# Patient Record
Sex: FEMALE | Race: WHITE | Employment: FULL TIME | URBAN - METROPOLITAN AREA
[De-identification: names, ages, dates, MRNs, and addresses within clinical notes are randomized per-mention and may not be internally consistent; named-entity substitution may affect disease eponyms.]

---

## 2018-02-24 PROBLEM — E03.9 ACQUIRED HYPOTHYROIDISM: Status: ACTIVE | Noted: 2018-02-24

## 2018-02-24 PROBLEM — R09.A2 GLOBUS SENSATION: Status: ACTIVE | Noted: 2018-02-24

## 2018-02-24 PROBLEM — G47.33 OSA (OBSTRUCTIVE SLEEP APNEA): Status: ACTIVE | Noted: 2018-02-24

## 2018-02-24 PROBLEM — E11.9 TYPE 2 DIABETES MELLITUS WITHOUT COMPLICATION, WITHOUT LONG-TERM CURRENT USE OF INSULIN (HCC): Status: ACTIVE | Noted: 2018-02-24

## 2018-02-24 PROBLEM — J45.40 MODERATE PERSISTENT ASTHMA WITHOUT COMPLICATION: Status: ACTIVE | Noted: 2018-02-24

## 2018-02-24 PROBLEM — R09.89 GLOBUS SENSATION: Status: ACTIVE | Noted: 2018-02-24

## 2018-03-06 PROBLEM — R50.9 FEVER: Status: ACTIVE | Noted: 2018-03-06

## 2018-03-08 PROBLEM — E11.65 UNCONTROLLED TYPE 2 DIABETES MELLITUS WITH HYPERGLYCEMIA (HCC): Chronic | Status: ACTIVE | Noted: 2018-02-24

## 2018-03-08 PROBLEM — E78.5 DYSLIPIDEMIA: Chronic | Status: ACTIVE | Noted: 2018-03-08

## 2018-03-08 PROBLEM — E03.9 ACQUIRED HYPOTHYROIDISM: Chronic | Status: ACTIVE | Noted: 2018-02-24

## 2018-03-08 PROBLEM — E78.5 DYSLIPIDEMIA: Status: ACTIVE | Noted: 2018-03-08

## 2018-03-08 PROBLEM — E11.65 UNCONTROLLED TYPE 2 DIABETES MELLITUS WITH HYPERGLYCEMIA (HCC): Status: ACTIVE | Noted: 2018-02-24

## 2018-03-13 ENCOUNTER — OFFICE VISIT (OUTPATIENT)
Dept: FAMILY MEDICINE CLINIC | Age: 58
End: 2018-03-13
Payer: COMMERCIAL

## 2018-03-13 VITALS
DIASTOLIC BLOOD PRESSURE: 86 MMHG | HEIGHT: 64 IN | SYSTOLIC BLOOD PRESSURE: 156 MMHG | WEIGHT: 285 LBS | BODY MASS INDEX: 48.65 KG/M2 | HEART RATE: 60 BPM | OXYGEN SATURATION: 98 %

## 2018-03-13 DIAGNOSIS — E03.9 ACQUIRED HYPOTHYROIDISM: Chronic | ICD-10-CM

## 2018-03-13 DIAGNOSIS — Z23 NEED FOR INFLUENZA VACCINATION: ICD-10-CM

## 2018-03-13 DIAGNOSIS — E11.65 UNCONTROLLED TYPE 2 DIABETES MELLITUS WITH HYPERGLYCEMIA, UNSPECIFIED LONG TERM INSULIN USE STATUS: Primary | Chronic | ICD-10-CM

## 2018-03-13 DIAGNOSIS — R09.89 GLOBUS SENSATION: ICD-10-CM

## 2018-03-13 DIAGNOSIS — E78.5 DYSLIPIDEMIA: Chronic | ICD-10-CM

## 2018-03-13 PROCEDURE — 99213 OFFICE O/P EST LOW 20 MIN: CPT | Performed by: FAMILY MEDICINE

## 2018-03-13 PROCEDURE — 90471 IMMUNIZATION ADMIN: CPT | Performed by: FAMILY MEDICINE

## 2018-03-13 PROCEDURE — 90686 IIV4 VACC NO PRSV 0.5 ML IM: CPT | Performed by: FAMILY MEDICINE

## 2018-03-13 RX ORDER — GLUCOSAMINE HCL/CHONDROITIN SU 500-400 MG
CAPSULE ORAL
Qty: 100 STRIP | Refills: 1 | Status: SHIPPED | OUTPATIENT
Start: 2018-03-13 | End: 2018-07-23

## 2018-03-13 RX ORDER — LANCETS 30 GAUGE
EACH MISCELLANEOUS
Qty: 100 EACH | Refills: 3 | Status: SHIPPED | OUTPATIENT
Start: 2018-03-13 | End: 2018-07-23

## 2018-03-13 RX ORDER — ATORVASTATIN CALCIUM 20 MG/1
20 TABLET, FILM COATED ORAL NIGHTLY
Qty: 30 TABLET | Refills: 3 | Status: SHIPPED | OUTPATIENT
Start: 2018-03-13 | End: 2018-07-23

## 2018-03-13 RX ORDER — LEVOTHYROXINE SODIUM 0.07 MG/1
75 TABLET ORAL DAILY
Qty: 30 TABLET | Refills: 3 | Status: SHIPPED | OUTPATIENT
Start: 2018-03-13 | End: 2018-07-23

## 2018-03-13 ASSESSMENT — ENCOUNTER SYMPTOMS
ABDOMINAL PAIN: 0
TROUBLE SWALLOWING: 1
VOICE CHANGE: 0
DIARRHEA: 0
WHEEZING: 1
BLOOD IN STOOL: 0
SORE THROAT: 0
SHORTNESS OF BREATH: 0
COUGH: 0

## 2018-03-15 ENCOUNTER — HOSPITAL ENCOUNTER (OUTPATIENT)
Dept: MAMMOGRAPHY | Age: 58
Discharge: HOME | End: 2018-03-17
Payer: COMMERCIAL

## 2018-03-15 DIAGNOSIS — Z12.39 BREAST CANCER SCREENING: ICD-10-CM

## 2018-03-15 PROCEDURE — 77067 SCR MAMMO BI INCL CAD: CPT

## 2018-07-23 ENCOUNTER — APPOINTMENT (OUTPATIENT)
Dept: GENERAL RADIOLOGY | Age: 58
End: 2018-07-23
Payer: COMMERCIAL

## 2018-07-23 ENCOUNTER — HOSPITAL ENCOUNTER (EMERGENCY)
Age: 58
Discharge: HOME | End: 2018-07-23
Attending: EMERGENCY MEDICINE
Payer: COMMERCIAL

## 2018-07-23 VITALS
BODY MASS INDEX: 47.46 KG/M2 | DIASTOLIC BLOOD PRESSURE: 66 MMHG | RESPIRATION RATE: 16 BRPM | HEART RATE: 60 BPM | OXYGEN SATURATION: 97 % | TEMPERATURE: 98.4 F | SYSTOLIC BLOOD PRESSURE: 142 MMHG | WEIGHT: 278 LBS | HEIGHT: 64 IN

## 2018-07-23 DIAGNOSIS — M79.602 LEFT ARM PAIN: ICD-10-CM

## 2018-07-23 DIAGNOSIS — R07.89 ATYPICAL CHEST PAIN: Primary | ICD-10-CM

## 2018-07-23 LAB
ANION GAP SERPL CALCULATED.3IONS-SCNC: 12 MMOL/L (ref 7–16)
BASOPHILS ABSOLUTE: 0.05 E9/L (ref 0–0.2)
BASOPHILS RELATIVE PERCENT: 0.7 % (ref 0–2)
BUN BLDV-MCNC: 19 MG/DL (ref 6–20)
CALCIUM SERPL-MCNC: 9 MG/DL (ref 8.6–10.2)
CHLORIDE BLD-SCNC: 100 MMOL/L (ref 98–107)
CO2: 24 MMOL/L (ref 22–29)
CREAT SERPL-MCNC: 0.5 MG/DL (ref 0.5–1)
EKG ATRIAL RATE: 61 BPM
EKG P AXIS: 37 DEGREES
EKG P-R INTERVAL: 154 MS
EKG Q-T INTERVAL: 462 MS
EKG QRS DURATION: 124 MS
EKG QTC CALCULATION (BAZETT): 465 MS
EKG R AXIS: -17 DEGREES
EKG T AXIS: -10 DEGREES
EKG VENTRICULAR RATE: 61 BPM
EOSINOPHILS ABSOLUTE: 0.27 E9/L (ref 0.05–0.5)
EOSINOPHILS RELATIVE PERCENT: 3.9 % (ref 0–6)
GFR AFRICAN AMERICAN: >60
GFR NON-AFRICAN AMERICAN: >60 ML/MIN/1.73
GLUCOSE BLD-MCNC: 305 MG/DL (ref 74–109)
HCT VFR BLD CALC: 38.5 % (ref 34–48)
HEMOGLOBIN: 13.1 G/DL (ref 11.5–15.5)
IMMATURE GRANULOCYTES #: 0.01 E9/L
IMMATURE GRANULOCYTES %: 0.1 % (ref 0–5)
LYMPHOCYTES ABSOLUTE: 2.68 E9/L (ref 1.5–4)
LYMPHOCYTES RELATIVE PERCENT: 38.3 % (ref 20–42)
MCH RBC QN AUTO: 29.4 PG (ref 26–35)
MCHC RBC AUTO-ENTMCNC: 34 % (ref 32–34.5)
MCV RBC AUTO: 86.5 FL (ref 80–99.9)
MONOCYTES ABSOLUTE: 0.48 E9/L (ref 0.1–0.95)
MONOCYTES RELATIVE PERCENT: 6.9 % (ref 2–12)
NEUTROPHILS ABSOLUTE: 3.51 E9/L (ref 1.8–7.3)
NEUTROPHILS RELATIVE PERCENT: 50.1 % (ref 43–80)
PDW BLD-RTO: 12 FL (ref 11.5–15)
PLATELET # BLD: 259 E9/L (ref 130–450)
PMV BLD AUTO: 9.6 FL (ref 7–12)
POTASSIUM SERPL-SCNC: 4.6 MMOL/L (ref 3.5–5)
RBC # BLD: 4.45 E12/L (ref 3.5–5.5)
SODIUM BLD-SCNC: 136 MMOL/L (ref 132–146)
TROPONIN: <0.01 NG/ML (ref 0–0.03)
WBC # BLD: 7 E9/L (ref 4.5–11.5)

## 2018-07-23 PROCEDURE — 6370000000 HC RX 637 (ALT 250 FOR IP): Performed by: EMERGENCY MEDICINE

## 2018-07-23 PROCEDURE — 85025 COMPLETE CBC W/AUTO DIFF WBC: CPT

## 2018-07-23 PROCEDURE — 80048 BASIC METABOLIC PNL TOTAL CA: CPT

## 2018-07-23 PROCEDURE — 93005 ELECTROCARDIOGRAM TRACING: CPT | Performed by: EMERGENCY MEDICINE

## 2018-07-23 PROCEDURE — 71045 X-RAY EXAM CHEST 1 VIEW: CPT

## 2018-07-23 PROCEDURE — 84484 ASSAY OF TROPONIN QUANT: CPT

## 2018-07-23 PROCEDURE — 99285 EMERGENCY DEPT VISIT HI MDM: CPT

## 2018-07-23 PROCEDURE — 36415 COLL VENOUS BLD VENIPUNCTURE: CPT

## 2018-07-23 RX ORDER — NAPROXEN 500 MG/1
500 TABLET ORAL 2 TIMES DAILY WITH MEALS
Qty: 30 TABLET | Refills: 0 | Status: SHIPPED | OUTPATIENT
Start: 2018-07-23 | End: 2019-03-27

## 2018-07-23 RX ORDER — ASPIRIN 81 MG/1
324 TABLET, CHEWABLE ORAL ONCE
Status: COMPLETED | OUTPATIENT
Start: 2018-07-23 | End: 2018-07-23

## 2018-07-23 RX ADMIN — ASPIRIN 81 MG 324 MG: 81 TABLET ORAL at 20:57

## 2018-07-23 ASSESSMENT — ENCOUNTER SYMPTOMS
NAUSEA: 0
SINUS PAIN: 0
WHEEZING: 0
CONSTIPATION: 0
RHINORRHEA: 0
ABDOMINAL PAIN: 0
SORE THROAT: 0
SHORTNESS OF BREATH: 1
COUGH: 0
PHOTOPHOBIA: 0
SINUS PRESSURE: 0
VOMITING: 0
DIARRHEA: 0
BACK PAIN: 0

## 2018-07-23 ASSESSMENT — PAIN SCALES - GENERAL: PAINLEVEL_OUTOF10: 6

## 2018-07-24 NOTE — ED PROVIDER NOTES
Patient is a 81-year-old female who presents the chief complaint of arm pain and chest pain. Patient states that for the past 2 days she has been experiencing left arm pain, describes as being an aching sensation. She states that it is intermittent and will happen for approximately 5 minutes and then it will go away. Nothing makes it better or worse. She also admits to substernal chest tightness, nothing makes it better or worse. She admits to shortness of breath. Denies any associated lightheadedness, dizziness, abdominal pain, nausea, vomiting. No known trauma. Patient denies any history of MI. No recent stress test. Positive family history of father, MI at age of 79. Patient is a history of diabetes. No treatment prior to arrival.      The history is provided by the patient. No  was used. Review of Systems   Constitutional: Negative for chills, fatigue and fever. HENT: Negative for congestion, rhinorrhea, sinus pain, sinus pressure, sneezing and sore throat. Eyes: Negative for photophobia and visual disturbance. Respiratory: Positive for shortness of breath. Negative for cough and wheezing. Cardiovascular: Positive for chest pain. Negative for palpitations. Gastrointestinal: Negative for abdominal pain, constipation, diarrhea, nausea and vomiting. Genitourinary: Negative for dysuria, frequency and hematuria. Musculoskeletal: Positive for myalgias. Negative for arthralgias and back pain. Skin: Negative for rash and wound. Neurological: Negative for dizziness, light-headedness and headaches. Psychiatric/Behavioral: Negative for agitation, behavioral problems and confusion. Physical Exam   Constitutional: She is oriented to person, place, and time. She appears well-developed and well-nourished. No distress. HENT:   Head: Normocephalic and atraumatic. Mouth/Throat: Oropharynx is clear and moist. No oropharyngeal exudate.    Eyes: Conjunctivae are normal. down into her left elbow. Intermittent and cramping for the last couple of days. No associated chest pain. She does comment on having chest pain which she has had for a long time which has been unchanged over the last 2 days. Also comments on some mild \"heavy breathing\" which she states again has been a chronic and ongoing issue with asthma and is not changed in the last few days. It only changing symptom in the last several days has been this intermittent left arm pain. She does a lot of lifting, pushing and pulling at work and is able to find some areas where she can feel the muscle start to pull during the exam..  My findings/plan: In the bed at this time. Denying any chest pain or shortness of breath at this time. Heart rate regular, lungs are clear and equal. Abdomen soft and nontender. All for extremities are neurovascular intact. No pretibial edema or calf pain. Left arm with no tenderness or swelling, no joint effusions or signs of acute injury. Left arm is neurovascular intact. With range of motion she is able to reproduce some of the symptoms, gets some pulling sensation to the posterior arm and shoulder blade region with range of motion. [NC]      ED Course User Index  [NC] Carolann Curran, DO       --------------------------------------------- PAST HISTORY ---------------------------------------------  Past Medical History:  has a past medical history of Asthma; Bronchitis; Diabetes mellitus ([address] Psoriasis; Thyroid disease; and Water retention. Past Surgical History:  has a past surgical history that includes Hysterectomy and Cholecystectomy. Social History:  reports that she has quit smoking. She has never used smokeless tobacco. She reports that she does not drink alcohol or use drugs. Family History: family history includes Breast Cancer in her mother; Colon Cancer (age of onset: 80) in her mother. The patients home medications have been reviewed.     Allergies: Matin

## 2018-07-26 ENCOUNTER — OFFICE VISIT (OUTPATIENT)
Dept: FAMILY MEDICINE CLINIC | Age: 58
End: 2018-07-26
Payer: COMMERCIAL

## 2018-07-26 ENCOUNTER — HOSPITAL ENCOUNTER (OUTPATIENT)
Age: 58
Discharge: HOME | End: 2018-07-28
Payer: COMMERCIAL

## 2018-07-26 VITALS
BODY MASS INDEX: 48.32 KG/M2 | WEIGHT: 283 LBS | HEART RATE: 63 BPM | DIASTOLIC BLOOD PRESSURE: 80 MMHG | OXYGEN SATURATION: 98 % | SYSTOLIC BLOOD PRESSURE: 144 MMHG | HEIGHT: 64 IN

## 2018-07-26 DIAGNOSIS — E11.65 UNCONTROLLED TYPE 2 DIABETES MELLITUS WITH HYPERGLYCEMIA, UNSPECIFIED LONG TERM INSULIN USE STATUS: Chronic | ICD-10-CM

## 2018-07-26 DIAGNOSIS — E03.9 ACQUIRED HYPOTHYROIDISM: Chronic | ICD-10-CM

## 2018-07-26 DIAGNOSIS — Z09 HOSPITAL DISCHARGE FOLLOW-UP: Primary | ICD-10-CM

## 2018-07-26 DIAGNOSIS — E78.5 DYSLIPIDEMIA: Chronic | ICD-10-CM

## 2018-07-26 DIAGNOSIS — Z91.199 NONCOMPLIANCE: ICD-10-CM

## 2018-07-26 LAB
ALBUMIN SERPL-MCNC: 4 G/DL (ref 3.5–5.2)
ALP BLD-CCNC: 114 U/L (ref 35–104)
ALT SERPL-CCNC: 23 U/L (ref 0–32)
ANION GAP SERPL CALCULATED.3IONS-SCNC: 14 MMOL/L (ref 7–16)
AST SERPL-CCNC: 28 U/L (ref 0–31)
BASOPHILS ABSOLUTE: 0.05 E9/L (ref 0–0.2)
BASOPHILS RELATIVE PERCENT: 0.7 % (ref 0–2)
BILIRUB SERPL-MCNC: 0.3 MG/DL (ref 0–1.2)
BUN BLDV-MCNC: 17 MG/DL (ref 6–20)
CALCIUM SERPL-MCNC: 9.3 MG/DL (ref 8.6–10.2)
CHLORIDE BLD-SCNC: 95 MMOL/L (ref 98–107)
CHOLESTEROL, TOTAL: 224 MG/DL (ref 0–199)
CO2: 26 MMOL/L (ref 22–29)
CREAT SERPL-MCNC: 0.5 MG/DL (ref 0.5–1)
EOSINOPHILS ABSOLUTE: 0.29 E9/L (ref 0.05–0.5)
EOSINOPHILS RELATIVE PERCENT: 4.2 % (ref 0–6)
GFR AFRICAN AMERICAN: >60
GFR NON-AFRICAN AMERICAN: >60 ML/MIN/1.73
GLUCOSE BLD-MCNC: 354 MG/DL (ref 74–109)
HBA1C MFR BLD: 12.7 % (ref 4–5.6)
HCT VFR BLD CALC: 42.6 % (ref 34–48)
HDLC SERPL-MCNC: 51 MG/DL
HEMOGLOBIN: 13.6 G/DL (ref 11.5–15.5)
IMMATURE GRANULOCYTES #: 0.01 E9/L
IMMATURE GRANULOCYTES %: 0.1 % (ref 0–5)
LDL CHOLESTEROL CALCULATED: 145 MG/DL (ref 0–99)
LYMPHOCYTES ABSOLUTE: 2.24 E9/L (ref 1.5–4)
LYMPHOCYTES RELATIVE PERCENT: 32.3 % (ref 20–42)
MCH RBC QN AUTO: 28.6 PG (ref 26–35)
MCHC RBC AUTO-ENTMCNC: 31.9 % (ref 32–34.5)
MCV RBC AUTO: 89.7 FL (ref 80–99.9)
MONOCYTES ABSOLUTE: 0.52 E9/L (ref 0.1–0.95)
MONOCYTES RELATIVE PERCENT: 7.5 % (ref 2–12)
NEUTROPHILS ABSOLUTE: 3.83 E9/L (ref 1.8–7.3)
NEUTROPHILS RELATIVE PERCENT: 55.2 % (ref 43–80)
PDW BLD-RTO: 12.1 FL (ref 11.5–15)
PLATELET # BLD: 266 E9/L (ref 130–450)
PMV BLD AUTO: 9.4 FL (ref 7–12)
POTASSIUM SERPL-SCNC: 4.6 MMOL/L (ref 3.5–5)
RBC # BLD: 4.75 E12/L (ref 3.5–5.5)
SODIUM BLD-SCNC: 135 MMOL/L (ref 132–146)
T4 FREE: 0.8 NG/DL (ref 0.93–1.7)
TOTAL PROTEIN: 7.4 G/DL (ref 6.4–8.3)
TRIGL SERPL-MCNC: 138 MG/DL (ref 0–149)
TSH SERPL DL<=0.05 MIU/L-ACNC: 45.26 UIU/ML (ref 0.27–4.2)
VLDLC SERPL CALC-MCNC: 28 MG/DL
WBC # BLD: 6.9 E9/L (ref 4.5–11.5)

## 2018-07-26 PROCEDURE — 83036 HEMOGLOBIN GLYCOSYLATED A1C: CPT

## 2018-07-26 PROCEDURE — 80053 COMPREHEN METABOLIC PANEL: CPT

## 2018-07-26 PROCEDURE — 85025 COMPLETE CBC W/AUTO DIFF WBC: CPT

## 2018-07-26 PROCEDURE — 36415 COLL VENOUS BLD VENIPUNCTURE: CPT

## 2018-07-26 PROCEDURE — 84439 ASSAY OF FREE THYROXINE: CPT

## 2018-07-26 PROCEDURE — 99213 OFFICE O/P EST LOW 20 MIN: CPT | Performed by: FAMILY MEDICINE

## 2018-07-26 PROCEDURE — 84443 ASSAY THYROID STIM HORMONE: CPT

## 2018-07-26 PROCEDURE — 80061 LIPID PANEL: CPT

## 2018-07-26 RX ORDER — IBUPROFEN 200 MG
200 TABLET ORAL EVERY 6 HOURS PRN
COMMUNITY
End: 2018-08-02

## 2018-07-26 ASSESSMENT — ENCOUNTER SYMPTOMS
ABDOMINAL PAIN: 0
CHEST TIGHTNESS: 1
SHORTNESS OF BREATH: 0

## 2018-07-26 NOTE — PROGRESS NOTES
Subjective:      Patient ID: Taylor Ramos is a 65 y.o. female. Chief Complaint   Patient presents with    Follow-Up from Hospital     pt seen at hospital for Left Upper Arm pain; pt states pain radiates to shoulder and down to elbow     HPI    Patient was last seen 3/2018 and did not follow up as scheduled. At that time she was diagnosed with DM, hypothyroidism, HLD, and prescribed relevant medications last visit 3/2018. Did not begin any of those medications. Claims she will begin them. States she will \"try to start them this week\". ED follow-up: patient was seen at [address] [place] [address] 300 ED 7/23/18 and diagnosed with atypical chest pain, left arm pain, and was prescribed naproxen on discharge. States she did not begin naproxen and that she is waiting until payday to pick it up. Reports pain has improved but does continue. Pain is located at the posterior left upper arm, goes into the trapezius and down to the elbow. Sharp, stabbing in character. Intermittent. Intensity is currently moderate, was severe at the time of the ED visit. Exacerbated by lifting freight at work, palpation. States it is not related to physical exertion other than with her upper body; is not exacerbated by walking, climbing stairs. Remits with over-the-counter Advil. Admits to occasional tightness in the chest, not related to exertion. No coinciding radiation, SOB, diaphoresis, palpitations. Admits to stress, neck pain. Review of Systems   Constitutional: Negative for diaphoresis. Respiratory: Positive for chest tightness. Negative for shortness of breath. Cardiovascular: Negative for chest pain, palpitations and leg swelling. Gastrointestinal: Negative for abdominal pain. Musculoskeletal: Positive for arthralgias, myalgias and neck pain. Neurological: Negative for weakness and numbness. Psychiatric/Behavioral: Positive for dysphoric mood. The patient is nervous/anxious.       Objective:   Physical Exam   Constitutional: She is type 2 diabetes mellitus with hyperglycemia, unspecified long term insulin use status ([facility] [address]  Currently untreated. Obtain relevant lab work to assess current status. Advised patient to fill and begin prescriptions written at her last visit. - CBC Auto Differential; Future  - Comprehensive Metabolic Panel; Future  - Hemoglobin A1C; Future    3. Dyslipidemia  Currently untreated. Obtain relevant lab work to assess current status. Advised patient to fill and begin prescriptions written at her last visit. - CBC Auto Differential; Future  - Comprehensive Metabolic Panel; Future  - Lipid Panel; Future    4. Acquired hypothyroidism  Currently untreated. Obtain relevant lab work to assess current status. Advised patient to fill and begin prescriptions written at her last visit. - CBC Auto Differential; Future  - TSH without Reflex; Future  - T4, Free; Future    5. Noncompliance  Discussed the significant risks associated with her multiple untreated diagnoses and the importance of compliance with treatment. Assures me that she will get the blood work done as ordered above and will return for follow-up    Return in about 1 week (around 8/2/2018) for lab review, follow-up.     Call or go to ED immediately if symptoms worsen or persist.    Educational materials and/or home exercises printed for patient's review and were included in patient instructions on his/her After Visit Summary and given to patient at the end of visit.       Counseled regarding above diagnosis, including possible risks and complications, especially if left uncontrolled.     Counseled regarding the possible side effects, risks, benefits and alternatives to treatment; patient and/or guardian verbalizes understanding, agrees, feels comfortable with and wishes to proceed with above treatment plan.     Advised patient to call with any new medication issues, and read all Rx info from pharmacy to assure aware of all possible risks and side effects of medication before taking.     Reviewed age and gender appropriate health screening exams and vaccinations. Advised patient regarding importance of keeping up with recommended health maintenance and to schedule as soon as possible if overdue, as this is important in assessing for undiagnosed pathology, especially cancer, as well as protecting against potentially harmful/life threatening disease.       Collette Sauers, DO  07/26/18  2:53 PM

## 2018-08-02 ENCOUNTER — OFFICE VISIT (OUTPATIENT)
Dept: FAMILY MEDICINE CLINIC | Age: 58
End: 2018-08-02
Payer: COMMERCIAL

## 2018-08-02 VITALS
OXYGEN SATURATION: 97 % | WEIGHT: 282 LBS | BODY MASS INDEX: 48.14 KG/M2 | HEART RATE: 63 BPM | HEIGHT: 64 IN | SYSTOLIC BLOOD PRESSURE: 134 MMHG | DIASTOLIC BLOOD PRESSURE: 80 MMHG

## 2018-08-02 DIAGNOSIS — E11.65 UNCONTROLLED TYPE 2 DIABETES MELLITUS WITH HYPERGLYCEMIA, UNSPECIFIED LONG TERM INSULIN USE STATUS: Primary | Chronic | ICD-10-CM

## 2018-08-02 DIAGNOSIS — E03.9 ACQUIRED HYPOTHYROIDISM: Chronic | ICD-10-CM

## 2018-08-02 DIAGNOSIS — E78.5 DYSLIPIDEMIA: Chronic | ICD-10-CM

## 2018-08-02 PROCEDURE — 99214 OFFICE O/P EST MOD 30 MIN: CPT | Performed by: FAMILY MEDICINE

## 2018-08-02 RX ORDER — ATORVASTATIN CALCIUM 20 MG/1
20 TABLET, FILM COATED ORAL NIGHTLY
COMMUNITY
End: 2018-08-02 | Stop reason: SDUPTHER

## 2018-08-02 RX ORDER — ATORVASTATIN CALCIUM 20 MG/1
20 TABLET, FILM COATED ORAL NIGHTLY
Qty: 30 TABLET | Refills: 2 | Status: SHIPPED | OUTPATIENT
Start: 2018-08-02 | End: 2019-06-12

## 2018-08-02 RX ORDER — FUROSEMIDE 40 MG/1
40 TABLET ORAL DAILY
COMMUNITY
End: 2019-07-02

## 2018-08-02 RX ORDER — FUROSEMIDE 40 MG/1
40 TABLET ORAL DAILY
Qty: 60 TABLET | Refills: 0 | Status: CANCELLED | OUTPATIENT
Start: 2018-08-02

## 2018-08-02 RX ORDER — LEVOTHYROXINE SODIUM 0.07 MG/1
75 TABLET ORAL DAILY
COMMUNITY
End: 2018-08-02 | Stop reason: SDUPTHER

## 2018-08-02 RX ORDER — LEVOTHYROXINE SODIUM 0.07 MG/1
75 TABLET ORAL DAILY
Qty: 30 TABLET | Refills: 2 | Status: SHIPPED | OUTPATIENT
Start: 2018-08-02 | End: 2018-09-21 | Stop reason: SDUPTHER

## 2018-08-02 ASSESSMENT — ENCOUNTER SYMPTOMS
WHEEZING: 0
ABDOMINAL PAIN: 0
SHORTNESS OF BREATH: 0

## 2018-08-02 NOTE — PROGRESS NOTES
Subjective:      Patient ID: Hossein Guevara is a 65 y.o. female. Chief Complaint   Patient presents with   [address]     labs done 7/26/18     HPI    Patient was seen 3/2018 and did not follow up as scheduled. At that time she was diagnosed with DM, hypothyroidism, HLD, and prescribed relevant medications but never began them. DM: Current treatment: none. Recent changes in medications: none. The patient checks home blood glucose never. Most recent HbA1c results below. Lab Results   Component Value Date    LABA1C 12.7 (H) 07/26/2018    LABA1C 11.8 (H) 02/23/2018     Dyslipidemia: Current treatment: none. Recent changes in medications: none. Indications for statin therapy include: 40-76yo, DM and LDL . Results of most recent lipid panel below. Lab Results   Component Value Date    CHOL 224 (H) 07/26/2018    HDL 51 07/26/2018    LDLCALC 145 (H) 07/26/2018    TRIG 138 07/26/2018     Hypothyroidism: Current treatment: none. Recent changes in medication: none. Symptoms have gradually worsened. Most recent relevant lab results as below. Lab Results   Component Value Date    TSH 45.260 (H) 07/26/2018    T4FREE 0.80 (L) 07/26/2018     Asthma: Current treatment albuterol PRN. Recent changes in medication: none. Needs to use albuterol once per month on average. Current Outpatient Prescriptions:     metFORMIN (GLUCOPHAGE) 1000 MG tablet, Take 1 tablet by mouth 2 times daily (with meals), Disp: 60 tablet, Rfl: 2    atorvastatin (LIPITOR) 20 MG tablet, Take 1 tablet by mouth nightly, Disp: 30 tablet, Rfl: 2    levothyroxine (SYNTHROID) 75 MCG tablet, Take 1 tablet by mouth Daily, Disp: 30 tablet, Rfl: 2    furosemide (LASIX) 40 MG tablet, Take 40 mg by mouth daily, Disp: , Rfl:     naproxen (NAPROXEN) 500 MG EC tablet, Take 1 tablet by mouth 2 times daily (with meals), Disp: 30 tablet, Rfl: 0    Review of Systems   Constitutional: Positive for fatigue. Negative for diaphoresis.    Respiratory: Reflex; Future    3. Dyslipidemia  Begin atorvastatin  - atorvastatin (LIPITOR) 20 MG tablet; Take 1 tablet by mouth nightly  Dispense: 30 tablet; Refill: 2  - Comprehensive Metabolic Panel; Future    Return in about 7 weeks (around 9/20/2018) for lab review, blood glucose log review.     Call or go to ED immediately if symptoms worsen or persist.    Educational materials and/or home exercises printed for patient's review and were included in patient instructions on his/her After Visit Summary and given to patient at the end of visit.       Counseled regarding above diagnosis, including possible risks and complications, especially if left uncontrolled.     Counseled regarding the possible side effects, risks, benefits and alternatives to treatment; patient and/or guardian verbalizes understanding, agrees, feels comfortable with and wishes to proceed with above treatment plan.     Advised patient to call with any new medication issues, and read all Rx info from pharmacy to assure aware of all possible risks and side effects of medication before taking.     Minerva Carrillo DO  08/02/18  12:26 PM

## 2018-09-09 ENCOUNTER — HOSPITAL ENCOUNTER (EMERGENCY)
Age: 58
Discharge: HOME | End: 2018-09-09
Attending: EMERGENCY MEDICINE
Payer: COMMERCIAL

## 2018-09-09 VITALS
HEART RATE: 82 BPM | RESPIRATION RATE: 18 BRPM | BODY MASS INDEX: 47.8 KG/M2 | HEIGHT: 64 IN | WEIGHT: 280 LBS | DIASTOLIC BLOOD PRESSURE: 61 MMHG | OXYGEN SATURATION: 96 % | TEMPERATURE: 97.5 F | SYSTOLIC BLOOD PRESSURE: 147 MMHG

## 2018-09-09 DIAGNOSIS — J45.41 MODERATE PERSISTENT ASTHMA WITH EXACERBATION: Primary | ICD-10-CM

## 2018-09-09 DIAGNOSIS — T59.91XA TOXIC INHALATION INJURY, ACCIDENTAL OR UNINTENTIONAL, INITIAL ENCOUNTER: ICD-10-CM

## 2018-09-09 PROCEDURE — 6370000000 HC RX 637 (ALT 250 FOR IP): Performed by: PHYSICIAN ASSISTANT

## 2018-09-09 PROCEDURE — 94664 DEMO&/EVAL PT USE INHALER: CPT

## 2018-09-09 PROCEDURE — 94640 AIRWAY INHALATION TREATMENT: CPT

## 2018-09-09 PROCEDURE — 99284 EMERGENCY DEPT VISIT MOD MDM: CPT

## 2018-09-09 PROCEDURE — 6360000002 HC RX W HCPCS: Performed by: PHYSICIAN ASSISTANT

## 2018-09-09 PROCEDURE — 96374 THER/PROPH/DIAG INJ IV PUSH: CPT

## 2018-09-09 RX ORDER — DEXAMETHASONE SODIUM PHOSPHATE 10 MG/ML
10 INJECTION, SOLUTION INTRAMUSCULAR; INTRAVENOUS ONCE
Status: COMPLETED | OUTPATIENT
Start: 2018-09-09 | End: 2018-09-09

## 2018-09-09 RX ORDER — ALBUTEROL SULFATE 90 UG/1
2 AEROSOL, METERED RESPIRATORY (INHALATION) EVERY 4 HOURS PRN
Qty: 1 INHALER | Refills: 0 | Status: SHIPPED | OUTPATIENT
Start: 2018-09-09

## 2018-09-09 RX ORDER — IPRATROPIUM BROMIDE AND ALBUTEROL SULFATE 2.5; .5 MG/3ML; MG/3ML
3 SOLUTION RESPIRATORY (INHALATION) ONCE
Status: COMPLETED | OUTPATIENT
Start: 2018-09-09 | End: 2018-09-09

## 2018-09-09 RX ORDER — PREDNISONE 20 MG/1
TABLET ORAL
Qty: 10 TABLET | Refills: 0 | Status: SHIPPED | OUTPATIENT
Start: 2018-09-09 | End: 2018-09-19

## 2018-09-09 RX ADMIN — IPRATROPIUM BROMIDE AND ALBUTEROL SULFATE 3 AMPULE: .5; 3 SOLUTION RESPIRATORY (INHALATION) at 13:49

## 2018-09-09 RX ADMIN — DEXAMETHASONE SODIUM PHOSPHATE 10 MG: 10 INJECTION, SOLUTION INTRAMUSCULAR; INTRAVENOUS at 14:31

## 2018-09-09 NOTE — ED PROVIDER NOTES
ED Attending  CC: No        HPI:  9/9/18,   Time: 1:52 PM         Christina Beltrán is a 65 y.o. female presenting to the ED for wheezing/toxic exposure, beginning 1 hour ago. The complaint has been persistent, moderate in severity, and worsened by moderate exertion. The patient has a known history of asthma. She states that she was at work about an hour ago when a fluorescent light bulb broke in front of her. She reports that her manager tried to warn her and have her turn but she states that she was within the area and believes she was exposed to some toxic fumes. She reports that immediately after she began having increased wheezing and trouble breathing. She reports that her inhaler was out of date and she did not immediately use it. She was seen at [address] [place] [address]. urgent care and states that they did have her use her inhaler. States that she failed to improve and came here to be further evaluated and treated. Again she is having increased wheezing and chest tightness. States that she was not ill prior to this incident and had been doing quite well otherwise. All symptoms began after this exposure. ROS:     Constitutional: Negative for fever and chills  HENT: Negative for ear pain, sore throat and sinus pressure  Eyes: Negative for pain, discharge and redness  Respiratory:  See HPi  Cardiovascular: Negative for CP, edema or palpitations  Gastrointestinal: Negative for nausea, vomiting, diarrhea and abdominal distention  Genitourinary: Negative for dysuria and frequency  Musculoskeletal: Negative for back pain and arthralgia  Skin: Negative for rash and wound  Neurological: Negative for weakness and headaches  Hematological: Negative for adenopathy    All other systems reviewed and are negative      --------------------------------------------- PAST HISTORY ---------------------------------------------  Past Medical History:  has a past medical history of Asthma; Bronchitis; Diabetes mellitus ([address] Psoriasis;  Thyroid dry without rash  Neurologic: GCS 15,  Intact. No focal deficits  Psych: Normal Affect      ------------------------------ ED COURSE/MEDICAL DECISION MAKING----------------------  Medications   ipratropium-albuterol (DUONEB) nebulizer solution 3 ampule (3 ampules Inhalation Given 9/9/18 1349)   dexamethasone (PF) (DECADRON) injection 10 mg (10 mg Intravenous Given 9/9/18 1431)         Medical Decision Making:    Markedly improved after breathing treatment here and some Decadron. We watched her for a bit and she remained free from symptoms. Plan discharge home with a few days of steroids and a new inhaler. She is okay to return to work tomorrow. Follow up as needed with her PCP if any ongoing symptoms    Counseling: The emergency provider has spoken with the patient and discussed todays results, in addition to providing specific details for the plan of care and counseling regarding the diagnosis and prognosis. Questions are answered at this time and they are agreeable with the plan.      --------------------------------- IMPRESSION AND DISPOSITION ---------------------------------    IMPRESSION  1. Moderate persistent asthma with exacerbation    2.  Toxic inhalation injury, accidental or unintentional, initial encounter        DISPOSITION  Disposition: Discharge to home  Patient condition is stable                   Silvia Castañeda PA-C  09/09/18 [id number]

## 2018-09-14 ENCOUNTER — HOSPITAL ENCOUNTER (EMERGENCY)
Age: 58
Discharge: HOME | End: 2018-09-14
Attending: EMERGENCY MEDICINE
Payer: COMMERCIAL

## 2018-09-14 ENCOUNTER — APPOINTMENT (OUTPATIENT)
Dept: GENERAL RADIOLOGY | Age: 58
End: 2018-09-14
Payer: COMMERCIAL

## 2018-09-14 VITALS
WEIGHT: 275 LBS | BODY MASS INDEX: 41.68 KG/M2 | HEART RATE: 71 BPM | DIASTOLIC BLOOD PRESSURE: 67 MMHG | TEMPERATURE: 98 F | RESPIRATION RATE: 18 BRPM | OXYGEN SATURATION: 94 % | SYSTOLIC BLOOD PRESSURE: 142 MMHG | HEIGHT: 68 IN

## 2018-09-14 DIAGNOSIS — R06.00 DYSPNEA, UNSPECIFIED TYPE: Primary | ICD-10-CM

## 2018-09-14 LAB
ANION GAP SERPL CALCULATED.3IONS-SCNC: 13 MMOL/L (ref 7–16)
BUN BLDV-MCNC: 16 MG/DL (ref 6–20)
CALCIUM SERPL-MCNC: 9 MG/DL (ref 8.6–10.2)
CHLORIDE BLD-SCNC: 97 MMOL/L (ref 98–107)
CO2: 24 MMOL/L (ref 22–29)
CREAT SERPL-MCNC: 0.6 MG/DL (ref 0.5–1)
EKG ATRIAL RATE: 69 BPM
EKG P AXIS: 53 DEGREES
EKG P-R INTERVAL: 146 MS
EKG Q-T INTERVAL: 430 MS
EKG QRS DURATION: 120 MS
EKG QTC CALCULATION (BAZETT): 460 MS
EKG R AXIS: -25 DEGREES
EKG T AXIS: 3 DEGREES
EKG VENTRICULAR RATE: 69 BPM
GFR AFRICAN AMERICAN: >60
GFR NON-AFRICAN AMERICAN: >60 ML/MIN/1.73
GLUCOSE BLD-MCNC: 497 MG/DL (ref 74–109)
HCT VFR BLD CALC: 42.3 % (ref 34–48)
HEMOGLOBIN: 13.9 G/DL (ref 11.5–15.5)
MCH RBC QN AUTO: 28.7 PG (ref 26–35)
MCHC RBC AUTO-ENTMCNC: 32.9 % (ref 32–34.5)
MCV RBC AUTO: 87.2 FL (ref 80–99.9)
PDW BLD-RTO: 11.9 FL (ref 11.5–15)
PLATELET # BLD: 270 E9/L (ref 130–450)
PMV BLD AUTO: 9.4 FL (ref 7–12)
POTASSIUM SERPL-SCNC: 4.1 MMOL/L (ref 3.5–5)
RBC # BLD: 4.85 E12/L (ref 3.5–5.5)
SODIUM BLD-SCNC: 134 MMOL/L (ref 132–146)
TROPONIN: <0.01 NG/ML (ref 0–0.03)
WBC # BLD: 7.5 E9/L (ref 4.5–11.5)

## 2018-09-14 PROCEDURE — 84484 ASSAY OF TROPONIN QUANT: CPT

## 2018-09-14 PROCEDURE — 71046 X-RAY EXAM CHEST 2 VIEWS: CPT

## 2018-09-14 PROCEDURE — 93005 ELECTROCARDIOGRAM TRACING: CPT | Performed by: EMERGENCY MEDICINE

## 2018-09-14 PROCEDURE — 99285 EMERGENCY DEPT VISIT HI MDM: CPT

## 2018-09-14 PROCEDURE — 80048 BASIC METABOLIC PNL TOTAL CA: CPT

## 2018-09-14 PROCEDURE — 36415 COLL VENOUS BLD VENIPUNCTURE: CPT

## 2018-09-14 PROCEDURE — 94760 N-INVAS EAR/PLS OXIMETRY 1: CPT

## 2018-09-14 PROCEDURE — 85027 COMPLETE CBC AUTOMATED: CPT

## 2018-09-14 PROCEDURE — 6370000000 HC RX 637 (ALT 250 FOR IP): Performed by: EMERGENCY MEDICINE

## 2018-09-14 RX ORDER — ALBUTEROL SULFATE 90 UG/1
2 AEROSOL, METERED RESPIRATORY (INHALATION) EVERY 6 HOURS PRN
Status: DISCONTINUED | OUTPATIENT
Start: 2018-09-14 | End: 2018-09-14 | Stop reason: HOSPADM

## 2018-09-14 RX ADMIN — ALBUTEROL SULFATE 2 PUFF: 90 AEROSOL, METERED RESPIRATORY (INHALATION) at 12:21

## 2018-09-14 ASSESSMENT — ENCOUNTER SYMPTOMS
WHEEZING: 0
SPUTUM PRODUCTION: 0
NAUSEA: 0
COUGH: 1
BLURRED VISION: 0
DIARRHEA: 0
HEMOPTYSIS: 0
VOMITING: 0
SHORTNESS OF BREATH: 1
BACK PAIN: 0
ABDOMINAL PAIN: 0

## 2018-09-14 NOTE — ED PROVIDER NOTES
HPI:  18,   Time: 10:38 AM         Christina Lloyd is a 65 y.o. female presenting to the ED for evaluation of shortness of breath, beginning one hour ago. The complaint has been intermittent, moderate in severity, and worsened by nothing. She states that she is currently not short of breath. No chest pain associated with this. No associated nausea or vomiting. She states that nothing makes the pain better or worse. She was seen here recently for similar complaint after being exposed to a broken fluorescent light bulb. She does report a history of asthma. She states she has an inhaler but did not use it because it is . She was unable to get a new one filled. As it is too expensive. He has a cough that is nonproductive. No fever or chills reported. ROS: Review of Systems   Constitutional: Positive for malaise/fatigue. Negative for chills, diaphoresis and fever. HENT: Negative for congestion. Eyes: Negative for blurred vision. Respiratory: Positive for cough and shortness of breath. Negative for hemoptysis, sputum production and wheezing. Cardiovascular: Negative for chest pain, palpitations and leg swelling. Gastrointestinal: Negative for abdominal pain, diarrhea, nausea and vomiting. Musculoskeletal: Negative for back pain. Skin: Negative for rash. Neurological: Negative for dizziness, loss of consciousness and headaches. Endo/Heme/Allergies: Does not bruise/bleed easily. All other systems reviewed and are negative. Pertinent positives and negatives are stated within HPI, all other systems reviewed and are negative.  --------------------------------------------- PAST HISTORY ---------------------------------------------  Past Medical History:  has a past medical history of Asthma; Bronchitis; Diabetes mellitus ([address] Psoriasis; Thyroid disease; and Water retention. Past Surgical History:  has a past surgical history that includes Hysterectomy and Cholecystectomy.     Social care and counseling regarding the diagnosis and prognosis. Questions are answered at this time and they are agreeable with the plan.      --------------------------------- IMPRESSION AND DISPOSITION ---------------------------------    IMPRESSION  1.  Dyspnea, unspecified type        DISPOSITION  Disposition: Discharge to home  Patient condition is good                Zev Flores DO  09/15/18 [id number]

## 2018-09-18 ENCOUNTER — CARE COORDINATION (OUTPATIENT)
Dept: CARE COORDINATION | Age: 58
End: 2018-09-18

## 2018-09-20 ENCOUNTER — OFFICE VISIT (OUTPATIENT)
Dept: FAMILY MEDICINE CLINIC | Age: 58
End: 2018-09-20
Payer: COMMERCIAL

## 2018-09-20 ENCOUNTER — HOSPITAL ENCOUNTER (OUTPATIENT)
Age: 58
Discharge: HOME | End: 2018-09-22
Payer: COMMERCIAL

## 2018-09-20 VITALS
BODY MASS INDEX: 42.13 KG/M2 | WEIGHT: 278 LBS | HEART RATE: 62 BPM | HEIGHT: 68 IN | OXYGEN SATURATION: 97 % | SYSTOLIC BLOOD PRESSURE: 128 MMHG | DIASTOLIC BLOOD PRESSURE: 72 MMHG

## 2018-09-20 DIAGNOSIS — E78.5 DYSLIPIDEMIA: Chronic | ICD-10-CM

## 2018-09-20 DIAGNOSIS — E03.9 ACQUIRED HYPOTHYROIDISM: Chronic | ICD-10-CM

## 2018-09-20 DIAGNOSIS — Z23 NEED FOR INFLUENZA VACCINATION: ICD-10-CM

## 2018-09-20 DIAGNOSIS — J45.40 MODERATE PERSISTENT ASTHMA WITHOUT COMPLICATION: ICD-10-CM

## 2018-09-20 DIAGNOSIS — E11.65: Primary | Chronic | ICD-10-CM

## 2018-09-20 DIAGNOSIS — E11.65 UNCONTROLLED TYPE 2 DIABETES MELLITUS WITH HYPERGLYCEMIA (HCC): Chronic | ICD-10-CM

## 2018-09-20 LAB
ALBUMIN SERPL-MCNC: 4 G/DL (ref 3.5–5.2)
ALP BLD-CCNC: 108 U/L (ref 35–104)
ALT SERPL-CCNC: 22 U/L (ref 0–32)
ANION GAP SERPL CALCULATED.3IONS-SCNC: 15 MMOL/L (ref 7–16)
AST SERPL-CCNC: 19 U/L (ref 0–31)
BILIRUB SERPL-MCNC: 0.4 MG/DL (ref 0–1.2)
BUN BLDV-MCNC: 11 MG/DL (ref 6–20)
CALCIUM SERPL-MCNC: 8.8 MG/DL (ref 8.6–10.2)
CHLORIDE BLD-SCNC: 94 MMOL/L (ref 98–107)
CO2: 26 MMOL/L (ref 22–29)
CREAT SERPL-MCNC: 0.6 MG/DL (ref 0.5–1)
GFR AFRICAN AMERICAN: >60
GFR NON-AFRICAN AMERICAN: >60 ML/MIN/1.73
GLUCOSE BLD-MCNC: 376 MG/DL (ref 74–109)
POTASSIUM SERPL-SCNC: 4.7 MMOL/L (ref 3.5–5)
SODIUM BLD-SCNC: 135 MMOL/L (ref 132–146)
T4 FREE: 0.9 NG/DL (ref 0.93–1.7)
TOTAL PROTEIN: 6.8 G/DL (ref 6.4–8.3)
TSH SERPL DL<=0.05 MIU/L-ACNC: 33.69 UIU/ML (ref 0.27–4.2)

## 2018-09-20 PROCEDURE — 90686 IIV4 VACC NO PRSV 0.5 ML IM: CPT | Performed by: FAMILY MEDICINE

## 2018-09-20 PROCEDURE — 84439 ASSAY OF FREE THYROXINE: CPT

## 2018-09-20 PROCEDURE — 80053 COMPREHEN METABOLIC PANEL: CPT

## 2018-09-20 PROCEDURE — 99214 OFFICE O/P EST MOD 30 MIN: CPT | Performed by: FAMILY MEDICINE

## 2018-09-20 PROCEDURE — 84443 ASSAY THYROID STIM HORMONE: CPT

## 2018-09-20 PROCEDURE — 90471 IMMUNIZATION ADMIN: CPT | Performed by: FAMILY MEDICINE

## 2018-09-20 RX ORDER — DIPHENHYDRAMINE HYDROCHLORIDE 25 MG/1
CAPSULE, LIQUID FILLED ORAL
Qty: 1 KIT | Refills: 0 | Status: SHIPPED | OUTPATIENT
Start: 2018-09-20

## 2018-09-20 RX ORDER — GLUCOSAMINE HCL/CHONDROITIN SU 500-400 MG
CAPSULE ORAL
Qty: 100 STRIP | Refills: 1 | Status: SHIPPED
Start: 2018-09-20 | End: 2022-02-09 | Stop reason: SDUPTHER

## 2018-09-20 RX ORDER — LANCETS 30 GAUGE
EACH MISCELLANEOUS
Qty: 100 EACH | Refills: 1 | Status: SHIPPED | OUTPATIENT
Start: 2018-09-20

## 2018-09-20 ASSESSMENT — ENCOUNTER SYMPTOMS
WHEEZING: 0
ABDOMINAL PAIN: 0
SHORTNESS OF BREATH: 0

## 2018-09-20 NOTE — PROGRESS NOTES
Subjective:      Patient ID: Rikki Maxwell is a 65 y.o. female. Chief Complaint   Patient presents with   [facility] [address]     lab work not completed     Other     pt recently seen in ER for asthma attack and possible anxiety attack    Health Maintenance     flu shot given     HPI    Patient has history of poor compliance. Treatment had been started some time ago however she was lost to follow-up and had run out of all medications prior to last visit. In the interim patient was seen twice in the ED for shortness of breath. She has history of asthma and was prescribed albuterol as needed but was unable to afford it therefore did not have it home. Shortness of breath improved with breathing treatments at the ED, she was given an albuterol inhaler at discharge and reports that she has needed to use it twice since the last ED visit. Blood glucose was checked and was 497. Patient voices some concern about anxiety possibly contributing, states she was never formally diagnosed with or treated for anxiety or depression. Reports anxiety has been somewhat worse in the past 2 weeks primarily related to stress at work. DM: Current treatment: Metformin 1000 mg twice daily. Recent changes in medications: Current treatment was started 8/2/18. The patient checks home blood glucose never-claims she lost her glucometer about one year ago when she moved. Most recent HbA1c results below. Lab Results   Component Value Date    LABA1C 12.7 (H) 07/26/2018    LABA1C 11.8 (H) 02/23/2018     Dyslipidemia: Current treatment: Atorvastatin 20 mg daily. Recent changes in medications: Current treatment was started 8/2/18. Indications for statin therapy include: 40-74yo, DM and LDL . Results of most recent lipid panel below. Lab Results   Component Value Date    CHOL 224 (H) 07/26/2018    HDL 51 07/26/2018    LDLCALC 145 (H) 07/26/2018    TRIG 138 07/26/2018     Hypothyroidism: Current treatment: Levothyroxine 75 µg daily.  Recent changes in medication: Current treatment was started 8/2/18. Symptoms have gradually improved. Most recent relevant lab results as below. Lab Results   Component Value Date    TSH 45.260 (H) 07/26/2018    T4FREE 0.80 (L) 07/26/2018     Asthma: Current treatment albuterol PRN. Recent changes in medication: She had run out of albuterol and recently was given a new inhaler at discharge from the ED. Since last visit in the ED she has needed to use the albuterol inhaler twice and was effective. Current Outpatient Prescriptions:     blood glucose monitor strips, Check twice daily, Disp: 100 strip, Rfl: 1    Blood Glucose Monitoring Suppl (BLOOD GLUCOSE MONITOR SYSTEM) w/Device KIT, Check blood glucose twice daily, Disp: 1 kit, Rfl: 0    Lancets MISC, Check blood glucose twice daily, Disp: 100 each, Rfl: 1    insulin glargine (LANTUS SOLOSTAR) 100 UNIT/ML injection pen, Inject 10 Units into the skin nightly, Disp: 5 pen, Rfl: 3    albuterol sulfate HFA (PROVENTIL HFA) 108 (90 Base) MCG/ACT inhaler, Inhale 2 puffs into the lungs every 4 hours as needed for Wheezing or Shortness of Breath, Disp: 1 Inhaler, Rfl: 0    furosemide (LASIX) 40 MG tablet, Take 40 mg by mouth daily, Disp: , Rfl:     metFORMIN (GLUCOPHAGE) 1000 MG tablet, Take 1 tablet by mouth 2 times daily (with meals), Disp: 60 tablet, Rfl: 2    atorvastatin (LIPITOR) 20 MG tablet, Take 1 tablet by mouth nightly, Disp: 30 tablet, Rfl: 2    levothyroxine (SYNTHROID) 75 MCG tablet, Take 1 tablet by mouth Daily, Disp: 30 tablet, Rfl: 2    naproxen (NAPROXEN) 500 MG EC tablet, Take 1 tablet by mouth 2 times daily (with meals), Disp: 30 tablet, Rfl: 0    Review of Systems   Constitutional: Positive for fatigue. Negative for diaphoresis. Respiratory: Negative for shortness of breath and wheezing. Cardiovascular: Negative for chest pain, palpitations and leg swelling. Gastrointestinal: Negative for abdominal pain.    Musculoskeletal: Positive for arthralgias, myalgias and neck pain. Neurological: Negative for weakness and numbness. Psychiatric/Behavioral: Positive for dysphoric mood. The patient is nervous/anxious. Objective:   Physical Exam   Constitutional: She is oriented to person, place, and time. No distress. HENT:   Head: Normocephalic and atraumatic. Right Ear: External ear normal.   Left Ear: External ear normal.   Eyes: Conjunctivae are normal.   Neck: Neck supple. Cardiovascular: Normal rate, regular rhythm, normal heart sounds and intact distal pulses. Pulmonary/Chest: Effort normal and breath sounds normal. No respiratory distress. She has no decreased breath sounds. She has no wheezes. She has no rhonchi. She has no rales. Abdominal: Soft. Bowel sounds are normal. There is no tenderness. Musculoskeletal: She exhibits edema (bilateral lower extremities with 1+ edema, chronic venous stasis skin changes). Neurological: She is alert and oriented to person, place, and time. No cranial nerve deficit. Skin: Skin is warm and dry. She is not diaphoretic. Psychiatric: Her mood appears anxious. /72 (Site: Right Upper Arm, Position: Sitting, Cuff Size: Large Adult)   Pulse 62   Ht 5' 7.99\" (1.727 m)   Wt 278 lb (126.1 kg)   SpO2 97%   BMI 42.28 kg/m²     Assessment / Plan:      Patient did not get bloodwork done for today's visit, it will be done prior to leaving today. - Comprehensive Metabolic Panel; Future  - T4, Free; Future  - TSH without Reflex; Future    1. Uncontrolled type 2 diabetes mellitus with hyperglycemia, unspecified whether long term insulin use ([facility])  Continue metformin 1000mg bid. Begin lantus 10 units nightly. Begin checking BG twice daily and bring log for review next visit - also encouraged to email readings weekly through [employer]. Refer to diabetic education.   - blood glucose monitor strips; Check twice daily  Dispense: 100 strip;  Refill: 1  - Blood Glucose Monitoring Suppl (BLOOD GLUCOSE

## 2018-09-21 ENCOUNTER — TELEPHONE (OUTPATIENT)
Dept: FAMILY MEDICINE CLINIC | Age: 58
End: 2018-09-21

## 2018-09-21 DIAGNOSIS — E03.9 ACQUIRED HYPOTHYROIDISM: Chronic | ICD-10-CM

## 2018-09-21 RX ORDER — LEVOTHYROXINE SODIUM 0.1 MG/1
100 TABLET ORAL DAILY
Qty: 30 TABLET | Refills: 2 | Status: SHIPPED | OUTPATIENT
Start: 2018-09-21 | End: 2019-02-21 | Stop reason: SDUPTHER

## 2018-09-25 ENCOUNTER — CARE COORDINATION (OUTPATIENT)
Dept: CARE COORDINATION | Age: 58
End: 2018-09-25

## 2018-09-25 NOTE — CARE COORDINATION
[facility] called patient to follow up on introduction letter . Left message to return [facility] call.  Contact information given

## 2018-10-11 ENCOUNTER — CARE COORDINATION (OUTPATIENT)
Dept: CARE COORDINATION | Age: 58
End: 2018-10-11

## 2018-11-06 ENCOUNTER — CARE COORDINATION (OUTPATIENT)
Dept: CARE COORDINATION | Age: 58
End: 2018-11-06

## 2018-11-06 ENCOUNTER — OFFICE VISIT (OUTPATIENT)
Dept: FAMILY MEDICINE CLINIC | Age: 58
End: 2018-11-06
Payer: COMMERCIAL

## 2018-11-06 VITALS
BODY MASS INDEX: 46.32 KG/M2 | DIASTOLIC BLOOD PRESSURE: 72 MMHG | HEIGHT: 65 IN | HEART RATE: 70 BPM | OXYGEN SATURATION: 98 % | SYSTOLIC BLOOD PRESSURE: 124 MMHG | WEIGHT: 278 LBS

## 2018-11-06 DIAGNOSIS — E11.65 UNCONTROLLED TYPE 2 DIABETES MELLITUS WITH HYPERGLYCEMIA (HCC): Primary | Chronic | ICD-10-CM

## 2018-11-06 DIAGNOSIS — E78.5 DYSLIPIDEMIA: Chronic | ICD-10-CM

## 2018-11-06 DIAGNOSIS — E03.9 ACQUIRED HYPOTHYROIDISM: Chronic | ICD-10-CM

## 2018-11-06 DIAGNOSIS — Z91.199 NONCOMPLIANCE: ICD-10-CM

## 2018-11-06 LAB — HBA1C MFR BLD: 13.3 %

## 2018-11-06 PROCEDURE — 83036 HEMOGLOBIN GLYCOSYLATED A1C: CPT | Performed by: FAMILY MEDICINE

## 2018-11-06 PROCEDURE — 99214 OFFICE O/P EST MOD 30 MIN: CPT | Performed by: FAMILY MEDICINE

## 2018-11-06 ASSESSMENT — ENCOUNTER SYMPTOMS
WHEEZING: 0
ABDOMINAL PAIN: 0
SHORTNESS OF BREATH: 0

## 2018-12-13 ENCOUNTER — HOSPITAL ENCOUNTER (OUTPATIENT)
Age: 58
Discharge: HOME | End: 2018-12-15
Payer: COMMERCIAL

## 2018-12-13 DIAGNOSIS — E78.5 DYSLIPIDEMIA: Chronic | ICD-10-CM

## 2018-12-13 DIAGNOSIS — E11.65 UNCONTROLLED TYPE 2 DIABETES MELLITUS WITH HYPERGLYCEMIA (HCC): Chronic | ICD-10-CM

## 2018-12-13 DIAGNOSIS — E03.9 ACQUIRED HYPOTHYROIDISM: Chronic | ICD-10-CM

## 2018-12-13 LAB
ALBUMIN SERPL-MCNC: 4.3 G/DL (ref 3.5–5.2)
ALP BLD-CCNC: 112 U/L (ref 35–104)
ALT SERPL-CCNC: 27 U/L (ref 0–32)
ANION GAP SERPL CALCULATED.3IONS-SCNC: 18 MMOL/L (ref 7–16)
AST SERPL-CCNC: 22 U/L (ref 0–31)
BASOPHILS ABSOLUTE: 0.06 E9/L (ref 0–0.2)
BASOPHILS RELATIVE PERCENT: 0.9 % (ref 0–2)
BILIRUB SERPL-MCNC: 0.4 MG/DL (ref 0–1.2)
BUN BLDV-MCNC: 15 MG/DL (ref 6–20)
CALCIUM SERPL-MCNC: 9.2 MG/DL (ref 8.6–10.2)
CHLORIDE BLD-SCNC: 98 MMOL/L (ref 98–107)
CHOLESTEROL, TOTAL: 234 MG/DL (ref 0–199)
CO2: 23 MMOL/L (ref 22–29)
CREAT SERPL-MCNC: 0.6 MG/DL (ref 0.5–1)
EOSINOPHILS ABSOLUTE: 0.32 E9/L (ref 0.05–0.5)
EOSINOPHILS RELATIVE PERCENT: 4.6 % (ref 0–6)
GFR AFRICAN AMERICAN: >60
GFR NON-AFRICAN AMERICAN: >60 ML/MIN/1.73
GLUCOSE BLD-MCNC: 373 MG/DL (ref 74–99)
HCT VFR BLD CALC: 43.3 % (ref 34–48)
HDLC SERPL-MCNC: 57 MG/DL
HEMOGLOBIN: 13.5 G/DL (ref 11.5–15.5)
IMMATURE GRANULOCYTES #: 0.03 E9/L
IMMATURE GRANULOCYTES %: 0.4 % (ref 0–5)
LDL CHOLESTEROL CALCULATED: 152 MG/DL (ref 0–99)
LYMPHOCYTES ABSOLUTE: 2.46 E9/L (ref 1.5–4)
LYMPHOCYTES RELATIVE PERCENT: 35.3 % (ref 20–42)
MCH RBC QN AUTO: 28.5 PG (ref 26–35)
MCHC RBC AUTO-ENTMCNC: 31.2 % (ref 32–34.5)
MCV RBC AUTO: 91.4 FL (ref 80–99.9)
MONOCYTES ABSOLUTE: 0.41 E9/L (ref 0.1–0.95)
MONOCYTES RELATIVE PERCENT: 5.9 % (ref 2–12)
NEUTROPHILS ABSOLUTE: 3.68 E9/L (ref 1.8–7.3)
NEUTROPHILS RELATIVE PERCENT: 52.9 % (ref 43–80)
PDW BLD-RTO: 12.8 FL (ref 11.5–15)
PLATELET # BLD: 311 E9/L (ref 130–450)
PMV BLD AUTO: 9.3 FL (ref 7–12)
POTASSIUM SERPL-SCNC: 4.5 MMOL/L (ref 3.5–5)
RBC # BLD: 4.74 E12/L (ref 3.5–5.5)
SODIUM BLD-SCNC: 139 MMOL/L (ref 132–146)
T4 FREE: 0.81 NG/DL (ref 0.93–1.7)
TOTAL PROTEIN: 6.9 G/DL (ref 6.4–8.3)
TRIGL SERPL-MCNC: 127 MG/DL (ref 0–149)
TSH SERPL DL<=0.05 MIU/L-ACNC: 61.07 UIU/ML (ref 0.27–4.2)
VLDLC SERPL CALC-MCNC: 25 MG/DL
WBC # BLD: 7 E9/L (ref 4.5–11.5)

## 2018-12-13 PROCEDURE — 85025 COMPLETE CBC W/AUTO DIFF WBC: CPT

## 2018-12-13 PROCEDURE — 36415 COLL VENOUS BLD VENIPUNCTURE: CPT

## 2018-12-13 PROCEDURE — 80053 COMPREHEN METABOLIC PANEL: CPT

## 2018-12-13 PROCEDURE — 80061 LIPID PANEL: CPT

## 2018-12-13 PROCEDURE — 84443 ASSAY THYROID STIM HORMONE: CPT

## 2018-12-13 PROCEDURE — 84439 ASSAY OF FREE THYROXINE: CPT

## 2018-12-17 ENCOUNTER — CARE COORDINATION (OUTPATIENT)
Dept: CARE COORDINATION | Age: 58
End: 2018-12-17

## 2018-12-20 ENCOUNTER — HOSPITAL ENCOUNTER (EMERGENCY)
Age: 58
Discharge: HOME | End: 2018-12-20
Payer: COMMERCIAL

## 2018-12-20 VITALS
OXYGEN SATURATION: 96 % | RESPIRATION RATE: 18 BRPM | DIASTOLIC BLOOD PRESSURE: 64 MMHG | TEMPERATURE: 98.7 F | SYSTOLIC BLOOD PRESSURE: 139 MMHG | WEIGHT: 278 LBS | BODY MASS INDEX: 46.98 KG/M2 | HEART RATE: 70 BPM

## 2018-12-20 DIAGNOSIS — H10.31 ACUTE BACTERIAL CONJUNCTIVITIS OF RIGHT EYE: ICD-10-CM

## 2018-12-20 DIAGNOSIS — J06.9 UPPER RESPIRATORY TRACT INFECTION, UNSPECIFIED TYPE: Primary | ICD-10-CM

## 2018-12-20 PROCEDURE — 99212 OFFICE O/P EST SF 10 MIN: CPT

## 2018-12-20 RX ORDER — CIPROFLOXACIN HYDROCHLORIDE 3.5 MG/ML
2 SOLUTION/ DROPS TOPICAL 3 TIMES DAILY
Qty: 42 DROP | Refills: 0 | Status: SHIPPED | OUTPATIENT
Start: 2018-12-20 | End: 2018-12-27

## 2018-12-20 RX ORDER — AZITHROMYCIN 250 MG/1
TABLET, FILM COATED ORAL
Qty: 6 TABLET | Refills: 0 | Status: SHIPPED | OUTPATIENT
Start: 2018-12-20 | End: 2018-12-30

## 2019-01-04 ENCOUNTER — TELEPHONE (OUTPATIENT)
Dept: FAMILY MEDICINE CLINIC | Age: 59
End: 2019-01-04

## 2019-01-04 ENCOUNTER — CARE COORDINATION (OUTPATIENT)
Dept: CARE COORDINATION | Age: 59
End: 2019-01-04

## 2019-01-04 ENCOUNTER — OFFICE VISIT (OUTPATIENT)
Dept: FAMILY MEDICINE CLINIC | Age: 59
End: 2019-01-04
Payer: COMMERCIAL

## 2019-01-04 VITALS
SYSTOLIC BLOOD PRESSURE: 128 MMHG | HEART RATE: 62 BPM | WEIGHT: 288 LBS | HEIGHT: 65 IN | BODY MASS INDEX: 47.98 KG/M2 | OXYGEN SATURATION: 97 % | DIASTOLIC BLOOD PRESSURE: 70 MMHG

## 2019-01-04 DIAGNOSIS — E03.9 ACQUIRED HYPOTHYROIDISM: Primary | Chronic | ICD-10-CM

## 2019-01-04 DIAGNOSIS — Z91.199 NONCOMPLIANCE: ICD-10-CM

## 2019-01-04 DIAGNOSIS — E11.65 UNCONTROLLED TYPE 2 DIABETES MELLITUS WITH HYPERGLYCEMIA (HCC): Primary | Chronic | ICD-10-CM

## 2019-01-04 DIAGNOSIS — E11.65 UNCONTROLLED TYPE 2 DIABETES MELLITUS WITH HYPERGLYCEMIA (HCC): Chronic | ICD-10-CM

## 2019-01-04 DIAGNOSIS — E78.5 DYSLIPIDEMIA: Chronic | ICD-10-CM

## 2019-01-04 PROCEDURE — 99214 OFFICE O/P EST MOD 30 MIN: CPT | Performed by: FAMILY MEDICINE

## 2019-01-04 RX ORDER — ALOGLIPTIN 25 MG/1
25 TABLET, FILM COATED ORAL DAILY
Qty: 30 TABLET | Refills: 3 | Status: SHIPPED | OUTPATIENT
Start: 2019-01-04 | End: 2019-01-04

## 2019-01-04 ASSESSMENT — PATIENT HEALTH QUESTIONNAIRE - PHQ9
2. FEELING DOWN, DEPRESSED OR HOPELESS: 0
SUM OF ALL RESPONSES TO PHQ QUESTIONS 1-9: 0
SUM OF ALL RESPONSES TO PHQ9 QUESTIONS 1 & 2: 0
SUM OF ALL RESPONSES TO PHQ QUESTIONS 1-9: 0
1. LITTLE INTEREST OR PLEASURE IN DOING THINGS: 0

## 2019-01-04 ASSESSMENT — ENCOUNTER SYMPTOMS
WHEEZING: 0
ABDOMINAL PAIN: 0
SHORTNESS OF BREATH: 0

## 2019-01-15 ENCOUNTER — CARE COORDINATION (OUTPATIENT)
Dept: CARE COORDINATION | Age: 59
End: 2019-01-15

## 2019-02-05 ENCOUNTER — CARE COORDINATION (OUTPATIENT)
Dept: CARE COORDINATION | Age: 59
End: 2019-02-05

## 2019-02-18 ENCOUNTER — HOSPITAL ENCOUNTER (OUTPATIENT)
Age: 59
Discharge: HOME | End: 2019-02-20
Payer: COMMERCIAL

## 2019-02-18 DIAGNOSIS — E78.5 DYSLIPIDEMIA: Chronic | ICD-10-CM

## 2019-02-18 DIAGNOSIS — E03.9 ACQUIRED HYPOTHYROIDISM: Chronic | ICD-10-CM

## 2019-02-18 DIAGNOSIS — E11.65 UNCONTROLLED TYPE 2 DIABETES MELLITUS WITH HYPERGLYCEMIA (HCC): Chronic | ICD-10-CM

## 2019-02-18 LAB
ALBUMIN SERPL-MCNC: 4.1 G/DL (ref 3.5–5.2)
ALP BLD-CCNC: 121 U/L (ref 35–104)
ALT SERPL-CCNC: 22 U/L (ref 0–32)
ANION GAP SERPL CALCULATED.3IONS-SCNC: 14 MMOL/L (ref 7–16)
AST SERPL-CCNC: 23 U/L (ref 0–31)
BILIRUB SERPL-MCNC: 0.3 MG/DL (ref 0–1.2)
BUN BLDV-MCNC: 12 MG/DL (ref 6–20)
CALCIUM SERPL-MCNC: 9 MG/DL (ref 8.6–10.2)
CHLORIDE BLD-SCNC: 99 MMOL/L (ref 98–107)
CO2: 26 MMOL/L (ref 22–29)
CREAT SERPL-MCNC: 0.5 MG/DL (ref 0.5–1)
GFR AFRICAN AMERICAN: >60
GFR NON-AFRICAN AMERICAN: >60 ML/MIN/1.73
GLUCOSE BLD-MCNC: 269 MG/DL (ref 74–99)
HBA1C MFR BLD: 11.8 % (ref 4–5.6)
HCT VFR BLD CALC: 44.6 % (ref 34–48)
HEMOGLOBIN: 14.4 G/DL (ref 11.5–15.5)
MCH RBC QN AUTO: 29.2 PG (ref 26–35)
MCHC RBC AUTO-ENTMCNC: 32.3 % (ref 32–34.5)
MCV RBC AUTO: 90.5 FL (ref 80–99.9)
PDW BLD-RTO: 12.5 FL (ref 11.5–15)
PLATELET # BLD: 295 E9/L (ref 130–450)
PMV BLD AUTO: 9.3 FL (ref 7–12)
POTASSIUM SERPL-SCNC: 4.4 MMOL/L (ref 3.5–5)
RBC # BLD: 4.93 E12/L (ref 3.5–5.5)
SODIUM BLD-SCNC: 139 MMOL/L (ref 132–146)
T4 FREE: 1.22 NG/DL (ref 0.93–1.7)
TOTAL PROTEIN: 7.2 G/DL (ref 6.4–8.3)
TSH SERPL DL<=0.05 MIU/L-ACNC: 29.75 UIU/ML (ref 0.27–4.2)
WBC # BLD: 5.8 E9/L (ref 4.5–11.5)

## 2019-02-18 PROCEDURE — 84439 ASSAY OF FREE THYROXINE: CPT

## 2019-02-18 PROCEDURE — 84443 ASSAY THYROID STIM HORMONE: CPT

## 2019-02-18 PROCEDURE — 85027 COMPLETE CBC AUTOMATED: CPT

## 2019-02-18 PROCEDURE — 80053 COMPREHEN METABOLIC PANEL: CPT

## 2019-02-18 PROCEDURE — 36415 COLL VENOUS BLD VENIPUNCTURE: CPT

## 2019-02-18 PROCEDURE — 83036 HEMOGLOBIN GLYCOSYLATED A1C: CPT

## 2019-02-21 ENCOUNTER — OFFICE VISIT (OUTPATIENT)
Dept: FAMILY MEDICINE CLINIC | Age: 59
End: 2019-02-21
Payer: COMMERCIAL

## 2019-02-21 VITALS
HEART RATE: 65 BPM | WEIGHT: 287 LBS | BODY MASS INDEX: 47.82 KG/M2 | OXYGEN SATURATION: 97 % | HEIGHT: 65 IN | SYSTOLIC BLOOD PRESSURE: 130 MMHG | DIASTOLIC BLOOD PRESSURE: 82 MMHG

## 2019-02-21 DIAGNOSIS — Z91.199 NONCOMPLIANCE: ICD-10-CM

## 2019-02-21 DIAGNOSIS — E78.5 DYSLIPIDEMIA: Chronic | ICD-10-CM

## 2019-02-21 DIAGNOSIS — Z12.39 BREAST CANCER SCREENING: ICD-10-CM

## 2019-02-21 DIAGNOSIS — E11.65 UNCONTROLLED TYPE 2 DIABETES MELLITUS WITH HYPERGLYCEMIA (HCC): Primary | Chronic | ICD-10-CM

## 2019-02-21 DIAGNOSIS — E03.9 ACQUIRED HYPOTHYROIDISM: Chronic | ICD-10-CM

## 2019-02-21 LAB
CREATININE URINE POCT: 200
MICROALBUMIN/CREAT 24H UR: 30 MG/G{CREAT}
MICROALBUMIN/CREAT UR-RTO: <30

## 2019-02-21 PROCEDURE — 82044 UR ALBUMIN SEMIQUANTITATIVE: CPT | Performed by: FAMILY MEDICINE

## 2019-02-21 PROCEDURE — 99214 OFFICE O/P EST MOD 30 MIN: CPT | Performed by: FAMILY MEDICINE

## 2019-02-21 RX ORDER — LEVOTHYROXINE SODIUM 0.12 MG/1
125 TABLET ORAL DAILY
Qty: 30 TABLET | Refills: 2 | Status: SHIPPED | OUTPATIENT
Start: 2019-02-21 | End: 2019-06-12

## 2019-02-21 RX ORDER — GLIMEPIRIDE 2 MG/1
2 TABLET ORAL
Qty: 30 TABLET | Refills: 2 | Status: SHIPPED | OUTPATIENT
Start: 2019-02-21 | End: 2019-04-30 | Stop reason: SDUPTHER

## 2019-02-21 ASSESSMENT — ENCOUNTER SYMPTOMS
ABDOMINAL PAIN: 0
WHEEZING: 0
SHORTNESS OF BREATH: 0

## 2019-03-19 ENCOUNTER — TELEPHONE (OUTPATIENT)
Dept: FAMILY MEDICINE CLINIC | Age: 59
End: 2019-03-19

## 2019-03-27 ENCOUNTER — OFFICE VISIT (OUTPATIENT)
Dept: FAMILY MEDICINE CLINIC | Age: 59
End: 2019-03-27
Payer: COMMERCIAL

## 2019-03-27 VITALS
SYSTOLIC BLOOD PRESSURE: 132 MMHG | DIASTOLIC BLOOD PRESSURE: 82 MMHG | HEIGHT: 64 IN | OXYGEN SATURATION: 96 % | TEMPERATURE: 98.2 F | HEART RATE: 74 BPM | BODY MASS INDEX: 46.95 KG/M2 | WEIGHT: 275 LBS

## 2019-03-27 DIAGNOSIS — R05.9 COUGH: ICD-10-CM

## 2019-03-27 DIAGNOSIS — Z12.11 COLON CANCER SCREENING: ICD-10-CM

## 2019-03-27 DIAGNOSIS — E11.65 UNCONTROLLED TYPE 2 DIABETES MELLITUS WITH HYPERGLYCEMIA (HCC): Primary | ICD-10-CM

## 2019-03-27 DIAGNOSIS — Z12.39 BREAST CANCER SCREENING: ICD-10-CM

## 2019-03-27 DIAGNOSIS — E03.9 ACQUIRED HYPOTHYROIDISM: ICD-10-CM

## 2019-03-27 DIAGNOSIS — J01.90 ACUTE RHINOSINUSITIS: ICD-10-CM

## 2019-03-27 LAB
INFLUENZA A ANTIGEN, POC: NORMAL
INFLUENZA B ANTIGEN, POC: NORMAL

## 2019-03-27 PROCEDURE — 99214 OFFICE O/P EST MOD 30 MIN: CPT | Performed by: FAMILY MEDICINE

## 2019-03-27 PROCEDURE — 87804 INFLUENZA ASSAY W/OPTIC: CPT | Performed by: FAMILY MEDICINE

## 2019-03-27 RX ORDER — ECHINACEA PURPUREA EXTRACT 125 MG
1 TABLET ORAL PRN
Qty: 1 BOTTLE | Refills: 1 | Status: SHIPPED
Start: 2019-03-27 | End: 2021-01-05 | Stop reason: ALTCHOICE

## 2019-03-27 RX ORDER — AZITHROMYCIN 250 MG/1
250 TABLET, FILM COATED ORAL SEE ADMIN INSTRUCTIONS
Qty: 6 TABLET | Refills: 0 | Status: SHIPPED | OUTPATIENT
Start: 2019-03-27 | End: 2019-04-01

## 2019-03-27 RX ORDER — FLUTICASONE PROPIONATE 50 MCG
1 SPRAY, SUSPENSION (ML) NASAL DAILY
Qty: 1 BOTTLE | Refills: 1 | Status: SHIPPED
Start: 2019-03-27 | End: 2021-01-05 | Stop reason: ALTCHOICE

## 2019-03-27 ASSESSMENT — ENCOUNTER SYMPTOMS
COUGH: 1
ABDOMINAL PAIN: 0
SHORTNESS OF BREATH: 0
SORE THROAT: 0
RHINORRHEA: 1
WHEEZING: 0

## 2019-04-09 ENCOUNTER — CARE COORDINATION (OUTPATIENT)
Dept: CARE COORDINATION | Age: 59
End: 2019-04-09

## 2019-04-30 ENCOUNTER — HOSPITAL ENCOUNTER (OUTPATIENT)
Dept: MAMMOGRAPHY | Age: 59
Discharge: HOME | End: 2019-05-02
Payer: COMMERCIAL

## 2019-04-30 ENCOUNTER — OFFICE VISIT (OUTPATIENT)
Dept: FAMILY MEDICINE CLINIC | Age: 59
End: 2019-04-30
Payer: COMMERCIAL

## 2019-04-30 VITALS
SYSTOLIC BLOOD PRESSURE: 128 MMHG | HEART RATE: 62 BPM | DIASTOLIC BLOOD PRESSURE: 82 MMHG | OXYGEN SATURATION: 97 % | HEIGHT: 64 IN | BODY MASS INDEX: 47.97 KG/M2 | WEIGHT: 281 LBS

## 2019-04-30 DIAGNOSIS — E11.65 UNCONTROLLED TYPE 2 DIABETES MELLITUS WITH HYPERGLYCEMIA (HCC): Primary | Chronic | ICD-10-CM

## 2019-04-30 DIAGNOSIS — F41.1 GAD (GENERALIZED ANXIETY DISORDER): ICD-10-CM

## 2019-04-30 DIAGNOSIS — Z12.11 COLON CANCER SCREENING: ICD-10-CM

## 2019-04-30 DIAGNOSIS — Z91.199 NONCOMPLIANCE: ICD-10-CM

## 2019-04-30 DIAGNOSIS — Z12.39 BREAST CANCER SCREENING: ICD-10-CM

## 2019-04-30 DIAGNOSIS — J45.40 MODERATE PERSISTENT ASTHMA WITHOUT COMPLICATION: ICD-10-CM

## 2019-04-30 DIAGNOSIS — E78.5 DYSLIPIDEMIA: ICD-10-CM

## 2019-04-30 DIAGNOSIS — E03.9 ACQUIRED HYPOTHYROIDISM: ICD-10-CM

## 2019-04-30 PROCEDURE — 99214 OFFICE O/P EST MOD 30 MIN: CPT | Performed by: FAMILY MEDICINE

## 2019-04-30 PROCEDURE — 77063 BREAST TOMOSYNTHESIS BI: CPT

## 2019-04-30 RX ORDER — GLIMEPIRIDE 2 MG/1
2 TABLET ORAL
Qty: 30 TABLET | Refills: 2 | Status: SHIPPED | OUTPATIENT
Start: 2019-04-30 | End: 2019-07-02

## 2019-04-30 RX ORDER — VENLAFAXINE HYDROCHLORIDE 75 MG/1
75 CAPSULE, EXTENDED RELEASE ORAL DAILY
Qty: 30 CAPSULE | Refills: 2 | Status: SHIPPED | OUTPATIENT
Start: 2019-04-30 | End: 2019-09-10

## 2019-04-30 RX ORDER — VENLAFAXINE HYDROCHLORIDE 37.5 MG/1
37.5 CAPSULE, EXTENDED RELEASE ORAL DAILY
Qty: 7 CAPSULE | Refills: 0 | Status: SHIPPED | OUTPATIENT
Start: 2019-04-30 | End: 2019-07-30 | Stop reason: ALTCHOICE

## 2019-04-30 ASSESSMENT — ENCOUNTER SYMPTOMS
SHORTNESS OF BREATH: 0
SORE THROAT: 0
ABDOMINAL PAIN: 0
COUGH: 0
WHEEZING: 0

## 2019-04-30 NOTE — PROGRESS NOTES
Christina Moon   Patient is a 63y.o. year old female who presents with:  Chief Complaint   Patient presents with    Diabetes     checkup on Diabetes    Health Maintenance     Pt Forgot to do Fit     Patient also follows with: none    HPI    Patient has longstanding history of poor compliance. At last visit had not begun taking glimepiride last visit, again today has not begun taking it and reports she forgot to pick it up from the pharmacy. Admits that anx/dep are a contributing factor to poor compliance. Anxiety  Patient is here for evaluation of anxiety. She has the following anxiety symptoms: difficulty concentrating, fatigue, insomnia, psychomotor agitation, racing thoughts. Onset of symptoms was approximately several years ago. Symptoms have been gradually worsening since that time. She denies current suicidal and homicidal ideation. Possible organic causes contributing are: endocrine/metabolic. Previous treatment includes individual therapy and medication single medication which was moderately effective, does not recall the name. She complains of the following medication side effects: none. DM  Current treatment: metformin 1000mg bid and Januvia 100mg daily  Recent changes in medications: none  The patient checks home blood glucose 1-2 times per day  Recent blood glucose readings: poorly controlled; 200-300s  Lantus was prescribed in the past but not started d/t price  Glimeperide was prescribed but did not pick it up  Referred to endocrinology, has first appt 7/2019  Lab Results   Component Value Date    LABA1C 11.8 (H) 02/18/2019    LABA1C 13.3 11/06/2018    LABA1C 12.7 (H) 07/26/2018     Dyslipidemia  Current treatment: atorvastatin 20mg daily   Recent changes in medications: improved compliance   Indications for statin therapy include: 40-76yo, DM and LDL . The patient denies problems associated with the medication.    Lab Results   Component Value Date    CHOL 234 (H) 12/13/2018    HDL 57 12/13/2018    LDLCALC 152 (H) 12/13/2018    TRIG 127 12/13/2018     Hypothyroidism  Current treatment: levothyroxine 125ug daily   Recent changes in medication: dose was increased from 100ug daily to 125ug daily 2/2019  Reports symptoms have gradually improved. Lab Results   Component Value Date    TSH 29.750 (H) 02/18/2019    T4FREE 1.22 02/18/2019     Review of Systems   Constitutional: Negative for chills, diaphoresis, fever and unexpected weight change. HENT: Negative for congestion and sore throat. Eyes: Negative for visual disturbance. Respiratory: Negative for cough, shortness of breath and wheezing. Cardiovascular: Negative for chest pain, palpitations and leg swelling. Gastrointestinal: Negative for abdominal pain. Musculoskeletal: Positive for arthralgias. Neurological: Negative for weakness and numbness. Psychiatric/Behavioral: Positive for dysphoric mood. Negative for confusion. The patient is nervous/anxious.       Health Maintenance Due   Topic Date Due    Diabetic foot exam  12/24/1971    Hepatitis B Vaccine (1 of 3 - Risk 3-dose series) 12/24/1980    DTaP/Tdap/Td vaccine (1 - Tdap) 12/24/1980    Cervical cancer screen  12/24/1982    Shingles Vaccine (1 of 2) 12/24/2011    Colon cancer screen colonoscopy  12/24/2011    Breast cancer screen  03/15/2019     Mammo - ordered 2/21/19, has not been scheduled - provided with schedule for mammovan    Colon cancer screen - had colonoscopy 15 years ago, has FIT kit at home    Medications:   Current Outpatient Medications   Medication Sig Dispense Refill    glimepiride (AMARYL) 2 MG tablet Take 1 tablet by mouth every morning (before breakfast) 30 tablet 2    venlafaxine (EFFEXOR XR) 37.5 MG extended release capsule Take 1 capsule by mouth daily for 7 days 7 capsule 0    venlafaxine (EFFEXOR XR) 75 MG extended release capsule Take 1 capsule by mouth daily Begin after completing 37.5mg daily x 7 days 30 capsule 2  fluticasone (FLONASE) 50 MCG/ACT nasal spray 1 spray by Nasal route daily One spray each nostril daily 1 Bottle 1    sodium chloride (OCEAN) 0.65 % nasal spray 1 spray by Nasal route as needed for Congestion 1 Bottle 1    levothyroxine (SYNTHROID) 125 MCG tablet Take 1 tablet by mouth Daily 30 tablet 2    SITagliptin (JANUVIA) 100 MG tablet Take 1 tablet by mouth daily 30 tablet 2    blood glucose monitor strips Check twice daily 100 strip 1    Blood Glucose Monitoring Suppl (BLOOD GLUCOSE MONITOR SYSTEM) w/Device KIT Check blood glucose twice daily 1 kit 0    Lancets MISC Check blood glucose twice daily 100 each 1    albuterol sulfate HFA (PROVENTIL HFA) 108 (90 Base) MCG/ACT inhaler Inhale 2 puffs into the lungs every 4 hours as needed for Wheezing or Shortness of Breath 1 Inhaler 0    metFORMIN (GLUCOPHAGE) 1000 MG tablet Take 1 tablet by mouth 2 times daily (with meals) 60 tablet 2    atorvastatin (LIPITOR) 20 MG tablet Take 1 tablet by mouth nightly 30 tablet 2    furosemide (LASIX) 40 MG tablet Take 40 mg by mouth daily       No current facility-administered medications for this visit.         History    Medical      Diagnosis Date    Asthma     Bronchitis     Diabetes mellitus ([address]     Psoriasis     Thyroid disease     Water retention        Surgical  Past Surgical History:   Procedure Laterality Date    CHOLECYSTECTOMY      HYSTERECTOMY         Allergies  Allergies   Allergen Reactions    Pcn [Penicillins]        Family      Problem Relation Age of Onset   [place] Breast Cancer Mother     Colon Cancer Mother 80       Social History     Socioeconomic History    Marital status: Single     Spouse name: None    Number of children: None    Years of education: None    Highest education level: None   Occupational History     Employer: [employer]     Comment:  in [address] [place] [address] resource strain: None    Food insecurity:     Worry: None     Inability: None  Transportation needs:     Medical: None     Non-medical: None   Tobacco Use    Smoking status: Former Smoker     Packs/day: 0.00     Years: 0.00     Pack years: 0.00     Last attempt to quit: 2018     Years since quittin.7    Smokeless tobacco: Never Used    Tobacco comment: quit    Substance and Sexual Activity    Alcohol use: No    Drug use: No    Sexual activity: None   Lifestyle    Physical activity:     Days per week: None     Minutes per session: None    Stress: None   Relationships    Social connections:     Talks on phone: None     Gets together: None     Attends Hindu service: None     Active member of club or organization: None     Attends meetings of clubs or organizations: None     Relationship status: None    Intimate partner violence:     Fear of current or ex partner: None     Emotionally abused: None     Physically abused: None     Forced sexual activity: None   Other Topics Concern    None   Social History Narrative    None       OBJECTIVE    /82 (Site: Right Upper Arm, Position: Sitting, Cuff Size: Medium Adult)   Pulse 62   Ht 5' 4\" (1.626 m)   Wt 281 lb (127.5 kg)   SpO2 97%   BMI 48.23 kg/m²     Wt Readings from Last 3 Encounters:   19 281 lb (127.5 kg)   19 275 lb (124.7 kg)   19 287 lb (130.2 kg)     Physical Exam   Constitutional: She is oriented to person, place, and time. No distress. HENT:   Head: Normocephalic and atraumatic. Right Ear: External ear normal.   Left Ear: External ear and ear canal normal.   Nose: Left sinus exhibits no frontal sinus tenderness. Eyes: Conjunctivae are normal.   Neck: Neck supple. Carotid bruit is not present. No thyromegaly present. Cardiovascular: Normal rate, regular rhythm, normal heart sounds and intact distal pulses. Pulmonary/Chest: Effort normal and breath sounds normal.   Abdominal: Soft. There is no tenderness. There is no guarding.    Musculoskeletal: She exhibits no edema (trace, chronic venous stasis skin changes). Lymphadenopathy:     She has no cervical adenopathy. Neurological: She is alert and oriented to person, place, and time. Skin: Skin is warm and dry. She is not diaphoretic. Psychiatric: Her speech is normal and behavior is normal. Thought content normal. Her mood appears anxious. She exhibits a depressed mood. ASSESSMENT AND PLAN    1. Uncontrolled type 2 diabetes mellitus with hyperglycemia (HCC)  Begin glimepiride. Obtain relevant lab work for review next visit. - glimepiride (AMARYL) 2 MG tablet; Take 1 tablet by mouth every morning (before breakfast)  Dispense: 30 tablet; Refill: 2  - Comprehensive Metabolic Panel; Future  - Hemoglobin A1C; Future    2. Noncompliance  Again had a long discussion regarding the importance of controlling the patients multiple chronic medical problems and the risks associated with each. Admits that anx/dep are contributing. Begin tx as per #3    3. MARIELY (generalized anxiety disorder)  Begin effexor, reassess in six weeks. - venlafaxine (EFFEXOR XR) 37.5 MG extended release capsule; Take 1 capsule by mouth daily for 7 days  Dispense: 7 capsule; Refill: 0  - venlafaxine (EFFEXOR XR) 75 MG extended release capsule; Take 1 capsule by mouth daily Begin after completing 37.5mg daily x 7 days  Dispense: 30 capsule; Refill: 2    4. Dyslipidemia  Continue current treatment and obtain relevant lab work for review next visit. - Comprehensive Metabolic Panel; Future  - Lipid Panel; Future    5. Moderate persistent asthma without complication  Controlled, continue current treatment. - Comprehensive Metabolic Panel; Future  - CBC Auto Differential; Future    6. Acquired hypothyroidism  Continue current treatment and obtain relevant lab work for review next visit. - TSH without Reflex; Future  - T4, Free; Future    7. Colon cancer screening  FIT at home, reminded to get done ASAP    8.  Breast cancer screening  Ordered, provided with schedule for mammovan    Return in about 6 weeks (around 6/11/2019) for lab review, or sooner as needed. , Labs are to be done one week prior to next visit. Ale Gonzalez,   04/30/19  9:02 AM    There are no Patient Instructions on file for this visit.

## 2019-05-09 ENCOUNTER — CARE COORDINATION (OUTPATIENT)
Dept: CARE COORDINATION | Age: 59
End: 2019-05-09

## 2019-06-06 ENCOUNTER — HOSPITAL ENCOUNTER (OUTPATIENT)
Age: 59
Discharge: HOME | End: 2019-06-08
Payer: COMMERCIAL

## 2019-06-06 DIAGNOSIS — E11.65 UNCONTROLLED TYPE 2 DIABETES MELLITUS WITH HYPERGLYCEMIA (HCC): Chronic | ICD-10-CM

## 2019-06-06 DIAGNOSIS — E78.5 DYSLIPIDEMIA: ICD-10-CM

## 2019-06-06 DIAGNOSIS — E03.9 ACQUIRED HYPOTHYROIDISM: ICD-10-CM

## 2019-06-06 DIAGNOSIS — J45.40 MODERATE PERSISTENT ASTHMA WITHOUT COMPLICATION: ICD-10-CM

## 2019-06-06 LAB
ALBUMIN SERPL-MCNC: 4.1 G/DL (ref 3.5–5.2)
ALP BLD-CCNC: 110 U/L (ref 35–104)
ALT SERPL-CCNC: 23 U/L (ref 0–32)
ANION GAP SERPL CALCULATED.3IONS-SCNC: 20 MMOL/L (ref 7–16)
AST SERPL-CCNC: 19 U/L (ref 0–31)
BASOPHILS ABSOLUTE: 0.04 E9/L (ref 0–0.2)
BASOPHILS RELATIVE PERCENT: 0.7 % (ref 0–2)
BILIRUB SERPL-MCNC: 0.2 MG/DL (ref 0–1.2)
BUN BLDV-MCNC: 18 MG/DL (ref 6–20)
CALCIUM SERPL-MCNC: 9.6 MG/DL (ref 8.6–10.2)
CHLORIDE BLD-SCNC: 98 MMOL/L (ref 98–107)
CHOLESTEROL, TOTAL: 223 MG/DL (ref 0–199)
CO2: 20 MMOL/L (ref 22–29)
CREAT SERPL-MCNC: 0.6 MG/DL (ref 0.5–1)
EOSINOPHILS ABSOLUTE: 0.23 E9/L (ref 0.05–0.5)
EOSINOPHILS RELATIVE PERCENT: 3.8 % (ref 0–6)
GFR AFRICAN AMERICAN: >60
GFR NON-AFRICAN AMERICAN: >60 ML/MIN/1.73
GLUCOSE BLD-MCNC: 268 MG/DL (ref 74–99)
HBA1C MFR BLD: 10.6 % (ref 4–5.6)
HCT VFR BLD CALC: 40.5 % (ref 34–48)
HDLC SERPL-MCNC: 47 MG/DL
HEMOGLOBIN: 12.9 G/DL (ref 11.5–15.5)
IMMATURE GRANULOCYTES #: 0.02 E9/L
IMMATURE GRANULOCYTES %: 0.3 % (ref 0–5)
LDL CHOLESTEROL CALCULATED: 134 MG/DL (ref 0–99)
LYMPHOCYTES ABSOLUTE: 1.81 E9/L (ref 1.5–4)
LYMPHOCYTES RELATIVE PERCENT: 29.6 % (ref 20–42)
MCH RBC QN AUTO: 29.1 PG (ref 26–35)
MCHC RBC AUTO-ENTMCNC: 31.9 % (ref 32–34.5)
MCV RBC AUTO: 91.2 FL (ref 80–99.9)
MONOCYTES ABSOLUTE: 0.5 E9/L (ref 0.1–0.95)
MONOCYTES RELATIVE PERCENT: 8.2 % (ref 2–12)
NEUTROPHILS ABSOLUTE: 3.52 E9/L (ref 1.8–7.3)
NEUTROPHILS RELATIVE PERCENT: 57.4 % (ref 43–80)
PDW BLD-RTO: 12.6 FL (ref 11.5–15)
PLATELET # BLD: 281 E9/L (ref 130–450)
PMV BLD AUTO: 9.3 FL (ref 7–12)
POTASSIUM SERPL-SCNC: 4.6 MMOL/L (ref 3.5–5)
RBC # BLD: 4.44 E12/L (ref 3.5–5.5)
SODIUM BLD-SCNC: 138 MMOL/L (ref 132–146)
T4 FREE: 0.79 NG/DL (ref 0.93–1.7)
TOTAL PROTEIN: 7 G/DL (ref 6.4–8.3)
TRIGL SERPL-MCNC: 211 MG/DL (ref 0–149)
TSH SERPL DL<=0.05 MIU/L-ACNC: 38.74 UIU/ML (ref 0.27–4.2)
VLDLC SERPL CALC-MCNC: 42 MG/DL
WBC # BLD: 6.1 E9/L (ref 4.5–11.5)

## 2019-06-06 PROCEDURE — 83036 HEMOGLOBIN GLYCOSYLATED A1C: CPT

## 2019-06-06 PROCEDURE — 80061 LIPID PANEL: CPT

## 2019-06-06 PROCEDURE — 84443 ASSAY THYROID STIM HORMONE: CPT

## 2019-06-06 PROCEDURE — 36415 COLL VENOUS BLD VENIPUNCTURE: CPT

## 2019-06-06 PROCEDURE — 80053 COMPREHEN METABOLIC PANEL: CPT

## 2019-06-06 PROCEDURE — 84439 ASSAY OF FREE THYROXINE: CPT

## 2019-06-06 PROCEDURE — 85025 COMPLETE CBC W/AUTO DIFF WBC: CPT

## 2019-06-07 ENCOUNTER — TELEPHONE (OUTPATIENT)
Dept: FAMILY MEDICINE CLINIC | Age: 59
End: 2019-06-07

## 2019-06-10 ENCOUNTER — CARE COORDINATION (OUTPATIENT)
Dept: CARE COORDINATION | Age: 59
End: 2019-06-10

## 2019-06-12 ENCOUNTER — OFFICE VISIT (OUTPATIENT)
Dept: FAMILY MEDICINE CLINIC | Age: 59
End: 2019-06-12
Payer: COMMERCIAL

## 2019-06-12 VITALS
SYSTOLIC BLOOD PRESSURE: 136 MMHG | BODY MASS INDEX: 47.29 KG/M2 | WEIGHT: 277 LBS | DIASTOLIC BLOOD PRESSURE: 72 MMHG | HEART RATE: 66 BPM | OXYGEN SATURATION: 96 % | HEIGHT: 64 IN

## 2019-06-12 DIAGNOSIS — E78.5 DYSLIPIDEMIA: ICD-10-CM

## 2019-06-12 DIAGNOSIS — F41.1 GAD (GENERALIZED ANXIETY DISORDER): ICD-10-CM

## 2019-06-12 DIAGNOSIS — E03.9 ACQUIRED HYPOTHYROIDISM: ICD-10-CM

## 2019-06-12 DIAGNOSIS — E11.65 UNCONTROLLED TYPE 2 DIABETES MELLITUS WITH HYPERGLYCEMIA (HCC): Primary | ICD-10-CM

## 2019-06-12 PROCEDURE — 90715 TDAP VACCINE 7 YRS/> IM: CPT | Performed by: FAMILY MEDICINE

## 2019-06-12 PROCEDURE — 90471 IMMUNIZATION ADMIN: CPT | Performed by: FAMILY MEDICINE

## 2019-06-12 PROCEDURE — 99214 OFFICE O/P EST MOD 30 MIN: CPT | Performed by: FAMILY MEDICINE

## 2019-06-12 RX ORDER — LEVOTHYROXINE SODIUM 0.15 MG/1
150 TABLET ORAL DAILY
Qty: 90 TABLET | Refills: 1 | Status: SHIPPED | OUTPATIENT
Start: 2019-06-12 | End: 2019-07-02

## 2019-06-12 RX ORDER — ATORVASTATIN CALCIUM 40 MG/1
40 TABLET, FILM COATED ORAL DAILY
Qty: 90 TABLET | Refills: 1 | Status: SHIPPED | OUTPATIENT
Start: 2019-06-12 | End: 2019-09-10

## 2019-06-12 ASSESSMENT — ENCOUNTER SYMPTOMS
ABDOMINAL PAIN: 0
WHEEZING: 0
SORE THROAT: 0
SHORTNESS OF BREATH: 0
COUGH: 0

## 2019-06-12 NOTE — PROGRESS NOTES
Christina Moon   Patient is a 63y.o. year old female who presents with:  Chief Complaint   Patient presents with   [address]     labs done on 06/06/19   [facility]     pt stated she forgot about Fit test, wants tdap, Pt would like to know about script for Shingles vaccine     Patient also follows with: none    HPI    Patient has longstanding history of poor compliance. Today reports she still has not begun taking glimepiride and has not begun effexor. Prescribed effexor but did not pick it up d/t cost, plans to pick it up in the near future. Has not begun taking glimepiride d/t cost.     Has not gotten lasix out of concern about taking a water pill and needing to urinate frequently at work. Anxiety  Patient is here for evaluation of anxiety. She has the following anxiety symptoms: difficulty concentrating, fatigue, insomnia, psychomotor agitation, racing thoughts. Onset of symptoms was approximately several years ago. Symptoms have been gradually worsening since that time. She denies current suicidal and homicidal ideation. Possible organic causes contributing are: endocrine/metabolic. Previous treatment includes individual therapy and medication single medication which was moderately effective, does not recall the name. She complains of the following medication side effects: none.     DM  Current treatment: metformin 1000mg bid and Januvia 100mg daily  Recent changes in medications: none  The patient checks home blood glucose 1-2 times per day  Recent blood glucose readings: poorly controlled; 200-300s  Lantus was prescribed in the past but not started d/t price  Glimeperide was prescribed but has not been started  Referred to endocrinology, has first appt 7/2019  Lab Results   Component Value Date    LABA1C 10.6 (H) 06/06/2019    LABA1C 11.8 (H) 02/18/2019    LABA1C 13.3 11/06/2018     Dyslipidemia  Current treatment: atorvastatin 20mg daily   Recent changes in medications: none Indications for statin therapy include: 40-76yo, DM and LDL . The patient denies problems associated with the medication. Lab Results   Component Value Date    CHOL 223 (H) 06/06/2019    HDL 47 06/06/2019    LDLCALC 134 (H) 06/06/2019    TRIG 211 (H) 06/06/2019     Hypothyroidism  Current treatment: levothyroxine 125ug daily   Recent changes in medication: dose was increased from 100ug daily to 125ug daily 2/2019  Reports symptoms have gradually improved. Lab Results   Component Value Date    TSH 38.740 (H) 06/06/2019    T4FREE 0.79 (L) 06/06/2019     Review of Systems   Constitutional: Negative for chills, diaphoresis, fever and unexpected weight change. HENT: Negative for congestion and sore throat. Eyes: Negative for visual disturbance. Respiratory: Negative for cough, shortness of breath and wheezing. Cardiovascular: Negative for chest pain, palpitations and leg swelling. Gastrointestinal: Negative for abdominal pain. Musculoskeletal: Positive for arthralgias. Neurological: Negative for weakness and numbness. Psychiatric/Behavioral: Positive for dysphoric mood. Negative for confusion. The patient is nervous/anxious.       Health Maintenance Due   Topic Date Due    Diabetic foot exam  12/24/1971    Hepatitis B Vaccine (1 of 3 - Risk 3-dose series) 12/24/1980    Cervical cancer screen  12/24/1982    Shingles Vaccine (1 of 2) 12/24/2011    Colon cancer screen colonoscopy  12/24/2011     Colon cancer screen - had colonoscopy 15 years ago, has FIT kit at home    Medications:   Current Outpatient Medications   Medication Sig Dispense Refill    atorvastatin (LIPITOR) 40 MG tablet Take 1 tablet by mouth daily 90 tablet 1    levothyroxine (SYNTHROID) 150 MCG tablet Take 1 tablet by mouth daily 90 tablet 1    fluticasone (FLONASE) 50 MCG/ACT nasal spray 1 spray by Nasal route daily One spray each nostril daily 1 Bottle 1    sodium chloride (OCEAN) 0.65 % nasal spray 1 spray by Nasal route as needed for Congestion 1 Bottle 1    SITagliptin (JANUVIA) 100 MG tablet Take 1 tablet by mouth daily 30 tablet 2    blood glucose monitor strips Check twice daily 100 strip 1    Blood Glucose Monitoring Suppl (BLOOD GLUCOSE MONITOR SYSTEM) w/Device KIT Check blood glucose twice daily 1 kit 0    Lancets MISC Check blood glucose twice daily 100 each 1    albuterol sulfate HFA (PROVENTIL HFA) 108 (90 Base) MCG/ACT inhaler Inhale 2 puffs into the lungs every 4 hours as needed for Wheezing or Shortness of Breath 1 Inhaler 0    metFORMIN (GLUCOPHAGE) 1000 MG tablet Take 1 tablet by mouth 2 times daily (with meals) 60 tablet 2    glimepiride (AMARYL) 2 MG tablet Take 1 tablet by mouth every morning (before breakfast) 30 tablet 2    venlafaxine (EFFEXOR XR) 37.5 MG extended release capsule Take 1 capsule by mouth daily for 7 days 7 capsule 0    venlafaxine (EFFEXOR XR) 75 MG extended release capsule Take 1 capsule by mouth daily Begin after completing 37.5mg daily x 7 days 30 capsule 2    furosemide (LASIX) 40 MG tablet Take 40 mg by mouth daily       No current facility-administered medications for this visit.         History    Medical      Diagnosis Date    Asthma     Bronchitis     Diabetes mellitus ([address]     Psoriasis     Thyroid disease     Water retention        Surgical  Past Surgical History:   Procedure Laterality Date    CHOLECYSTECTOMY      HYSTERECTOMY         Allergies  Allergies   Allergen Reactions    Pcn [Penicillins]        Family      Problem Relation Age of Onset   [facility] Breast Cancer Mother     Colon Cancer Mother 80       Social History     Socioeconomic History    Marital status: Single     Spouse name: None    Number of children: None    Years of education: None    Highest education level: None   Occupational History     Employer: [employer]     Comment:  in [address] [place] [address] resource strain: None    Food insecurity:     Worry: None Inability: None    Transportation needs:     Medical: None     Non-medical: None   Tobacco Use    Smoking status: Former Smoker     Packs/day: 0.00     Years: 0.00     Pack years: 0.00     Last attempt to quit: 2018     Years since quittin.8    Smokeless tobacco: Never Used    Tobacco comment: quit    Substance and Sexual Activity    Alcohol use: No    Drug use: No    Sexual activity: None   Lifestyle    Physical activity:     Days per week: None     Minutes per session: None    Stress: None   Relationships    Social connections:     Talks on phone: None     Gets together: None     Attends [employer] service: None     Active member of club or organization: None     Attends meetings of clubs or organizations: None     Relationship status: None    Intimate partner violence:     Fear of current or ex partner: None     Emotionally abused: None     Physically abused: None     Forced sexual activity: None   Other Topics Concern    None   Social History Narrative    None       OBJECTIVE    /72 (Site: Right Upper Arm, Position: Sitting, Cuff Size: Large Adult)   Pulse 66   Ht 5' 4\" (1.626 m)   Wt 277 lb (125.6 kg)   SpO2 96%   BMI 47.55 kg/m²     Wt Readings from Last 3 Encounters:   19 277 lb (125.6 kg)   19 281 lb (127.5 kg)   19 275 lb (124.7 kg)     Physical Exam   Constitutional: She is oriented to person, place, and time. No distress. HENT:   Head: Normocephalic and atraumatic. Right Ear: External ear normal.   Left Ear: External ear and ear canal normal.   Nose: Left sinus exhibits no frontal sinus tenderness. Eyes: Conjunctivae are normal.   Neck: Neck supple. Carotid bruit is not present. No thyromegaly present. Cardiovascular: Normal rate, regular rhythm, normal heart sounds and intact distal pulses. Pulmonary/Chest: Effort normal and breath sounds normal.   Abdominal: Soft. There is no tenderness. There is no guarding.    Musculoskeletal: She exhibits no edema (chronic venous stasis skin changes). Lymphadenopathy:     She has no cervical adenopathy. Neurological: She is alert and oriented to person, place, and time. Skin: Skin is warm and dry. She is not diaphoretic. Psychiatric: Her speech is normal and behavior is normal. Thought content normal. Her mood appears anxious. She exhibits a depressed mood. ASSESSMENT AND PLAN    1. Uncontrolled type 2 diabetes mellitus with hyperglycemia (HCC)  Poorly controlled. Patient is to begin glimepiride as prescribed. 2. Acquired hypothyroidism  Poorly controlled. Increase levothyroxine from 125ug daily to 150ug daily  - levothyroxine (SYNTHROID) 150 MCG tablet; Take 1 tablet by mouth daily  Dispense: 90 tablet; Refill: 1  - TSH without Reflex; Future  - T4, Free; Future    3. Dyslipidemia  Poorly controlled. Increase atorvastatin from 20mg daily to 40mg daily  - atorvastatin (LIPITOR) 40 MG tablet; Take 1 tablet by mouth daily  Dispense: 90 tablet; Refill: 1    4. MARIELY (generalized anxiety disorder)  Encouraged patient to begin effexor as prescribed. Claims that anxiety/dep are contributing to poor compliance. Hopefully with treatment compliance also improves. Return in about 6 weeks (around 7/24/2019) for lab review, or sooner as needed. , Labs are to be done one week prior to next visit. Dayanara Caro,   06/12/19  9:21 AM    There are no Patient Instructions on file for this visit.

## 2019-07-02 ENCOUNTER — OFFICE VISIT (OUTPATIENT)
Dept: ENDOCRINOLOGY | Age: 59
End: 2019-07-02
Payer: COMMERCIAL

## 2019-07-02 VITALS
DIASTOLIC BLOOD PRESSURE: 82 MMHG | WEIGHT: 278.8 LBS | OXYGEN SATURATION: 98 % | RESPIRATION RATE: 16 BRPM | BODY MASS INDEX: 47.6 KG/M2 | HEART RATE: 85 BPM | SYSTOLIC BLOOD PRESSURE: 156 MMHG | HEIGHT: 64 IN

## 2019-07-02 DIAGNOSIS — E03.9 HYPOTHYROIDISM, UNSPECIFIED TYPE: ICD-10-CM

## 2019-07-02 DIAGNOSIS — E11.8 TYPE 2 DIABETES MELLITUS WITH COMPLICATION, UNSPECIFIED WHETHER LONG TERM INSULIN USE: Primary | ICD-10-CM

## 2019-07-02 DIAGNOSIS — E11.65 UNCONTROLLED TYPE 2 DIABETES MELLITUS WITH HYPERGLYCEMIA (HCC): ICD-10-CM

## 2019-07-02 PROCEDURE — 99205 OFFICE O/P NEW HI 60 MIN: CPT | Performed by: INTERNAL MEDICINE

## 2019-07-02 RX ORDER — LEVOTHYROXINE SODIUM 175 UG/1
175 TABLET ORAL DAILY
Qty: 30 TABLET | Refills: 5 | Status: SHIPPED | OUTPATIENT
Start: 2019-07-02 | End: 2019-12-17

## 2019-07-02 RX ORDER — GLIMEPIRIDE 4 MG/1
4 TABLET ORAL EVERY MORNING
Qty: 30 TABLET | Refills: 3 | Status: SHIPPED | OUTPATIENT
Start: 2019-07-02 | End: 2019-11-15 | Stop reason: DRUGHIGH

## 2019-07-02 NOTE — PROGRESS NOTES
08:10 AM    MCV 91.2 06/06/2019 08:10 AM    MCH 29.1 06/06/2019 08:10 AM    MCHC 31.9 (L) 06/06/2019 08:10 AM    RDW 12.6 06/06/2019 08:10 AM     06/06/2019 08:10 AM    MPV 9.3 06/06/2019 08:10 AM      Lab Results   Component Value Date/Time     06/06/2019 08:10 AM    K 4.6 06/06/2019 08:10 AM    CO2 20 (L) 06/06/2019 08:10 AM    BUN 18 06/06/2019 08:10 AM    CREATININE 0.6 06/06/2019 08:10 AM    CALCIUM 9.6 06/06/2019 08:10 AM    LABGLOM >60 06/06/2019 08:10 AM    GFRAA >60 06/06/2019 08:10 AM      Lab Results   Component Value Date/Time    TSH 38.740 (H) 06/06/2019 08:10 AM    T4FREE 0.79 (L) 06/06/2019 08:10 AM     Lab Results   Component Value Date    LABA1C 10.6 06/06/2019    GLUCOSE 268 06/06/2019    MALBCR <30 02/21/2019     Lab Results   Component Value Date    LABA1C 10.6 06/06/2019    LABA1C 11.8 02/18/2019    LABA1C 13.3 11/06/2018     Lab Results   Component Value Date    CHOL 223 06/06/2019    CHOL 234 12/13/2018    TRIG 211 06/06/2019    TRIG 127 12/13/2018    HDL 47 06/06/2019    HDL 57 12/13/2018     No results found for: VITD25    Medical Records/Labs/Images review:   I personally reviewed and summarized previous records   All labs and imaging studies were independently reviewed     ASSESSMENT & RECOMMENDATIONS   Christina AIKEN Vielka Adkins, a 63 y.o.-old female seen in for the following issues     Diabetes Mellitus Type 2     · Patient's diabetes is uncontrol   · Continue Metformin 1000 mg twice a day and Januvia 100 mg daily   · Start Glimepiride 4 mg daily in the morning   · Start small dose of Basaglar insulin 10 units daily at bedtime   · Will likely stop Lantus in 6-8 weeks   · The patient was advised to check blood sugars 4 times a day before meals and at bedtime and send BS readings to our office in a week.   · Discussed with patient A1c and blood sugar goals   · Optimal blood sugars: 100-140 pre-prandial, < 180 peak post-prandial  · The patient counseled about the complications of

## 2019-07-02 NOTE — LETTER
TRIG 211 06/06/2019    TRIG 127 12/13/2018    HDL 47 06/06/2019    HDL 57 12/13/2018     No results found for: [facility] [address] Records/Labs/Images review:   I personally reviewed and summarized previous records   All labs and imaging studies were independently reviewed     ASSESSMENT & RECOMMENDATIONS   Christina ATTILA Ayala, a 63 y.o.-old female seen in for the following issues     Diabetes Mellitus Type 2     · Patient's diabetes is uncontrol   · Continue Metformin 1000 mg twice a day and Januvia 100 mg daily   · Start Glimepiride 4 mg daily in the morning   · Start small dose of Basaglar insulin 10 units daily at bedtime   · Will likely stop Lantus in 6-8 weeks   · The patient was advised to check blood sugars 4 times a day before meals and at bedtime and send BS readings to our office in a week. · Discussed with patient A1c and blood sugar goals   · Optimal blood sugars: 100-140 pre-prandial, < 180 peak post-prandial  · The patient counseled about the complications of uncontrolled diabetes   · Patient was counselled about the importance of self-blood glucose monitoring and eating consistent carb diet to avoid blood sugar fluctuations   · Patient will need routine diabetes maintenance and prevention  · Discussed lifestyle changes including diet and exercise with patient; recommended 150 minutes of moderate intensity exercise per week. · Diabetes labs before next visit     Dietary noncompliance  ? Discussed with patient the importance of eating consistent carbohydrate meals, avoiding high glycemic index food.  Also, discussed with patient the risk and negative consequences of dietary noncompliance on blood glucose control, blood pressure and weight    Hypothyroidism   · Recent labs 6/2019 - TSH 38.7, FT4 0.7  · Will increase levothyroxine to 175 mcg daily  · TFT in 6-8 weeks   · Thyroid US 2/2018 (I have reviewed images myself) --> no thyroid nodules     Obesity

## 2019-07-11 ENCOUNTER — CARE COORDINATION (OUTPATIENT)
Dept: CARE COORDINATION | Age: 59
End: 2019-07-11

## 2019-07-25 ENCOUNTER — HOSPITAL ENCOUNTER (OUTPATIENT)
Age: 59
Discharge: HOME | End: 2019-07-27
Payer: COMMERCIAL

## 2019-07-25 DIAGNOSIS — E03.9 HYPOTHYROIDISM, UNSPECIFIED TYPE: ICD-10-CM

## 2019-07-25 LAB
T4 FREE: 1.68 NG/DL (ref 0.93–1.7)
TSH SERPL DL<=0.05 MIU/L-ACNC: 1.74 UIU/ML (ref 0.27–4.2)

## 2019-07-25 PROCEDURE — 84443 ASSAY THYROID STIM HORMONE: CPT

## 2019-07-25 PROCEDURE — 36415 COLL VENOUS BLD VENIPUNCTURE: CPT

## 2019-07-25 PROCEDURE — 84439 ASSAY OF FREE THYROXINE: CPT

## 2019-07-28 ENCOUNTER — TELEPHONE (OUTPATIENT)
Dept: ENDOCRINOLOGY | Age: 59
End: 2019-07-28

## 2019-07-30 ENCOUNTER — OFFICE VISIT (OUTPATIENT)
Dept: FAMILY MEDICINE CLINIC | Age: 59
End: 2019-07-30
Payer: COMMERCIAL

## 2019-07-30 VITALS
SYSTOLIC BLOOD PRESSURE: 154 MMHG | WEIGHT: 283 LBS | DIASTOLIC BLOOD PRESSURE: 84 MMHG | OXYGEN SATURATION: 98 % | HEIGHT: 64 IN | TEMPERATURE: 97.8 F | HEART RATE: 71 BPM | BODY MASS INDEX: 48.32 KG/M2

## 2019-07-30 DIAGNOSIS — E03.9 ACQUIRED HYPOTHYROIDISM: ICD-10-CM

## 2019-07-30 DIAGNOSIS — Z12.11 COLON CANCER SCREENING: ICD-10-CM

## 2019-07-30 DIAGNOSIS — E78.5 DYSLIPIDEMIA: Primary | ICD-10-CM

## 2019-07-30 DIAGNOSIS — E11.65 UNCONTROLLED TYPE 2 DIABETES MELLITUS WITH HYPERGLYCEMIA (HCC): ICD-10-CM

## 2019-07-30 DIAGNOSIS — I10 ESSENTIAL HYPERTENSION: ICD-10-CM

## 2019-07-30 DIAGNOSIS — F41.1 GAD (GENERALIZED ANXIETY DISORDER): ICD-10-CM

## 2019-07-30 PROCEDURE — 99214 OFFICE O/P EST MOD 30 MIN: CPT | Performed by: FAMILY MEDICINE

## 2019-07-30 RX ORDER — LISINOPRIL 5 MG/1
5 TABLET ORAL DAILY
Qty: 30 TABLET | Refills: 5 | Status: SHIPPED
Start: 2019-07-30 | End: 2020-04-29

## 2019-07-30 ASSESSMENT — ENCOUNTER SYMPTOMS
SORE THROAT: 0
SHORTNESS OF BREATH: 0
COUGH: 0
WHEEZING: 0
ABDOMINAL PAIN: 0

## 2019-07-30 NOTE — PROGRESS NOTES
12/24/1971    Hepatitis B Vaccine (1 of 3 - Risk 3-dose series) 12/24/1980    Cervical cancer screen  12/24/1982    Shingles Vaccine (1 of 2) 12/24/2011    Colon cancer screen colonoscopy  12/24/2011     Colon cancer screen - had colonoscopy 15 years ago, has FIT kit at home    Medications:   Current Outpatient Medications   Medication Sig Dispense Refill    insulin glargine (BASAGLAR KWIKPEN) 100 UNIT/ML injection pen Inject 10 Units into the skin nightly      lisinopril (PRINIVIL;ZESTRIL) 5 MG tablet Take 1 tablet by mouth daily 30 tablet 5    Insulin Pen Needle 32G X 4 MM MISC 1 each by Does not apply route daily 100 each 3    glimepiride (AMARYL) 4 MG tablet Take 1 tablet by mouth every morning 30 tablet 3    metFORMIN (GLUCOPHAGE) 1000 MG tablet Take 1 tablet by mouth 2 times daily (with meals) 180 tablet 5    levothyroxine (SYNTHROID) 175 MCG tablet Take 1 tablet by mouth daily 30 tablet 5    atorvastatin (LIPITOR) 40 MG tablet Take 1 tablet by mouth daily 90 tablet 1    fluticasone (FLONASE) 50 MCG/ACT nasal spray 1 spray by Nasal route daily One spray each nostril daily 1 Bottle 1    sodium chloride (OCEAN) 0.65 % nasal spray 1 spray by Nasal route as needed for Congestion 1 Bottle 1    SITagliptin (JANUVIA) 100 MG tablet Take 1 tablet by mouth daily 30 tablet 2    blood glucose monitor strips Check twice daily 100 strip 1    Blood Glucose Monitoring Suppl (BLOOD GLUCOSE MONITOR SYSTEM) w/Device KIT Check blood glucose twice daily 1 kit 0    Lancets MISC Check blood glucose twice daily 100 each 1    albuterol sulfate HFA (PROVENTIL HFA) 108 (90 Base) MCG/ACT inhaler Inhale 2 puffs into the lungs every 4 hours as needed for Wheezing or Shortness of Breath 1 Inhaler 0    venlafaxine (EFFEXOR XR) 75 MG extended release capsule Take 1 capsule by mouth daily Begin after completing 37.5mg daily x 7 days (Patient not taking: Reported on 7/30/2019) 30 capsule 2     No current facility-administered medications for this visit.         History    Medical      Diagnosis Date    Asthma     Bronchitis     Diabetes mellitus ([address]     Psoriasis     Thyroid disease     Water retention        Surgical  Past Surgical History:   Procedure Laterality Date    CHOLECYSTECTOMY      HYSTERECTOMY         Allergies  Allergies   Allergen Reactions    Pcn [Penicillins]        Family      Problem Relation Age of Onset    Breast Cancer Mother     Colon Cancer Mother 80    Stroke Father     Hypertension Brother        Social History     Socioeconomic History    Marital status: Single     Spouse name: None    Number of children: None    Years of education: None    Highest education level: None   Occupational History     Employer: [employer]     Comment:  in [address] [place] [address] resource strain: None    Food insecurity:     Worry: None     Inability: None    Transportation needs:     Medical: None     Non-medical: None   Tobacco Use    Smoking status: Former Smoker     Packs/day: 0.00     Years: 0.00     Pack years: 0.00     Last attempt to quit: 2018     Years since quittin.0    Smokeless tobacco: Never Used    Tobacco comment: quit    Substance and Sexual Activity    Alcohol use: No    Drug use: No    Sexual activity: None   Lifestyle    Physical activity:     Days per week: None     Minutes per session: None    Stress: None   Relationships    Social connections:     Talks on phone: None     Gets together: None     Attends Orthodox service: None     Active member of club or organization: None     Attends meetings of clubs or organizations: None     Relationship status: None    Intimate partner violence:     Fear of current or ex partner: None     Emotionally abused: None     Physically abused: None     Forced sexual activity: None   Other Topics Concern    None   Social History Narrative    None       OBJECTIVE    BP (!) 154/84 (Site: Right Upper Arm,

## 2019-09-05 ENCOUNTER — TELEPHONE (OUTPATIENT)
Dept: FAMILY MEDICINE CLINIC | Age: 59
End: 2019-09-05

## 2019-09-05 ENCOUNTER — HOSPITAL ENCOUNTER (OUTPATIENT)
Age: 59
Discharge: HOME | End: 2019-09-07
Payer: COMMERCIAL

## 2019-09-05 DIAGNOSIS — E11.65 UNCONTROLLED TYPE 2 DIABETES MELLITUS WITH HYPERGLYCEMIA (HCC): ICD-10-CM

## 2019-09-05 DIAGNOSIS — I10 ESSENTIAL HYPERTENSION: ICD-10-CM

## 2019-09-05 DIAGNOSIS — E78.5 DYSLIPIDEMIA: ICD-10-CM

## 2019-09-05 LAB
ALBUMIN SERPL-MCNC: 3.8 G/DL (ref 3.5–5.2)
ALP BLD-CCNC: 97 U/L (ref 35–104)
ALT SERPL-CCNC: 18 U/L (ref 0–32)
ANION GAP SERPL CALCULATED.3IONS-SCNC: 14 MMOL/L (ref 7–16)
AST SERPL-CCNC: 17 U/L (ref 0–31)
BASOPHILS ABSOLUTE: 0.05 E9/L (ref 0–0.2)
BASOPHILS RELATIVE PERCENT: 0.7 % (ref 0–2)
BILIRUB SERPL-MCNC: 0.3 MG/DL (ref 0–1.2)
BUN BLDV-MCNC: 20 MG/DL (ref 6–20)
CALCIUM SERPL-MCNC: 9.2 MG/DL (ref 8.6–10.2)
CHLORIDE BLD-SCNC: 101 MMOL/L (ref 98–107)
CHOLESTEROL, TOTAL: 227 MG/DL (ref 0–199)
CO2: 26 MMOL/L (ref 22–29)
CREAT SERPL-MCNC: 0.7 MG/DL (ref 0.5–1)
EOSINOPHILS ABSOLUTE: 0.28 E9/L (ref 0.05–0.5)
EOSINOPHILS RELATIVE PERCENT: 4.2 % (ref 0–6)
GFR AFRICAN AMERICAN: >60
GFR NON-AFRICAN AMERICAN: >60 ML/MIN/1.73
GLUCOSE BLD-MCNC: 154 MG/DL (ref 74–99)
HBA1C MFR BLD: 8 % (ref 4–5.6)
HCT VFR BLD CALC: 38.5 % (ref 34–48)
HDLC SERPL-MCNC: 46 MG/DL
HEMOGLOBIN: 12.3 G/DL (ref 11.5–15.5)
IMMATURE GRANULOCYTES #: 0.02 E9/L
IMMATURE GRANULOCYTES %: 0.3 % (ref 0–5)
LDL CHOLESTEROL CALCULATED: 153 MG/DL (ref 0–99)
LYMPHOCYTES ABSOLUTE: 2.2 E9/L (ref 1.5–4)
LYMPHOCYTES RELATIVE PERCENT: 32.7 % (ref 20–42)
MCH RBC QN AUTO: 29.3 PG (ref 26–35)
MCHC RBC AUTO-ENTMCNC: 31.9 % (ref 32–34.5)
MCV RBC AUTO: 91.7 FL (ref 80–99.9)
MONOCYTES ABSOLUTE: 0.54 E9/L (ref 0.1–0.95)
MONOCYTES RELATIVE PERCENT: 8 % (ref 2–12)
NEUTROPHILS ABSOLUTE: 3.63 E9/L (ref 1.8–7.3)
NEUTROPHILS RELATIVE PERCENT: 54.1 % (ref 43–80)
PDW BLD-RTO: 12.7 FL (ref 11.5–15)
PLATELET # BLD: 308 E9/L (ref 130–450)
PMV BLD AUTO: 9.3 FL (ref 7–12)
POTASSIUM SERPL-SCNC: 4.1 MMOL/L (ref 3.5–5)
RBC # BLD: 4.2 E12/L (ref 3.5–5.5)
SODIUM BLD-SCNC: 141 MMOL/L (ref 132–146)
TOTAL PROTEIN: 7.3 G/DL (ref 6.4–8.3)
TRIGL SERPL-MCNC: 142 MG/DL (ref 0–149)
VLDLC SERPL CALC-MCNC: 28 MG/DL
WBC # BLD: 6.7 E9/L (ref 4.5–11.5)

## 2019-09-05 PROCEDURE — 83036 HEMOGLOBIN GLYCOSYLATED A1C: CPT

## 2019-09-05 PROCEDURE — 80053 COMPREHEN METABOLIC PANEL: CPT

## 2019-09-05 PROCEDURE — 36415 COLL VENOUS BLD VENIPUNCTURE: CPT

## 2019-09-05 PROCEDURE — 85025 COMPLETE CBC W/AUTO DIFF WBC: CPT

## 2019-09-05 PROCEDURE — 80061 LIPID PANEL: CPT

## 2019-09-10 ENCOUNTER — OFFICE VISIT (OUTPATIENT)
Dept: FAMILY MEDICINE CLINIC | Age: 59
End: 2019-09-10
Payer: COMMERCIAL

## 2019-09-10 VITALS
DIASTOLIC BLOOD PRESSURE: 78 MMHG | TEMPERATURE: 97.1 F | BODY MASS INDEX: 55.35 KG/M2 | HEART RATE: 65 BPM | WEIGHT: 281.9 LBS | OXYGEN SATURATION: 97 % | HEIGHT: 60 IN | SYSTOLIC BLOOD PRESSURE: 128 MMHG

## 2019-09-10 DIAGNOSIS — Z23 FLU VACCINE NEED: ICD-10-CM

## 2019-09-10 DIAGNOSIS — E03.9 ACQUIRED HYPOTHYROIDISM: ICD-10-CM

## 2019-09-10 DIAGNOSIS — I10 ESSENTIAL HYPERTENSION: ICD-10-CM

## 2019-09-10 DIAGNOSIS — E78.5 DYSLIPIDEMIA: Primary | ICD-10-CM

## 2019-09-10 DIAGNOSIS — E11.65 UNCONTROLLED TYPE 2 DIABETES MELLITUS WITH HYPERGLYCEMIA (HCC): ICD-10-CM

## 2019-09-10 PROCEDURE — 99214 OFFICE O/P EST MOD 30 MIN: CPT | Performed by: FAMILY MEDICINE

## 2019-09-10 PROCEDURE — 90686 IIV4 VACC NO PRSV 0.5 ML IM: CPT | Performed by: FAMILY MEDICINE

## 2019-09-10 PROCEDURE — 90471 IMMUNIZATION ADMIN: CPT | Performed by: FAMILY MEDICINE

## 2019-09-10 RX ORDER — ROSUVASTATIN CALCIUM 20 MG/1
20 TABLET, COATED ORAL NIGHTLY
Qty: 90 TABLET | Refills: 1 | Status: SHIPPED | OUTPATIENT
Start: 2019-09-10 | End: 2019-11-14

## 2019-09-10 ASSESSMENT — ENCOUNTER SYMPTOMS
WHEEZING: 0
SORE THROAT: 0
ABDOMINAL PAIN: 0
COUGH: 0
SHORTNESS OF BREATH: 0

## 2019-09-10 NOTE — PROGRESS NOTES
Christina Moon   Patient is a 63y.o. year old female who presents with:  Chief Complaint   Patient presents with   [address] [employer]     Completed on 9-5-19   [facility] Maintenance     Patient would like flu vaccine, unsure of her last PAP, and has been given her FIT test      Patient also follows with: endocrinology    HPI    DM  Current treatment: metformin 1000mg bid, Januvia 100mg daily, glimepiride 4mg daily, basaglar 10 units nightly  Recent changes in medications: none  Recent blood glucose readings: improved  Following with endocrinology  Lab Results   Component Value Date    LABA1C 8.0 (H) 09/05/2019    LABA1C 10.6 (H) 06/06/2019    LABA1C 11.8 (H) 02/18/2019     Dyslipidemia  Current treatment: atorvastatin 40mg daily   Recent changes in medications: dose was increased   Indications for statin therapy include: 40-76yo, DM and LDL . The patient denies problems associated with the medication. Lab Results   Component Value Date    CHOL 227 (H) 09/05/2019    HDL 46 09/05/2019    LDLCALC 153 (H) 09/05/2019    TRIG 142 09/05/2019     Hypothyroidism  Current treatment: levothyroxine 175ug daily   Recent changes in medication: none  Reports symptoms have gradually improved. Lab Results   Component Value Date    TSH 1.740 07/25/2019    T4FREE 1.68 07/25/2019     HTN  Current treatment: lisinopril 5mg daily  Recent changes in medication: treatment was started last visit. Patient reports home BP is not checked  The patient is not known to have history of CAD. BP Readings from Last 3 Encounters:   09/10/19 128/78   07/30/19 (!) 154/84   07/02/19 (!) 156/82     Review of Systems   Constitutional: Negative for chills, diaphoresis, fever and unexpected weight change. HENT: Negative for congestion and sore throat. Eyes: Negative for visual disturbance. Respiratory: Negative for cough, shortness of breath and wheezing. Cardiovascular: Negative for chest pain, palpitations and leg swelling. diabetes mellitus with hyperglycemia ([facility] [facility]  Improved. Continue as per endocrinology. - Comprehensive Metabolic Panel; Future  - Hemoglobin A1C; Future    5. Flu vaccine need  - INFLUENZA, QUADV, 3 YRS AND OLDER, IM PF, PREFILL SYR OR SDV, 0.5ML (AFLURIA QUADV, PF)    Return in about 3 months (around 12/10/2019) for lab review, or sooner as needed. , Labs are to be done one week prior to next visit. Elida Manzo DO  09/10/19  1:00 PM    There are no Patient Instructions on file for this visit.

## 2019-10-10 ENCOUNTER — HOSPITAL ENCOUNTER (EMERGENCY)
Age: 59
Discharge: HOME | End: 2019-10-10
Payer: COMMERCIAL

## 2019-10-10 VITALS
BODY MASS INDEX: 55.07 KG/M2 | TEMPERATURE: 98.5 F | OXYGEN SATURATION: 96 % | RESPIRATION RATE: 16 BRPM | SYSTOLIC BLOOD PRESSURE: 136 MMHG | DIASTOLIC BLOOD PRESSURE: 82 MMHG | HEART RATE: 77 BPM | WEIGHT: 282 LBS

## 2019-10-10 DIAGNOSIS — H10.31 ACUTE BACTERIAL CONJUNCTIVITIS OF RIGHT EYE: Primary | ICD-10-CM

## 2019-10-10 PROCEDURE — 99212 OFFICE O/P EST SF 10 MIN: CPT

## 2019-10-10 RX ORDER — CIPROFLOXACIN HYDROCHLORIDE 3.5 MG/ML
1 SOLUTION/ DROPS TOPICAL
Qty: 120 DROP | Refills: 0 | Status: SHIPPED | OUTPATIENT
Start: 2019-10-10 | End: 2019-10-20

## 2019-11-14 ENCOUNTER — OFFICE VISIT (OUTPATIENT)
Dept: ENDOCRINOLOGY | Age: 59
End: 2019-11-14
Payer: COMMERCIAL

## 2019-11-14 VITALS
HEART RATE: 60 BPM | BODY MASS INDEX: 47.45 KG/M2 | HEIGHT: 65 IN | DIASTOLIC BLOOD PRESSURE: 80 MMHG | WEIGHT: 284.8 LBS | SYSTOLIC BLOOD PRESSURE: 134 MMHG | RESPIRATION RATE: 16 BRPM | OXYGEN SATURATION: 99 %

## 2019-11-14 DIAGNOSIS — E55.9 VITAMIN D DEFICIENCY: ICD-10-CM

## 2019-11-14 DIAGNOSIS — E03.9 HYPOTHYROIDISM, UNSPECIFIED TYPE: ICD-10-CM

## 2019-11-14 DIAGNOSIS — E11.65 UNCONTROLLED TYPE 2 DIABETES MELLITUS WITH HYPERGLYCEMIA (HCC): Primary | ICD-10-CM

## 2019-11-14 PROCEDURE — 99214 OFFICE O/P EST MOD 30 MIN: CPT | Performed by: INTERNAL MEDICINE

## 2019-11-15 RX ORDER — GLIMEPIRIDE 4 MG/1
TABLET ORAL
Qty: 180 TABLET | Refills: 3 | Status: SHIPPED
Start: 2019-11-15 | End: 2020-09-01 | Stop reason: SDUPTHER

## 2019-12-03 ENCOUNTER — HOSPITAL ENCOUNTER (OUTPATIENT)
Age: 59
Discharge: HOME | End: 2019-12-05
Payer: COMMERCIAL

## 2019-12-03 DIAGNOSIS — I10 ESSENTIAL HYPERTENSION: ICD-10-CM

## 2019-12-03 DIAGNOSIS — E03.9 HYPOTHYROIDISM, UNSPECIFIED TYPE: ICD-10-CM

## 2019-12-03 DIAGNOSIS — E11.65 UNCONTROLLED TYPE 2 DIABETES MELLITUS WITH HYPERGLYCEMIA (HCC): ICD-10-CM

## 2019-12-03 DIAGNOSIS — E78.5 DYSLIPIDEMIA: ICD-10-CM

## 2019-12-03 DIAGNOSIS — E55.9 VITAMIN D DEFICIENCY: ICD-10-CM

## 2019-12-03 LAB
ALBUMIN SERPL-MCNC: 4.1 G/DL (ref 3.5–5.2)
ALP BLD-CCNC: 105 U/L (ref 35–104)
ALT SERPL-CCNC: 22 U/L (ref 0–32)
ANION GAP SERPL CALCULATED.3IONS-SCNC: 18 MMOL/L (ref 7–16)
AST SERPL-CCNC: 18 U/L (ref 0–31)
BASOPHILS ABSOLUTE: 0.04 E9/L (ref 0–0.2)
BASOPHILS RELATIVE PERCENT: 0.6 % (ref 0–2)
BILIRUB SERPL-MCNC: 0.3 MG/DL (ref 0–1.2)
BUN BLDV-MCNC: 15 MG/DL (ref 6–20)
CALCIUM SERPL-MCNC: 9.2 MG/DL (ref 8.6–10.2)
CHLORIDE BLD-SCNC: 105 MMOL/L (ref 98–107)
CHOLESTEROL, TOTAL: 212 MG/DL (ref 0–199)
CO2: 19 MMOL/L (ref 22–29)
CREAT SERPL-MCNC: 0.7 MG/DL (ref 0.5–1)
CREATININE URINE: 103 MG/DL (ref 29–226)
EOSINOPHILS ABSOLUTE: 0.27 E9/L (ref 0.05–0.5)
EOSINOPHILS RELATIVE PERCENT: 4.2 % (ref 0–6)
GFR AFRICAN AMERICAN: >60
GFR NON-AFRICAN AMERICAN: >60 ML/MIN/1.73
GLUCOSE BLD-MCNC: 172 MG/DL (ref 74–99)
HBA1C MFR BLD: 7.7 % (ref 4–5.6)
HCT VFR BLD CALC: 39.3 % (ref 34–48)
HDLC SERPL-MCNC: 49 MG/DL
HEMOGLOBIN: 12.2 G/DL (ref 11.5–15.5)
IMMATURE GRANULOCYTES #: 0.02 E9/L
IMMATURE GRANULOCYTES %: 0.3 % (ref 0–5)
LDL CHOLESTEROL CALCULATED: 134 MG/DL (ref 0–99)
LYMPHOCYTES ABSOLUTE: 1.84 E9/L (ref 1.5–4)
LYMPHOCYTES RELATIVE PERCENT: 28.5 % (ref 20–42)
MCH RBC QN AUTO: 28.6 PG (ref 26–35)
MCHC RBC AUTO-ENTMCNC: 31 % (ref 32–34.5)
MCV RBC AUTO: 92.3 FL (ref 80–99.9)
MICROALBUMIN UR-MCNC: <12 MG/L
MICROALBUMIN/CREAT UR-RTO: ABNORMAL (ref 0–30)
MONOCYTES ABSOLUTE: 0.61 E9/L (ref 0.1–0.95)
MONOCYTES RELATIVE PERCENT: 9.4 % (ref 2–12)
NEUTROPHILS ABSOLUTE: 3.68 E9/L (ref 1.8–7.3)
NEUTROPHILS RELATIVE PERCENT: 57 % (ref 43–80)
PDW BLD-RTO: 12.9 FL (ref 11.5–15)
PLATELET # BLD: 296 E9/L (ref 130–450)
PMV BLD AUTO: 9.3 FL (ref 7–12)
POTASSIUM SERPL-SCNC: 4.6 MMOL/L (ref 3.5–5)
RBC # BLD: 4.26 E12/L (ref 3.5–5.5)
SODIUM BLD-SCNC: 142 MMOL/L (ref 132–146)
T4 FREE: 1.32 NG/DL (ref 0.93–1.7)
TOTAL PROTEIN: 7.2 G/DL (ref 6.4–8.3)
TRIGL SERPL-MCNC: 144 MG/DL (ref 0–149)
TSH SERPL DL<=0.05 MIU/L-ACNC: 6.4 UIU/ML (ref 0.27–4.2)
VITAMIN D 25-HYDROXY: 15 NG/ML (ref 30–100)
VLDLC SERPL CALC-MCNC: 29 MG/DL
WBC # BLD: 6.5 E9/L (ref 4.5–11.5)

## 2019-12-03 PROCEDURE — 82306 VITAMIN D 25 HYDROXY: CPT

## 2019-12-03 PROCEDURE — 80061 LIPID PANEL: CPT

## 2019-12-03 PROCEDURE — 83036 HEMOGLOBIN GLYCOSYLATED A1C: CPT

## 2019-12-03 PROCEDURE — 84443 ASSAY THYROID STIM HORMONE: CPT

## 2019-12-03 PROCEDURE — 84439 ASSAY OF FREE THYROXINE: CPT

## 2019-12-03 PROCEDURE — 82570 ASSAY OF URINE CREATININE: CPT

## 2019-12-03 PROCEDURE — 82044 UR ALBUMIN SEMIQUANTITATIVE: CPT

## 2019-12-03 PROCEDURE — 80053 COMPREHEN METABOLIC PANEL: CPT

## 2019-12-03 PROCEDURE — 85025 COMPLETE CBC W/AUTO DIFF WBC: CPT

## 2019-12-03 PROCEDURE — 36415 COLL VENOUS BLD VENIPUNCTURE: CPT

## 2019-12-06 ENCOUNTER — TELEPHONE (OUTPATIENT)
Dept: ENDOCRINOLOGY | Age: 59
End: 2019-12-06

## 2019-12-06 NOTE — TELEPHONE ENCOUNTER
Notify pt,  I have reviewed your recent results    Thyroid hormones level was low.  Please confirm current dose of thyroid medication and also check is was skipping any doses

## 2019-12-10 ENCOUNTER — OFFICE VISIT (OUTPATIENT)
Dept: FAMILY MEDICINE CLINIC | Age: 59
End: 2019-12-10
Payer: COMMERCIAL

## 2019-12-10 VITALS
TEMPERATURE: 98.1 F | HEART RATE: 61 BPM | OXYGEN SATURATION: 97 % | WEIGHT: 289.2 LBS | DIASTOLIC BLOOD PRESSURE: 86 MMHG | BODY MASS INDEX: 48.18 KG/M2 | SYSTOLIC BLOOD PRESSURE: 138 MMHG | HEIGHT: 65 IN

## 2019-12-10 DIAGNOSIS — E55.9 VITAMIN D DEFICIENCY: ICD-10-CM

## 2019-12-10 DIAGNOSIS — Z76.0 MEDICATION REFILL: ICD-10-CM

## 2019-12-10 DIAGNOSIS — I10 ESSENTIAL HYPERTENSION: Primary | ICD-10-CM

## 2019-12-10 DIAGNOSIS — E03.9 ACQUIRED HYPOTHYROIDISM: ICD-10-CM

## 2019-12-10 DIAGNOSIS — E11.65 UNCONTROLLED TYPE 2 DIABETES MELLITUS WITH HYPERGLYCEMIA (HCC): ICD-10-CM

## 2019-12-10 DIAGNOSIS — E78.5 DYSLIPIDEMIA: ICD-10-CM

## 2019-12-10 PROCEDURE — 99214 OFFICE O/P EST MOD 30 MIN: CPT | Performed by: FAMILY MEDICINE

## 2019-12-10 RX ORDER — ROSUVASTATIN CALCIUM 40 MG/1
40 TABLET, COATED ORAL DAILY
Qty: 90 TABLET | Refills: 1 | Status: SHIPPED
Start: 2019-12-10 | End: 2020-09-01 | Stop reason: SDUPTHER

## 2019-12-10 ASSESSMENT — ENCOUNTER SYMPTOMS
WHEEZING: 0
SORE THROAT: 0
SHORTNESS OF BREATH: 0
ABDOMINAL PAIN: 0
COUGH: 0

## 2019-12-16 NOTE — TELEPHONE ENCOUNTER
Christina said she is out of her thyroid medication.  Refill to go to [facility]

## 2019-12-17 RX ORDER — LEVOTHYROXINE SODIUM 0.2 MG/1
200 TABLET ORAL DAILY
Qty: 90 TABLET | Refills: 5 | Status: SHIPPED
Start: 2019-12-17 | End: 2021-03-01 | Stop reason: SDUPTHER

## 2019-12-17 NOTE — TELEPHONE ENCOUNTER
jose was taking Levothyroxine 175 mcg. She was notified of new dose and scripts needing done prior to next visit. Labs placed in mail.

## 2020-02-20 ENCOUNTER — OFFICE VISIT (OUTPATIENT)
Dept: FAMILY MEDICINE CLINIC | Age: 60
End: 2020-02-20
Payer: COMMERCIAL

## 2020-02-20 VITALS
DIASTOLIC BLOOD PRESSURE: 68 MMHG | WEIGHT: 290 LBS | HEIGHT: 64 IN | RESPIRATION RATE: 16 BRPM | TEMPERATURE: 98 F | OXYGEN SATURATION: 97 % | SYSTOLIC BLOOD PRESSURE: 152 MMHG | BODY MASS INDEX: 49.51 KG/M2 | HEART RATE: 77 BPM

## 2020-02-20 PROCEDURE — 99213 OFFICE O/P EST LOW 20 MIN: CPT | Performed by: FAMILY MEDICINE

## 2020-02-20 RX ORDER — METHYLPREDNISOLONE 4 MG/1
TABLET ORAL
Qty: 1 KIT | Refills: 0 | Status: SHIPPED | OUTPATIENT
Start: 2020-02-20 | End: 2020-02-26

## 2020-02-20 RX ORDER — FLUTICASONE FUROATE AND VILANTEROL 200; 25 UG/1; UG/1
1 POWDER RESPIRATORY (INHALATION) DAILY
Qty: 1 EACH | Refills: 2 | Status: SHIPPED
Start: 2020-02-20 | End: 2021-03-01 | Stop reason: SDUPTHER

## 2020-02-20 RX ORDER — AZITHROMYCIN 250 MG/1
250 TABLET, FILM COATED ORAL SEE ADMIN INSTRUCTIONS
Qty: 6 TABLET | Refills: 0 | Status: SHIPPED | OUTPATIENT
Start: 2020-02-20 | End: 2020-02-25

## 2020-02-20 ASSESSMENT — ENCOUNTER SYMPTOMS
SORE THROAT: 0
COUGH: 1
CHEST TIGHTNESS: 1
WHEEZING: 1
SHORTNESS OF BREATH: 1

## 2020-02-20 ASSESSMENT — PATIENT HEALTH QUESTIONNAIRE - PHQ9
SUM OF ALL RESPONSES TO PHQ QUESTIONS 1-9: 0
SUM OF ALL RESPONSES TO PHQ QUESTIONS 1-9: 0
1. LITTLE INTEREST OR PLEASURE IN DOING THINGS: 0
SUM OF ALL RESPONSES TO PHQ9 QUESTIONS 1 & 2: 0
2. FEELING DOWN, DEPRESSED OR HOPELESS: 0

## 2020-02-20 NOTE — PROGRESS NOTES
daily 90 tablet 1    glimepiride (AMARYL) 4 MG tablet Take 4 mg twice a day 180 tablet 3    insulin glargine (BASAGLAR KWIKPEN) 100 UNIT/ML injection pen Inject 10 Units into the skin nightly      lisinopril (PRINIVIL;ZESTRIL) 5 MG tablet Take 1 tablet by mouth daily 30 tablet 5    Insulin Pen Needle 32G X 4 MM MISC 1 each by Does not apply route daily 100 each 3    metFORMIN (GLUCOPHAGE) 1000 MG tablet Take 1 tablet by mouth 2 times daily (with meals) 180 tablet 5    sodium chloride (OCEAN) 0.65 % nasal spray 1 spray by Nasal route as needed for Congestion 1 Bottle 1    blood glucose monitor strips Check twice daily 100 strip 1    Blood Glucose Monitoring Suppl (BLOOD GLUCOSE MONITOR SYSTEM) w/Device KIT Check blood glucose twice daily 1 kit 0    Lancets MISC Check blood glucose twice daily 100 each 1    albuterol sulfate HFA (PROVENTIL HFA) 108 (90 Base) MCG/ACT inhaler Inhale 2 puffs into the lungs every 4 hours as needed for Wheezing or Shortness of Breath 1 Inhaler 0    Cholecalciferol 1.25 MG (17654 UT) TABS Take 1 tablet by mouth once a week (Patient not taking: Reported on 2/20/2020) 4 tablet 2    fluticasone (FLONASE) 50 MCG/ACT nasal spray 1 spray by Nasal route daily One spray each nostril daily (Patient not taking: Reported on 2/20/2020) 1 Bottle 1     No current facility-administered medications for this visit.         History    Past Medical History:   Diagnosis Date    Asthma     Bronchitis     Diabetes mellitus ([address]     Psoriasis     Thyroid disease     Water retention        Past Surgical History:   Procedure Laterality Date    CHOLECYSTECTOMY      HYSTERECTOMY         Allergies   Allergen Reactions    Pcn [Penicillins]        Family History   Problem Relation Age of Onset    Breast Cancer Mother     Colon Cancer Mother 80    Stroke Father     Hypertension Brother        Social History     Socioeconomic History    Marital status: Single     Spouse name: None    Number of children: None    Years of education: None    Highest education level: None   Occupational History     Employer: [employer]     Comment:  in [address] [place] [address] resource strain: None    Food insecurity:     Worry: None     Inability: None    Transportation needs:     Medical: None     Non-medical: None   Tobacco Use    Smoking status: Former Smoker     Packs/day: 0.00     Years: 0.00     Pack years: 0.00     Last attempt to quit: 2018     Years since quittin.5    Smokeless tobacco: Never Used    Tobacco comment: quit    Substance and Sexual Activity    Alcohol use: No    Drug use: No    Sexual activity: None   Lifestyle    Physical activity:     Days per week: None     Minutes per session: None    Stress: None   Relationships    Social connections:     Talks on phone: None     Gets together: None     Attends [employer]ist service: None     Active member of club or organization: None     Attends meetings of clubs or organizations: None     Relationship status: None    Intimate partner violence:     Fear of current or ex partner: None     Emotionally abused: None     Physically abused: None     Forced sexual activity: None   Other Topics Concern    None   Social History Narrative    None       OBJECTIVE    BP (!) 152/68 (Site: Right Upper Arm, Position: Sitting, Cuff Size: Large Adult)   Pulse 77   Temp 98 °F (36.7 °C) (Oral)   Resp 16   Ht 5' 4.49\" (1.638 m)   Wt 290 lb (131.5 kg)   SpO2 97%   BMI 49.03 kg/m²     Wt Readings from Last 3 Encounters:   20 290 lb (131.5 kg)   12/10/19 289 lb 3.2 oz (131.2 kg)   19 284 lb 12.8 oz (129.2 kg)       Physical Exam  Constitutional:       General: She is not in acute distress. HENT:      Head: Normocephalic and atraumatic. Right Ear: Tympanic membrane normal.      Left Ear: Tympanic membrane normal.      Nose: Congestion and rhinorrhea present.       Mouth/Throat:      Mouth: Mucous membranes are

## 2020-03-10 ENCOUNTER — HOSPITAL ENCOUNTER (OUTPATIENT)
Age: 60
Discharge: HOME | End: 2020-03-12
Payer: COMMERCIAL

## 2020-03-10 LAB
ANION GAP SERPL CALCULATED.3IONS-SCNC: 15 MMOL/L (ref 7–16)
BUN BLDV-MCNC: 17 MG/DL (ref 6–20)
CALCIUM SERPL-MCNC: 9.2 MG/DL (ref 8.6–10.2)
CHLORIDE BLD-SCNC: 102 MMOL/L (ref 98–107)
CO2: 23 MMOL/L (ref 22–29)
CREAT SERPL-MCNC: 0.6 MG/DL (ref 0.5–1)
GFR AFRICAN AMERICAN: >60
GFR NON-AFRICAN AMERICAN: >60 ML/MIN/1.73
GLUCOSE BLD-MCNC: 186 MG/DL (ref 74–99)
HBA1C MFR BLD: 9.4 % (ref 4–5.6)
POTASSIUM SERPL-SCNC: 4.6 MMOL/L (ref 3.5–5)
SODIUM BLD-SCNC: 140 MMOL/L (ref 132–146)
T4 FREE: 1.77 NG/DL (ref 0.93–1.7)
TSH SERPL DL<=0.05 MIU/L-ACNC: 0.49 UIU/ML (ref 0.27–4.2)

## 2020-03-10 PROCEDURE — 36415 COLL VENOUS BLD VENIPUNCTURE: CPT

## 2020-03-10 PROCEDURE — 84439 ASSAY OF FREE THYROXINE: CPT

## 2020-03-10 PROCEDURE — 80048 BASIC METABOLIC PNL TOTAL CA: CPT

## 2020-03-10 PROCEDURE — 83036 HEMOGLOBIN GLYCOSYLATED A1C: CPT

## 2020-03-10 PROCEDURE — 84443 ASSAY THYROID STIM HORMONE: CPT

## 2020-03-18 ENCOUNTER — OFFICE VISIT (OUTPATIENT)
Dept: FAMILY MEDICINE CLINIC | Age: 60
End: 2020-03-18
Payer: COMMERCIAL

## 2020-03-18 VITALS
HEIGHT: 64 IN | HEART RATE: 84 BPM | SYSTOLIC BLOOD PRESSURE: 146 MMHG | OXYGEN SATURATION: 98 % | WEIGHT: 292 LBS | DIASTOLIC BLOOD PRESSURE: 64 MMHG | RESPIRATION RATE: 20 BRPM | TEMPERATURE: 97.2 F | BODY MASS INDEX: 49.85 KG/M2

## 2020-03-18 PROCEDURE — 99214 OFFICE O/P EST MOD 30 MIN: CPT | Performed by: FAMILY MEDICINE

## 2020-03-18 RX ORDER — VENLAFAXINE HYDROCHLORIDE 75 MG/1
CAPSULE, EXTENDED RELEASE ORAL
COMMUNITY
Start: 2019-04-30 | End: 2020-03-18

## 2020-03-18 RX ORDER — VENLAFAXINE HYDROCHLORIDE 37.5 MG/1
37.5 CAPSULE, EXTENDED RELEASE ORAL DAILY
Qty: 7 CAPSULE | Refills: 0 | Status: SHIPPED
Start: 2020-03-18 | End: 2020-04-21 | Stop reason: ALTCHOICE

## 2020-03-18 RX ORDER — VENLAFAXINE HYDROCHLORIDE 75 MG/1
75 CAPSULE, EXTENDED RELEASE ORAL DAILY
Qty: 30 CAPSULE | Refills: 2 | Status: SHIPPED
Start: 2020-03-18 | End: 2020-12-29 | Stop reason: SDUPTHER

## 2020-03-18 ASSESSMENT — ENCOUNTER SYMPTOMS
COUGH: 0
SHORTNESS OF BREATH: 0
ABDOMINAL PAIN: 0
WHEEZING: 0
SORE THROAT: 0

## 2020-03-18 NOTE — PROGRESS NOTES
restart  Recent changes in medication: none    Vitamin D deficiency  Current treatment: cholecalciferol 50k units weekly - prescribed but never started  Recent changes in medication: treatment was started 12/2019  Lab Results   Component Value Date    VITD25 15 (L) 12/03/2019       Review of Systems   Constitutional: Negative for chills, diaphoresis, fever and unexpected weight change. HENT: Negative for congestion and sore throat. Eyes: Negative for visual disturbance. Respiratory: Negative for cough, shortness of breath and wheezing. Cardiovascular: Negative for chest pain, palpitations and leg swelling. Gastrointestinal: Negative for abdominal pain. Musculoskeletal: Positive for arthralgias. Neurological: Negative for weakness and numbness. Psychiatric/Behavioral: Positive for dysphoric mood. Negative for confusion. The patient is nervous/anxious.       Health Maintenance Due   Topic Date Due    Diabetic foot exam  12/24/1971    Cervical cancer screen  12/24/1982    Shingles Vaccine (1 of 2) 12/24/2011    Colon cancer screen colonoscopy  12/24/2011    Hepatitis B vaccine (3 of 3 - Risk 3-dose series) 12/07/2013    Diabetic retinal exam  02/18/2020     Colon cancer screen - had colonoscopy 15 years ago, has FIT kit at home    Medications:   Current Outpatient Medications   Medication Sig Dispense Refill    Insulin Pen Needle 32G X 4 MM MISC 1 each by Does not apply route daily 100 each 3    venlafaxine (EFFEXOR XR) 37.5 MG extended release capsule Take 1 capsule by mouth daily for 7 days 7 capsule 0    venlafaxine (EFFEXOR XR) 75 MG extended release capsule Take 1 capsule by mouth daily Begin after completing 37.5mg daily x 7 days 30 capsule 2    Cholecalciferol 1.25 MG (91792 UT) TABS Take 1 tablet by mouth once a week 4 tablet 5    Fluticasone furoate-vilanterol (BREO ELLIPTA) 200-25 MCG/INH AEPB inhaler Inhale 1 puff into the lungs daily 1 each 2    levothyroxine (SYNTHROID) 200 MCG tablet Take 1 tablet by mouth daily 90 tablet 5    rosuvastatin (CRESTOR) 40 MG tablet Take 1 tablet by mouth daily 90 tablet 1    glimepiride (AMARYL) 4 MG tablet Take 4 mg twice a day 180 tablet 3    lisinopril (PRINIVIL;ZESTRIL) 5 MG tablet Take 1 tablet by mouth daily 30 tablet 5    metFORMIN (GLUCOPHAGE) 1000 MG tablet Take 1 tablet by mouth 2 times daily (with meals) 180 tablet 5    fluticasone (FLONASE) 50 MCG/ACT nasal spray 1 spray by Nasal route daily One spray each nostril daily 1 Bottle 1    sodium chloride (OCEAN) 0.65 % nasal spray 1 spray by Nasal route as needed for Congestion 1 Bottle 1    blood glucose monitor strips Check twice daily 100 strip 1    Blood Glucose Monitoring Suppl (BLOOD GLUCOSE MONITOR SYSTEM) w/Device KIT Check blood glucose twice daily 1 kit 0    Lancets MISC Check blood glucose twice daily 100 each 1    albuterol sulfate HFA (PROVENTIL HFA) 108 (90 Base) MCG/ACT inhaler Inhale 2 puffs into the lungs every 4 hours as needed for Wheezing or Shortness of Breath 1 Inhaler 0    insulin glargine (BASAGLAR KWIKPEN) 100 UNIT/ML injection pen Inject 10 Units into the skin nightly       No current facility-administered medications for this visit.         History    Medical      Diagnosis Date    Asthma     Bronchitis     Diabetes mellitus ([address]     Psoriasis     Thyroid disease     Water retention        Surgical  Past Surgical History:   Procedure Laterality Date    CHOLECYSTECTOMY      HYSTERECTOMY         Allergies  Allergies   Allergen Reactions    Pcn [Penicillins]        Family      Problem Relation Age of Onset    Breast Cancer Mother     Colon Cancer Mother 80    Stroke Father     Hypertension Brother        Social History     Socioeconomic History    Marital status: Single     Spouse name: None    Number of children: None    Years of education: None    Highest education level: None   Occupational History     Employer: [employer]     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 Skin:     General: Skin is warm and dry. Neurological:      General: No focal deficit present. Mental Status: She is alert and oriented to person, place, and time. Psychiatric:         Mood and Affect: Mood normal.         Speech: Speech normal.         Behavior: Behavior normal.       ASSESSMENT AND PLAN    1. IDDM (insulin dependent diabetes mellitus) (HCC)  Interval worsening, continue as per endocrinology  - Comprehensive Metabolic Panel; Future  - Hemoglobin A1C; Future    2. Acquired hypothyroidism  Controlled, continue current treatment as per endocrinology. 3. Essential hypertension  Mildly elevated SBP, DBP well wnl. Continue current treatment for now and reassess next visit. - Comprehensive Metabolic Panel; Future  - CBC Auto Differential; Future    4. Dyslipidemia  FLP was not done as was ordered. Continue current treatment and obtain relevant lab work for review next visit. - Comprehensive Metabolic Panel; Future  - Lipid Panel; Future    5. Moderate persistent asthma with acute exacerbation  Controlled, continue current treatment. - Comprehensive Metabolic Panel; Future  - CBC Auto Differential; Future    6. Vitamin D deficiency  Begin vit D supplementation  - Cholecalciferol 1.25 MG (50418 UT) TABS; Take 1 tablet by mouth once a week  Dispense: 4 tablet; Refill: 5  - Vitamin D 25 Hydroxy; Future    7. MARIELY (generalized anxiety disorder)  Begin effexor  - venlafaxine (EFFEXOR XR) 37.5 MG extended release capsule; Take 1 capsule by mouth daily for 7 days  Dispense: 7 capsule; Refill: 0  - venlafaxine (EFFEXOR XR) 75 MG extended release capsule; Take 1 capsule by mouth daily Begin after completing 37.5mg daily x 7 days  Dispense: 30 capsule; Refill: 2    8. Medication refill  - Insulin Pen Needle 32G X 4 MM MISC; 1 each by Does not apply route daily  Dispense: 100 each;  Refill: 3    Return in about 3 months (around 6/18/2020) for DM, HTN, HLD, vit d def, Labs are to be done one week prior to next visit. Angela Lopez,   03/18/20  10:04 AM    There are no Patient Instructions on file for this visit.

## 2020-04-21 ENCOUNTER — TELEMEDICINE (OUTPATIENT)
Dept: FAMILY MEDICINE CLINIC | Age: 60
End: 2020-04-21
Payer: COMMERCIAL

## 2020-04-21 PROCEDURE — 99213 OFFICE O/P EST LOW 20 MIN: CPT | Performed by: FAMILY MEDICINE

## 2020-04-21 ASSESSMENT — ENCOUNTER SYMPTOMS
COUGH: 1
DIARRHEA: 0
SHORTNESS OF BREATH: 0
ABDOMINAL PAIN: 0

## 2020-04-21 NOTE — LETTER
[address] [address]  Phone: [phone]  Fax: Cornel Locke DO        April 21, 2020     Patient: Elmo Gowers   YOB: 1960   Date of Visit: 4/21/2020       To Whom It May Concern: It is my medical opinion that Joseph Olsen may return to work on 4/22/2020. If you have any questions or concerns, please don't hesitate to call.     Sincerely,        Verlin Kawasaki, DO

## 2020-04-21 NOTE — PROGRESS NOTES
to quarantine for 14 days. Requests letter stating she is able to return to work. Plans to return to work tomorrow. Would like faxed to work at [phone]    Review of Systems   Constitutional: Negative for chills and fever. Respiratory: Positive for cough. Negative for shortness of breath. Cardiovascular: Negative for chest pain. Gastrointestinal: Negative for abdominal pain and diarrhea. Musculoskeletal: Negative for myalgias.        Health Maintenance Due   Topic Date Due    Diabetic foot exam  12/24/1971    Cervical cancer screen  12/24/1982    Shingles Vaccine (1 of 2) 12/24/2011    Colon cancer screen colonoscopy  12/24/2011    Hepatitis B vaccine (3 of 3 - Risk 3-dose series) 12/07/2013    Diabetic retinal exam  02/18/2020    Breast cancer screen  04/30/2020       Current Outpatient Medications   Medication Sig Dispense Refill    Insulin Pen Needle 32G X 4 MM MISC 1 each by Does not apply route daily 100 each 3    venlafaxine (EFFEXOR XR) 75 MG extended release capsule Take 1 capsule by mouth daily Begin after completing 37.5mg daily x 7 days 30 capsule 2    Cholecalciferol 1.25 MG (34953 UT) TABS Take 1 tablet by mouth once a week 4 tablet 5    Fluticasone furoate-vilanterol (BREO ELLIPTA) 200-25 MCG/INH AEPB inhaler Inhale 1 puff into the lungs daily 1 each 2    levothyroxine (SYNTHROID) 200 MCG tablet Take 1 tablet by mouth daily 90 tablet 5    rosuvastatin (CRESTOR) 40 MG tablet Take 1 tablet by mouth daily 90 tablet 1    glimepiride (AMARYL) 4 MG tablet Take 4 mg twice a day 180 tablet 3    insulin glargine (BASAGLAR KWIKPEN) 100 UNIT/ML injection pen Inject 10 Units into the skin nightly      lisinopril (PRINIVIL;ZESTRIL) 5 MG tablet Take 1 tablet by mouth daily 30 tablet 5    metFORMIN (GLUCOPHAGE) 1000 MG tablet Take 1 tablet by mouth 2 times daily (with meals) 180 tablet 5    fluticasone (FLONASE) 50 MCG/ACT nasal spray 1 spray by Nasal route daily One spray each

## 2020-08-31 ENCOUNTER — HOSPITAL ENCOUNTER (OUTPATIENT)
Age: 60
Discharge: HOME | End: 2020-09-02
Payer: COMMERCIAL

## 2020-08-31 LAB
ALBUMIN SERPL-MCNC: 3.9 G/DL (ref 3.5–5.2)
ALP BLD-CCNC: 206 U/L (ref 35–104)
ALT SERPL-CCNC: 28 U/L (ref 0–32)
ANION GAP SERPL CALCULATED.3IONS-SCNC: 15 MMOL/L (ref 7–16)
AST SERPL-CCNC: 25 U/L (ref 0–31)
BASOPHILS ABSOLUTE: 0.06 E9/L (ref 0–0.2)
BASOPHILS RELATIVE PERCENT: 0.9 % (ref 0–2)
BILIRUB SERPL-MCNC: 0.4 MG/DL (ref 0–1.2)
BUN BLDV-MCNC: 14 MG/DL (ref 6–20)
CALCIUM SERPL-MCNC: 9.4 MG/DL (ref 8.6–10.2)
CHLORIDE BLD-SCNC: 97 MMOL/L (ref 98–107)
CHOLESTEROL, TOTAL: 214 MG/DL (ref 0–199)
CO2: 23 MMOL/L (ref 22–29)
CREAT SERPL-MCNC: 0.6 MG/DL (ref 0.5–1)
EOSINOPHILS ABSOLUTE: 0.24 E9/L (ref 0.05–0.5)
EOSINOPHILS RELATIVE PERCENT: 3.7 % (ref 0–6)
GFR AFRICAN AMERICAN: >60
GFR NON-AFRICAN AMERICAN: >60 ML/MIN/1.73
GLUCOSE BLD-MCNC: 374 MG/DL (ref 74–99)
HBA1C MFR BLD: 11.3 % (ref 4–5.6)
HCT VFR BLD CALC: 40.1 % (ref 34–48)
HDLC SERPL-MCNC: 56 MG/DL
HEMOGLOBIN: 12.7 G/DL (ref 11.5–15.5)
IMMATURE GRANULOCYTES #: 0.02 E9/L
IMMATURE GRANULOCYTES %: 0.3 % (ref 0–5)
LDL CHOLESTEROL CALCULATED: 128 MG/DL (ref 0–99)
LYMPHOCYTES ABSOLUTE: 2.21 E9/L (ref 1.5–4)
LYMPHOCYTES RELATIVE PERCENT: 33.9 % (ref 20–42)
MCH RBC QN AUTO: 28.7 PG (ref 26–35)
MCHC RBC AUTO-ENTMCNC: 31.7 % (ref 32–34.5)
MCV RBC AUTO: 90.7 FL (ref 80–99.9)
MONOCYTES ABSOLUTE: 0.52 E9/L (ref 0.1–0.95)
MONOCYTES RELATIVE PERCENT: 8 % (ref 2–12)
NEUTROPHILS ABSOLUTE: 3.46 E9/L (ref 1.8–7.3)
NEUTROPHILS RELATIVE PERCENT: 53.2 % (ref 43–80)
PDW BLD-RTO: 12.5 FL (ref 11.5–15)
PLATELET # BLD: 278 E9/L (ref 130–450)
PMV BLD AUTO: 9.9 FL (ref 7–12)
POTASSIUM SERPL-SCNC: 4.9 MMOL/L (ref 3.5–5)
RBC # BLD: 4.42 E12/L (ref 3.5–5.5)
SODIUM BLD-SCNC: 135 MMOL/L (ref 132–146)
TOTAL PROTEIN: 6.9 G/DL (ref 6.4–8.3)
TRIGL SERPL-MCNC: 152 MG/DL (ref 0–149)
VITAMIN D 25-HYDROXY: 30 NG/ML (ref 30–100)
VLDLC SERPL CALC-MCNC: 30 MG/DL
WBC # BLD: 6.5 E9/L (ref 4.5–11.5)

## 2020-08-31 PROCEDURE — 83036 HEMOGLOBIN GLYCOSYLATED A1C: CPT

## 2020-08-31 PROCEDURE — 36415 COLL VENOUS BLD VENIPUNCTURE: CPT

## 2020-08-31 PROCEDURE — 85025 COMPLETE CBC W/AUTO DIFF WBC: CPT

## 2020-08-31 PROCEDURE — 80061 LIPID PANEL: CPT

## 2020-08-31 PROCEDURE — 82306 VITAMIN D 25 HYDROXY: CPT

## 2020-08-31 PROCEDURE — 80053 COMPREHEN METABOLIC PANEL: CPT

## 2020-09-01 ENCOUNTER — OFFICE VISIT (OUTPATIENT)
Dept: FAMILY MEDICINE CLINIC | Age: 60
End: 2020-09-01
Payer: COMMERCIAL

## 2020-09-01 VITALS
HEIGHT: 64 IN | WEIGHT: 287 LBS | SYSTOLIC BLOOD PRESSURE: 134 MMHG | RESPIRATION RATE: 18 BRPM | TEMPERATURE: 96.9 F | OXYGEN SATURATION: 97 % | HEART RATE: 63 BPM | BODY MASS INDEX: 49 KG/M2 | DIASTOLIC BLOOD PRESSURE: 78 MMHG

## 2020-09-01 PROCEDURE — 99214 OFFICE O/P EST MOD 30 MIN: CPT | Performed by: FAMILY MEDICINE

## 2020-09-01 RX ORDER — GLIMEPIRIDE 4 MG/1
TABLET ORAL
Qty: 180 TABLET | Refills: 1 | Status: SHIPPED
Start: 2020-09-01 | End: 2021-04-08 | Stop reason: SDUPTHER

## 2020-09-01 RX ORDER — INSULIN GLARGINE 100 [IU]/ML
20 INJECTION, SOLUTION SUBCUTANEOUS NIGHTLY
Qty: 5 PEN | Refills: 5 | Status: SHIPPED
Start: 2020-09-01 | End: 2021-04-08 | Stop reason: SDUPTHER

## 2020-09-01 RX ORDER — FLUCONAZOLE 150 MG/1
150 TABLET ORAL ONCE
Qty: 2 TABLET | Refills: 0 | Status: SHIPPED | OUTPATIENT
Start: 2020-09-01 | End: 2020-09-01

## 2020-09-01 RX ORDER — ROSUVASTATIN CALCIUM 40 MG/1
40 TABLET, COATED ORAL DAILY
Qty: 90 TABLET | Refills: 1 | Status: SHIPPED
Start: 2020-09-01 | End: 2021-04-08 | Stop reason: SDUPTHER

## 2020-09-01 ASSESSMENT — ENCOUNTER SYMPTOMS
SHORTNESS OF BREATH: 0
BACK PAIN: 0
NAUSEA: 0
VOMITING: 0
ABDOMINAL PAIN: 0
SINUS PAIN: 0
SORE THROAT: 0
DIARRHEA: 0
CONSTIPATION: 0
RHINORRHEA: 0
COUGH: 0

## 2020-09-01 NOTE — PROGRESS NOTES
2020    Chief Complaint   Patient presents with   [facility] Establish Care     patient present here to establish with new provider but also c/o yeast infection x 2 weeks has been using OTC but not helping Former Dr Motta patient who was being treated for HTN,IDDM,Hypothyroidism,Hyperlipedema,Asthma. Patient follows with Endo Dr Rocael Reed but hasnt seen him since pandemic no other specialists     Health Maintenance     mammogram is due, cologuard        Christina Moon (:  1960) is a 63 y.o. female, here for establishing care. They report a PMH of:  Past Medical History:   Diagnosis Date    Asthma     Bronchitis     Diabetes mellitus ([address]     Psoriasis     Thyroid disease     Water retention        Social and family histories reviewed and updated as appropriate.    She was previously a patient of Dr. Haile Betts  She also follows with endocrinology and gyn (Dr. Marguerite Yeung)  Works a [employer] with niece     F/U of chronic problem(s) and new or recent complaint of yeast infection   Chronic problems reviewed today include:  DM, HTN, HLD, Hypothyroidism, vitamin D deficiency, and anxiety/depression  Current status of this/these condition(s):  stable  Tolerating meds: Yes    Diabetes Mellitus Type 2   Current treatment: metformin 1000 mg BID, glimepride 4 mg BID and lantus 10 units daily  Recent medication changes: out of glimepiride for a few weeks; lantus started in March  Last HbA1c: 11.3    Home blood sugar records: occasionally checks morning fasting, 200s-300s    Medication compliance:  compliant most of the time  Diabetic diet compliance:  compliant most of the time      Lab Results   Component Value Date    LABA1C 11.3 (H) 2020    LABA1C 9.4 (H) 03/10/2020    LABA1C 7.7 (H) 2019     Lab Results   Component Value Date    LABMICR <12.0 2019    CREATININE 0.6 2020     Lab Results   Component Value Date    ALT 28 2020    AST 25 2020     Lab Results   Component Value Date    CHOL 214 (H) 08/31/2020    TRIG 152 (H) 08/31/2020    HDL 56 08/31/2020    LDLCALC 128 (H) 08/31/2020        Hypertension  Current treatment: lisinopril 5 mg daily  Recent medication changes: lisinopril started in the past year  Patient denies chest pain, shortness of breath and headache. Antihypertensive medication side effects: no medication side effects noted. No CAD or CHF    BP Readings from Last 3 Encounters:   09/01/20 134/78   03/18/20 (!) 146/64   02/20/20 (!) 152/68                                          Sodium (mmol/L)   Date Value   08/31/2020 135    BUN (mg/dL)   Date Value   08/31/2020 14    Glucose (mg/dL)   Date Value   08/31/2020 374 (H)      Potassium (mmol/L)   Date Value   08/31/2020 4.9    CREATININE (mg/dL)   Date Value   08/31/2020 0.6         Hyperlipidemia  Current treatment: rosuvastatin 40 mg daily   Recent medication changes: ran out of meds recently  No new myalgias or GI upset when taking    Lab Results   Component Value Date    CHOL 214 (H) 08/31/2020    TRIG 152 (H) 08/31/2020    HDL 56 08/31/2020    LDLCALC 128 (H) 08/31/2020     Lab Results   Component Value Date    ALT 28 08/31/2020    AST 25 08/31/2020        Hypothyroidism  Diagnosed years prior  Current treatment: levothyroxine 200 mcg daily  Recent medication changes: none  Recent symptoms: none  Patient is  taking her medication consistently on an empty stomach. No results found for: [facility]  Lab Results   Component Value Date    TSH 0.492 03/10/2020    TSH 6.400 (H) 12/03/2019    TSH 1.740 07/25/2019     Asthma  Current treatment: breo and albuterol PRN  Recent medication changes: none  Quit smoking 30 years ago  Breathing is stable    Depression and anxiety   Current treatment: none  Recent medication changes: venlafaxine prescribed but not started  Symptoms are poorly controlled    Review of Systems   Constitutional: Negative for chills, fatigue and fever.    HENT: Negative for congestion, rhinorrhea, sinus pain and sore throat. Respiratory: Negative for cough and shortness of breath. Cardiovascular: Negative for chest pain, palpitations and leg swelling. Gastrointestinal: Negative for abdominal pain, constipation, diarrhea, nausea and vomiting. Endocrine: Positive for polydipsia. Negative for cold intolerance and heat intolerance. Genitourinary: Positive for frequency. Negative for difficulty urinating. Musculoskeletal: Negative for arthralgias, back pain and gait problem. Skin: Negative for rash. Allergic/Immunologic: Positive for environmental allergies. Neurological: Positive for dizziness. Negative for weakness and numbness. Hematological: Does not bruise/bleed easily. Psychiatric/Behavioral: Positive for dysphoric mood. The patient is nervous/anxious. Prior to Visit Medications    Medication Sig Taking?  Authorizing Provider   lisinopril (PRINIVIL;ZESTRIL) 5 MG tablet TAKE 1 TABLET BY MOUTH DAILY Yes Antoni Aguiar DO   Insulin Pen Needle 32G X 4 MM MISC 1 each by Does not apply route daily Yes Antoni Aguiar DO   Cholecalciferol 1.25 MG (72199 UT) TABS Take 1 tablet by mouth once a week Yes Antoni Aguiar DO   Fluticasone furoate-vilanterol (BREO ELLIPTA) 200-25 MCG/INH AEPB inhaler Inhale 1 puff into the lungs daily Yes Antoni Aguiar DO   levothyroxine (SYNTHROID) 200 MCG tablet Take 1 tablet by mouth daily Yes Ami Perdomo MD   glimepiride (AMARYL) 4 MG tablet Take 4 mg twice a day Yes Ami Perdomo MD   insulin glargine (BASAGLAR KWIKPEN) 100 UNIT/ML injection pen Inject 10 Units into the skin nightly Yes Historical Provider, MD   metFORMIN (GLUCOPHAGE) 1000 MG tablet Take 1 tablet by mouth 2 times daily (with meals) Yes Ami Perdomo MD   blood glucose monitor strips Check twice daily Yes Antoni Aguiar DO   Blood Glucose Monitoring Suppl (BLOOD GLUCOSE MONITOR SYSTEM) w/Device KIT Check blood glucose twice daily Yes Antoni Aguiar DO   Lancets MISC Check blood glucose twice daily Yes Antoni Aguiar DO   albuterol sulfate HFA (PROVENTIL HFA) 108 (90 Base) MCG/ACT inhaler Inhale 2 puffs into the lungs every 4 hours as needed for Wheezing or Shortness of Breath Yes Ashley Gonzalez DO   venlafaxine (EFFEXOR XR) 75 MG extended release capsule Take 1 capsule by mouth daily Begin after completing 37.5mg daily x 7 days  Patient not taking: Reported on 2020  Vernon Khalil DO   rosuvastatin (CRESTOR) 40 MG tablet Take 1 tablet by mouth daily  Patient not taking: Reported on 2020  Vernon Khalil DO   fluticasone (FLONASE) 50 MCG/ACT nasal spray 1 spray by Nasal route daily One spray each nostril daily  Antoni Aguiar DO   sodium chloride (OCEAN) 0.65 % nasal spray 1 spray by Nasal route as needed for Congestion  Antoni Aguiar DO        Allergies   Allergen Reactions    Pcn [Penicillins]        Past Surgical History:   Procedure Laterality Date    CHOLECYSTECTOMY      HYSTERECTOMY         Social History     Socioeconomic History    Marital status: Single     Spouse name: Not on file    Number of children: Not on file    Years of education: Not on file    Highest education level: Not on file   Occupational History     Employer: [employer]     Comment:  in [address] [place] [address] resource strain: Not on file    Food insecurity     Worry: Not on file     Inability: Not on file    Transportation needs     Medical: Not on file     Non-medical: Not on file   Tobacco Use    Smoking status: Former Smoker     Packs/day: 0.00     Years: 0.00     Pack years: 0.00     Last attempt to quit: 2018     Years since quittin.1    Smokeless tobacco: Never Used    Tobacco comment: quit    Substance and Sexual Activity    Alcohol use: No    Drug use: No    Sexual activity: Not on file   Lifestyle    Physical activity     Days per week: Not on file     Minutes per session: Not on file    Stress: Not on file   Relationships    Social connections     Talks on phone: Not on file     Gets together: Not on file     Attends Denominational service: Not on file     Active member of club or organization: Not on file     Attends meetings of clubs or organizations: Not on file     Relationship status: Not on file    Intimate partner violence     Fear of current or ex partner: Not on file     Emotionally abused: Not on file     Physically abused: Not on file     Forced sexual activity: Not on file   Other Topics Concern    Not on file   Social History Narrative    Not on file        Family History   Problem Relation Age of Onset    Breast Cancer Mother     Colon Cancer Mother 80    Stroke Father     Hypertension Brother        Vitals:    09/01/20 0841   BP: 134/78   Pulse: 63   Resp: 18   Temp: 96.9 °F (36.1 °C)   TempSrc: Temporal   SpO2: 97%   Weight: 287 lb (130.2 kg)   Height: 5' 4\" (1.626 m)     Estimated body mass index is 49.26 kg/m² as calculated from the following:    Height as of this encounter: 5' 4\" (1.626 m). Weight as of this encounter: 287 lb (130.2 kg). Physical Exam  Constitutional:       General: She is not in acute distress. Appearance: She is well-developed. She is obese. HENT:      Head: Normocephalic and atraumatic. Right Ear: External ear normal.      Left Ear: External ear normal.      Nose: Nose normal. No congestion or rhinorrhea. Eyes:      Extraocular Movements: Extraocular movements intact. Pupils: Pupils are equal, round, and reactive to light. Neck:      Musculoskeletal: Normal range of motion. Thyroid: No thyromegaly. Cardiovascular:      Rate and Rhythm: Normal rate and regular rhythm. Pulmonary:      Effort: Pulmonary effort is normal. No respiratory distress. Breath sounds: Normal breath sounds. No wheezing or rales. Abdominal:      General: There is no distension. Palpations: Abdomen is soft. Tenderness: There is no abdominal tenderness. Musculoskeletal: Normal range of motion. General: No swelling or deformity. Skin:     General: Skin is warm. Findings: No rash. Neurological:      General: No focal deficit present. Mental Status: She is alert. Mental status is at baseline. Psychiatric:         Mood and Affect: Mood is anxious and depressed. Behavior: Behavior normal.         ASSESSMENT/PLAN:  Christina was seen today for establish care and health maintenance. Diagnoses and all orders for this visit:    Encounter to establish care    Uncontrolled type 2 diabetes mellitus with hyperglycemia ([facility] [address] [address]  -     glimepiride (AMARYL) 4 MG tablet; Take 4 mg twice a day  -     metFORMIN (GLUCOPHAGE) 1000 MG tablet; Take 1 tablet by mouth 2 times daily (with meals)  -     insulin glargine (BASAGLAR KWIKPEN) 100 UNIT/ML injection pen; Inject 20 Units into the skin nightly    Essential hypertension    Dyslipidemia associated with type 2 diabetes mellitus (HCC)  -     rosuvastatin (CRESTOR) 40 MG tablet; Take 1 tablet by mouth daily    Vitamin D deficiency  -     Cholecalciferol 1.25 MG (45208 UT) TABS; Take 1 tablet by mouth once a week    Breast cancer screening  -     LILIAN DIGITAL SCREEN BILATERAL PER PROTOCOL; Future    Encounter for screening for malignant neoplasm of colon  -     Cologuard (For External Results Only); Future    Other orders  -     fluconazole (DIFLUCAN) 150 MG tablet; Take 1 tablet by mouth once for 1 dose May repeat in 3-5 days, if symptoms persist or recur. Additional plan and future considerations:   As above. EMR reviewed. Increase Lantus to 20 units daily and encourage compliance with glucose monitoring. Restart rosuvastatin. To follow-up with endocrinology. Encourage starting previously prescribed venlafaxine.   Return to office in 3 months for diabetes, hypertension, hyperlipidemia, and anxiety depression follow-up or sooner if needed    Educational materials and/or home exercises printed for patient's review and were included in patient instructions on his/her After Visit Summary and given to patient at the end of visit. Counseled regarding above diagnosis, including possible risks and complications,  especially if left uncontrolled. Counseled regarding the possible side effects, risks, benefits and alternatives to treatment; patient and/or guardian verbalizes understanding, agrees, feels comfortable with and wishes to proceed with above treatment plan. Advised patient to call with any new medication issues, and read all Rx info from pharmacy to assure aware of all possible risks and side effects of medication before taking. Reviewed age and gender appropriate health screening exams and vaccinations. Advised patient regarding importance of keeping up with recommended health maintenance and to schedule as soon as possible if overdue, as this is important in assessing for undiagnosed pathology,especially cancer, as well as protecting against potentially harmful/life threatening disease. Patient and/or guardian verbalizes understanding and agrees with above counseling, assessment and plan. All questions answered. No future appointments.       --John Chicas,  on 9/1/2020 at 8:56 AM

## 2020-09-21 RX ORDER — LISINOPRIL 5 MG/1
5 TABLET ORAL DAILY
Qty: 30 TABLET | Refills: 1 | OUTPATIENT
Start: 2020-09-21

## 2020-10-27 ENCOUNTER — HOSPITAL ENCOUNTER (OUTPATIENT)
Dept: MAMMOGRAPHY | Age: 60
Discharge: HOME | End: 2020-10-29
Payer: COMMERCIAL

## 2020-11-18 ENCOUNTER — HOSPITAL ENCOUNTER (EMERGENCY)
Age: 60
Discharge: HOME | End: 2020-11-18
Payer: COMMERCIAL

## 2020-11-18 VITALS
DIASTOLIC BLOOD PRESSURE: 80 MMHG | TEMPERATURE: 97.5 F | HEIGHT: 60 IN | OXYGEN SATURATION: 99 % | SYSTOLIC BLOOD PRESSURE: 170 MMHG | HEART RATE: 72 BPM | WEIGHT: 289 LBS | BODY MASS INDEX: 56.74 KG/M2 | RESPIRATION RATE: 20 BRPM

## 2020-11-18 PROCEDURE — 99212 OFFICE O/P EST SF 10 MIN: CPT

## 2020-11-18 RX ORDER — GREEN TEA/HOODIA GORDONII 315-12.5MG
1 CAPSULE ORAL DAILY
Qty: 30 TABLET | Refills: 0 | Status: SHIPPED | OUTPATIENT
Start: 2020-11-18 | End: 2020-12-18

## 2020-11-18 RX ORDER — CLINDAMYCIN HYDROCHLORIDE 300 MG/1
300 CAPSULE ORAL 4 TIMES DAILY
Qty: 40 CAPSULE | Refills: 0 | Status: SHIPPED | OUTPATIENT
Start: 2020-11-18 | End: 2020-11-28

## 2020-11-18 ASSESSMENT — PAIN SCALES - GENERAL: PAINLEVEL_OUTOF10: 8

## 2020-11-18 ASSESSMENT — PAIN - FUNCTIONAL ASSESSMENT
PAIN_FUNCTIONAL_ASSESSMENT: ACTIVITIES ARE NOT PREVENTED
PAIN_FUNCTIONAL_ASSESSMENT: ACTIVITIES ARE NOT PREVENTED
PAIN_FUNCTIONAL_ASSESSMENT: 0-10

## 2020-11-18 ASSESSMENT — PAIN DESCRIPTION - ONSET
ONSET: ON-GOING
ONSET: ON-GOING

## 2020-11-18 ASSESSMENT — PAIN DESCRIPTION - PAIN TYPE
TYPE: ACUTE PAIN
TYPE: ACUTE PAIN

## 2020-11-18 ASSESSMENT — PAIN DESCRIPTION - FREQUENCY
FREQUENCY: CONTINUOUS
FREQUENCY: CONTINUOUS

## 2020-11-18 ASSESSMENT — PAIN DESCRIPTION - LOCATION
LOCATION: ANKLE;LEG
LOCATION: LEG

## 2020-11-18 ASSESSMENT — PAIN DESCRIPTION - DESCRIPTORS
DESCRIPTORS: THROBBING
DESCRIPTORS: THROBBING

## 2020-11-18 ASSESSMENT — PAIN DESCRIPTION - PROGRESSION
CLINICAL_PROGRESSION: GRADUALLY WORSENING
CLINICAL_PROGRESSION: NOT CHANGED

## 2020-11-18 ASSESSMENT — PAIN DESCRIPTION - ORIENTATION
ORIENTATION: RIGHT;LEFT
ORIENTATION: RIGHT;LEFT

## 2020-11-18 NOTE — ED PROVIDER NOTES
Department of Emergency Medicine  [address] [place] [address]  Provider Note  Admit Date/Time: 11/18/2020  2:05 PM  Room: 05/05  MRN: [id number]  Chief Complaint: Wound Check (BLE's/  Pt States \"I'm a diabetic \"  Pt \"Denies seeing anybody to this point for either Leg wound\" )       History of Present Illness   Source of history provided by:  patient. History/Exam Limitations: none. Jada Dumont is a 63 y.o. female who has a past medical history of:   Past Medical History:   Diagnosis Date    Asthma     Bronchitis     Diabetes mellitus ([address]     Psoriasis     Thyroid disease     Water retention     presents to the urgent care center by private car for redness to her left lower leg and small pinpoint open areas on both lower legs. This has  been going on for about a week. She states she had problems before with cellulitis. She has chronic edema in her legs but that this edema is worse than it was before. She does not have a fever does not have calf pain does not have chest pain or shortness of breath. ROS    Pertinent positives and negatives are stated within HPI, all other systems reviewed and are negative. Past Surgical History:   Procedure Laterality Date    CHOLECYSTECTOMY      HYSTERECTOMY, TOTAL ABDOMINAL  1995   Social History:  reports that she quit smoking about 2 years ago. She smoked 0.00 packs per day for 0.00 years. She has never used smokeless tobacco. She reports that she does not drink alcohol or use drugs. Family History: family history includes Breast Cancer in her mother; Colon Cancer (age of onset: 80) in her mother; Hypertension in her brother; Stroke in her father.   Allergies: Pcn [penicillins]    Physical Exam   Oxygen Saturation Interpretation: Normal.   ED Triage Vitals [11/18/20 1406]   BP Temp Temp Source Pulse Resp SpO2 Height Weight   (!) 170/80 97.5 °F (36.4 °C) Temporal 72 20 99 % 5' (1.524 m) 289 lb (131.1 kg)       Physical Exam  · Constitutional/General: Alert and oriented x3, well appearing, non toxic in NAD  · HEENT:  NC/NT. Clear conjunctiva,  Airway patent. · Neck: Supple, full ROM,   · Respiratory: Lungs clear to auscultation bilaterally, no wheezes, rales, or rhonchi. Not in respiratory distress  · CV:  Regular rate. Regular rhythm. She has 3+ edema of bilateral lower extremities  · Musculoskeletal: Moves all extremities x 4.  · Integument: skin warm and dry. No rashes. Left lower extremity has erythema from the knee down to the ankle. She has some chronic from discoloration of her bilateral lower extremities. She has tiny pinpoint open areas that are seeping serous drainage bilaterally. Normal color warmth and sensation present to the feet and toes  · Neurologic: GCS 15, no focal deficits, symmetric strength 5/5 in the upper and lower extremities bilaterally  · Psychiatric: Normal Affect    Lab / Imaging Results   (All laboratory and radiology results have been personally reviewed by myself)  Labs:  No results found for this visit on 11/18/20. Imaging: All Radiology results interpreted by Radiologist unless otherwise noted. US DUP LOWER EXTREMITIES BILATERAL VENOUS   Final Result   No evidence of DVT in either lower extremity. ED Course / Medical Decision Making   Medications - No data to display       MDM:     I did an ultrasound due to the edema and it showed  no evidence of a DVT. We will treat for cellulitis I did put her on clindamycin I advised her she needs to see her doctor tomorrow for recheck and if anything worsens with this she needs to go to the emergency department. Counseling:    I have  spoken with the patient and discussed todays results, in addition to providing specific details for the plan of care and counseling regarding the diagnosis and prognosis. Questions are answered at this time and they are agreeable with the plan. Assessment      1.  Cellulitis, unspecified cellulitis site Plan   Discharge to home and advised to contact Jazmyne Law  [address] [facility] [address]  [phone]    Schedule an appointment as soon as possible for a visit in 1 day     Patient condition is good    New Medications     New Prescriptions    CLINDAMYCIN (CLEOCIN) 300 MG CAPSULE    Take 1 capsule by mouth 4 times daily for 10 days If not covered by insurance or too costly for patient may substitute clindamycin  mg Caps, 2 caps TID ,QS for 10 days. PROBIOTIC ACIDOPHILUS (FLORANEX) TABS    Take 1 tablet by mouth daily Take while  You are on the antibiotic     Electronically signed by EMY Cowart CNP   DD: 11/18/20  **This report was transcribed using voice recognition software. Every effort was made to ensure accuracy; however, inadvertent computerized transcription errors may be present.   END OF ED PROVIDER NOTE     EMY Cowart CNP  11/18/20 3536

## 2020-12-02 ENCOUNTER — TELEPHONE (OUTPATIENT)
Dept: ADMINISTRATIVE | Age: 60
End: 2020-12-02

## 2020-12-18 ENCOUNTER — TELEPHONE (OUTPATIENT)
Dept: FAMILY MEDICINE CLINIC | Age: 60
End: 2020-12-18

## 2020-12-31 RX ORDER — VENLAFAXINE HYDROCHLORIDE 75 MG/1
75 CAPSULE, EXTENDED RELEASE ORAL DAILY
Qty: 7 CAPSULE | Refills: 0 | Status: SHIPPED
Start: 2020-12-31 | End: 2021-01-05 | Stop reason: SDUPTHER

## 2020-12-31 RX ORDER — LISINOPRIL 5 MG/1
5 TABLET ORAL DAILY
Qty: 7 TABLET | Refills: 0 | Status: SHIPPED
Start: 2020-12-31 | End: 2021-01-05 | Stop reason: SDUPTHER

## 2021-01-05 ENCOUNTER — OFFICE VISIT (OUTPATIENT)
Dept: FAMILY MEDICINE CLINIC | Age: 61
End: 2021-01-05
Payer: COMMERCIAL

## 2021-01-05 VITALS
BODY MASS INDEX: 50.02 KG/M2 | OXYGEN SATURATION: 97 % | TEMPERATURE: 96.7 F | SYSTOLIC BLOOD PRESSURE: 136 MMHG | DIASTOLIC BLOOD PRESSURE: 76 MMHG | HEIGHT: 64 IN | WEIGHT: 293 LBS | HEART RATE: 73 BPM

## 2021-01-05 DIAGNOSIS — I10 ESSENTIAL HYPERTENSION: ICD-10-CM

## 2021-01-05 DIAGNOSIS — Z12.31 ENCOUNTER FOR SCREENING MAMMOGRAM FOR MALIGNANT NEOPLASM OF BREAST: ICD-10-CM

## 2021-01-05 DIAGNOSIS — F41.1 GAD (GENERALIZED ANXIETY DISORDER): ICD-10-CM

## 2021-01-05 DIAGNOSIS — E03.9 ACQUIRED HYPOTHYROIDISM: Chronic | ICD-10-CM

## 2021-01-05 DIAGNOSIS — E11.65 UNCONTROLLED TYPE 2 DIABETES MELLITUS WITH HYPERGLYCEMIA (HCC): Primary | Chronic | ICD-10-CM

## 2021-01-05 DIAGNOSIS — E78.5 DYSLIPIDEMIA: Chronic | ICD-10-CM

## 2021-01-05 LAB — HBA1C MFR BLD: 9.9 %

## 2021-01-05 PROCEDURE — 83036 HEMOGLOBIN GLYCOSYLATED A1C: CPT | Performed by: FAMILY MEDICINE

## 2021-01-05 PROCEDURE — 90686 IIV4 VACC NO PRSV 0.5 ML IM: CPT | Performed by: FAMILY MEDICINE

## 2021-01-05 PROCEDURE — 90471 IMMUNIZATION ADMIN: CPT | Performed by: FAMILY MEDICINE

## 2021-01-05 PROCEDURE — 99214 OFFICE O/P EST MOD 30 MIN: CPT | Performed by: FAMILY MEDICINE

## 2021-01-05 RX ORDER — VENLAFAXINE HYDROCHLORIDE 75 MG/1
75 CAPSULE, EXTENDED RELEASE ORAL DAILY
Qty: 90 CAPSULE | Refills: 0 | Status: SHIPPED
Start: 2021-01-05 | End: 2021-04-08 | Stop reason: SDUPTHER

## 2021-01-05 RX ORDER — LISINOPRIL 5 MG/1
5 TABLET ORAL DAILY
Qty: 90 TABLET | Refills: 0 | Status: SHIPPED
Start: 2021-01-05 | End: 2021-04-08 | Stop reason: SDUPTHER

## 2021-01-05 NOTE — PROGRESS NOTES
Vaccine Information Sheet, \"Influenza - Inactivated\"  given to Joel Carrascoer, or parent/legal guardian of  Joel Jolly and verbalized understanding. Patient responses:    Have you ever had a reaction to a flu vaccine? No  Do you have any current illness? No  Have you ever had [facility] [place] Syndrome? No  Do you have a serious allergy to any of the follow: Neomycin, Polymyxin, Thimerosal, eggs or egg products? No    Flu vaccine given per order. Please see immunization tab. Risks and benefits explained. Current VIS given.       Immunizations Administered     Name Date Dose Route    Influenza, Quadv, IM, PF (6 mo and older Fluzone, Flulaval, Fluarix, and 3 yrs and older Afluria) 1/5/2021 0.5 mL Intramuscular    Site: Deltoid- Right    Lot: [id number]    NDC: 73573-061-04

## 2021-01-05 NOTE — PROGRESS NOTES
2021     Christina Reid (:  1960) is a 62 y.o. female, with a:  Past Medical History:   Diagnosis Date    Asthma     Bronchitis     Diabetes mellitus ([address]     Psoriasis     Thyroid disease     Water retention        Here for evaluation of the following medical concerns:  Chief Complaint   Patient presents with    3 Month Follow-Up    Diabetes     follows with Dr Jaison Barrett Hypertension    Health Maintenance     patient has cologuard but didnt complete yet, patient no showed for [facility] 10/27/2020 but is willing to reschedule     Edema     right leg edema x 2 days     She also follows with endocrinology and gyn     Chronic problems reviewed today include:  DM, HTN, HLD, Hypothyroidism, vitamin D deficiency, and anxiety/depression  Current status of this/these condition(s):  stable  Tolerating meds:         Yes    Diabetes Mellitus Type 2   Current treatment: metformin 1000 mg BID, glimepride 4 mg BID and lantus 20 units daily  Recent medication changes: lantus increased  Last HbA1c: 9.9      Lab Results   Component Value Date    LABA1C 9.9 2021    LABA1C 11.3 (H) 2020    LABA1C 9.4 (H) 03/10/2020     Lab Results   Component Value Date    LABMICR <12.0 2019    CREATININE 0.6 2020     Lab Results   Component Value Date    ALT 28 2020    AST 25 2020     Lab Results   Component Value Date    CHOL 214 (H) 2020    TRIG 152 (H) 2020    HDL 56 2020    LDLCALC 128 (H) 2020        Hypertension  Current treatment: lisinopril 5 mg daily  Recent medication changes: none  Patient denies chest pain, shortness of breath and headache. Antihypertensive medication side effects: no medication side effects noted.       BP Readings from Last 3 Encounters:   21 136/76   20 (!) 170/80   20 134/78                                          Sodium (mmol/L)   Date Value   2020 135    BUN (mg/dL)   Date Value   2020 14 Glucose (mg/dL)   Date Value   08/31/2020 374 (H)      Potassium (mmol/L)   Date Value   08/31/2020 4.9    CREATININE (mg/dL)   Date Value   08/31/2020 0.6         Hyperlipidemia  Current treatment: rosuvastatin 40 mg daily   Recent medication changes: rosuvastatin restarted  No new myalgias or GI upset when taking    Lab Results   Component Value Date    CHOL 214 (H) 08/31/2020    TRIG 152 (H) 08/31/2020    HDL 56 08/31/2020    LDLCALC 128 (H) 08/31/2020     Lab Results   Component Value Date    ALT 28 08/31/2020    AST 25 08/31/2020        Hypothyroidism  Diagnosed years prior  Current treatment: levothyroxine 200 mcg daily  Recent medication changes: none  Recent symptoms: none    No results found for: [facility]  Lab Results   Component Value Date    TSH 0.492 03/10/2020    TSH 6.400 (H) 12/03/2019    TSH 1.740 07/25/2019     Asthma  Current treatment: breo and albuterol PRN  Recent medication changes: none  Quit smoking 30 years ago  Breathing is stable     Depression and anxiety   Current treatment: venlafaxine 75 mg daily  Recent medication changes: venlafaxine started  Symptoms are significantly improved    Review of Systems   Constitutional: Negative for chills, fatigue and fever. Respiratory: Negative for cough, shortness of breath and wheezing. Cardiovascular: Negative for chest pain and palpitations. Gastrointestinal: Negative for constipation, diarrhea, nausea and vomiting. Genitourinary: Negative for difficulty urinating and dysuria. Neurological: Negative for headaches. Prior to Visit Medications    Medication Sig Taking?  Authorizing Provider   venlafaxine (EFFEXOR XR) 75 MG extended release capsule Take 1 capsule by mouth daily Yes Joe Becker, DO   lisinopril (PRINIVIL;ZESTRIL) 5 MG tablet Take 1 tablet by mouth daily Yes Joe Becker,    glimepiride (AMARYL) 4 MG tablet Take 4 mg twice a day Yes Joe Becker, DO   metFORMIN (GLUCOPHAGE) 1000 MG tablet Take 1 tablet by mouth 2 times daily (with meals) Yes Joe Becker DO   Cholecalciferol 1.25 MG (26689 UT) TABS Take 1 tablet by mouth once a week Yes Joe Becker DO   insulin glargine (BASAGLAR KWIKPEN) 100 UNIT/ML injection pen Inject 20 Units into the skin nightly Yes Joe Becker DO   rosuvastatin (CRESTOR) 40 MG tablet Take 1 tablet by mouth daily Yes Joe Becker DO   Insulin Pen Needle 32G X 4 MM MISC 1 each by Does not apply route daily Yes Antoni Aguiar DO   Fluticasone furoate-vilanterol (BREO ELLIPTA) 200-25 MCG/INH AEPB inhaler Inhale 1 puff into the lungs daily Yes Antoni Aguiar DO   levothyroxine (SYNTHROID) 200 MCG tablet Take 1 tablet by mouth daily Yes Ankita Hitchcock MD   blood glucose monitor strips Check twice daily Yes Antoni Aguiar DO   Blood Glucose Monitoring Suppl (BLOOD GLUCOSE MONITOR SYSTEM) w/Device KIT Check blood glucose twice daily Yes Antoni Aguiar DO   Lancets MISC Check blood glucose twice daily Yes Antoni Aguiar DO   albuterol sulfate HFA (PROVENTIL HFA) 108 (90 Base) MCG/ACT inhaler Inhale 2 puffs into the lungs every 4 hours as needed for Wheezing or Shortness of Breath Yes Calista Caballero DO        Social History     Tobacco Use    Smoking status: Former Smoker     Packs/day: 0.00     Years: 0.00     Pack years: 0.00     Quit date: 2018     Years since quittin.4    Smokeless tobacco: Never Used    Tobacco comment: quit    Substance Use Topics    Alcohol use: No        Past Surgical History:   Procedure Laterality Date    CHOLECYSTECTOMY      HYSTERECTOMY, TOTAL ABDOMINAL         Vitals:    21 1034   BP: 136/76   Pulse: 73   Temp: 96.7 °F (35.9 °C)   TempSrc: Temporal   SpO2: 97%   Weight: 294 lb (133.4 kg)   Height: 5' 4\" (1.626 m)     Estimated body mass index is 50.46 kg/m² as calculated from the following:    Height as of this encounter: 5' 4\" (1.626 m). Weight as of this encounter: 294 lb (133.4 kg).     Physical Exam  Constitutional:       General: She is not in acute distress. Appearance: Normal appearance. She is obese. She is not ill-appearing. HENT:      Head: Normocephalic and atraumatic. Eyes:      Extraocular Movements: Extraocular movements intact. Conjunctiva/sclera: Conjunctivae normal.   Cardiovascular:      Rate and Rhythm: Normal rate and regular rhythm. Heart sounds: No murmur. Pulmonary:      Effort: Pulmonary effort is normal. No respiratory distress. Breath sounds: No wheezing. Abdominal:      General: There is no distension. Musculoskeletal:      Right lower leg: No edema. Left lower leg: No edema. Skin:     General: Skin is warm and dry. Neurological:      General: No focal deficit present. Mental Status: She is alert. Mental status is at baseline. Psychiatric:         Behavior: Behavior normal.         ASSESSMENT/PLAN:  Christina was seen today for 3 month follow-up, diabetes, hypertension, health maintenance and edema. Diagnoses and all orders for this visit:    Uncontrolled type 2 diabetes mellitus with hyperglycemia (HCC)  -     POCT glycosylated hemoglobin (Hb A1C)  -     HEMOGLOBIN A1C; Future  -     Comprehensive Metabolic Panel; Future  -     CBC Auto Differential; Future  -     Microalbumin / Creatinine Urine Ratio; Future    Essential hypertension  -     lisinopril (PRINIVIL;ZESTRIL) 5 MG tablet; Take 1 tablet by mouth daily  -     Comprehensive Metabolic Panel; Future  -     CBC Auto Differential; Future    Dyslipidemia  -     LIPID PANEL; Future  -     Comprehensive Metabolic Panel; Future  -     CBC Auto Differential; Future    Acquired hypothyroidism  -     TSH; Future  -     Comprehensive Metabolic Panel; Future  -     CBC Auto Differential; Future    MARIELY (generalized anxiety disorder)  -     venlafaxine (EFFEXOR XR) 75 MG extended release capsule;  Take 1 capsule by mouth daily    Encounter for screening mammogram for malignant neoplasm of breast  -     LILIAN DIGITAL SCREEN BILATERAL PER PROTOCOL; Future    Other orders  -     INFLUENZA, QUADV, 3 YRS AND OLDER, IM PF, PREFILL SYR OR SDV, 0.5ML (AFLURIA QUADV, PF)    Additional plan and future considerations:   As above. RTO in 3 months with labs drawn 1 week prior for diabetes, dyslipidemia, HTN, hypothyroidism follow up or sooner if needed.     Future Appointments   Date Time Provider \[facility] [place] [facility]\""   4/8/2021  8:45 AM Ruthann Lozoya DO [facility]         --Ruthann Lozoya DO on 1/5/2021 at 10:53 AM

## 2021-01-06 ASSESSMENT — ENCOUNTER SYMPTOMS
WHEEZING: 0
NAUSEA: 0
VOMITING: 0
DIARRHEA: 0
SHORTNESS OF BREATH: 0
CONSTIPATION: 0
COUGH: 0

## 2021-03-01 ENCOUNTER — PATIENT MESSAGE (OUTPATIENT)
Dept: FAMILY MEDICINE CLINIC | Age: 61
End: 2021-03-01

## 2021-03-01 DIAGNOSIS — J45.41 MODERATE PERSISTENT ASTHMA WITH ACUTE EXACERBATION: ICD-10-CM

## 2021-03-01 NOTE — TELEPHONE ENCOUNTER
From: Joel Jolly  To:  Tisha Raphael DO  Sent: 3/1/2021 5:55 AM EST  Subject: Prescription Question    I need another Breo please

## 2021-03-11 ENCOUNTER — HOSPITAL ENCOUNTER (OUTPATIENT)
Dept: MAMMOGRAPHY | Age: 61
Discharge: HOME | End: 2021-03-13
Payer: COMMERCIAL

## 2021-03-11 DIAGNOSIS — Z12.31 ENCOUNTER FOR SCREENING MAMMOGRAM FOR MALIGNANT NEOPLASM OF BREAST: ICD-10-CM

## 2021-03-11 PROCEDURE — 77063 BREAST TOMOSYNTHESIS BI: CPT

## 2021-03-25 ENCOUNTER — IMMUNIZATION (OUTPATIENT)
Dept: PRIMARY CARE CLINIC | Age: 61
End: 2021-03-25
Payer: COMMERCIAL

## 2021-03-25 PROCEDURE — 0011A COVID-19, MODERNA VACCINE 100MCG/0.5ML DOSE: CPT | Performed by: NURSE PRACTITIONER

## 2021-03-25 PROCEDURE — 91301 COVID-19, MODERNA VACCINE 100MCG/0.5ML DOSE: CPT | Performed by: NURSE PRACTITIONER

## 2021-04-01 DIAGNOSIS — E11.65 UNCONTROLLED TYPE 2 DIABETES MELLITUS WITH HYPERGLYCEMIA (HCC): Chronic | ICD-10-CM

## 2021-04-01 DIAGNOSIS — E78.5 DYSLIPIDEMIA: Chronic | ICD-10-CM

## 2021-04-01 DIAGNOSIS — E03.9 ACQUIRED HYPOTHYROIDISM: Chronic | ICD-10-CM

## 2021-04-01 DIAGNOSIS — I10 ESSENTIAL HYPERTENSION: ICD-10-CM

## 2021-04-01 LAB
ALBUMIN SERPL-MCNC: 4.1 G/DL (ref 3.5–5.2)
ALP BLD-CCNC: 186 U/L (ref 35–104)
ALT SERPL-CCNC: 30 U/L (ref 0–32)
ANION GAP SERPL CALCULATED.3IONS-SCNC: 14 MMOL/L (ref 7–16)
AST SERPL-CCNC: 26 U/L (ref 0–31)
BASOPHILS ABSOLUTE: 0.06 E9/L (ref 0–0.2)
BASOPHILS RELATIVE PERCENT: 0.8 % (ref 0–2)
BILIRUB SERPL-MCNC: 0.5 MG/DL (ref 0–1.2)
BUN BLDV-MCNC: 18 MG/DL (ref 6–20)
CALCIUM SERPL-MCNC: 8.9 MG/DL (ref 8.6–10.2)
CHLORIDE BLD-SCNC: 103 MMOL/L (ref 98–107)
CHOLESTEROL, TOTAL: 215 MG/DL (ref 0–199)
CO2: 23 MMOL/L (ref 22–29)
CREAT SERPL-MCNC: 0.7 MG/DL (ref 0.5–1)
CREATININE URINE: 113 MG/DL (ref 29–226)
EOSINOPHILS ABSOLUTE: 0.3 E9/L (ref 0.05–0.5)
EOSINOPHILS RELATIVE PERCENT: 4.1 % (ref 0–6)
GFR AFRICAN AMERICAN: >60
GFR NON-AFRICAN AMERICAN: >60 ML/MIN/1.73
GLUCOSE BLD-MCNC: 222 MG/DL (ref 74–99)
HBA1C MFR BLD: 10.3 % (ref 4–5.6)
HCT VFR BLD CALC: 38.6 % (ref 34–48)
HDLC SERPL-MCNC: 57 MG/DL
HEMOGLOBIN: 12.5 G/DL (ref 11.5–15.5)
IMMATURE GRANULOCYTES #: 0.01 E9/L
IMMATURE GRANULOCYTES %: 0.1 % (ref 0–5)
LDL CHOLESTEROL CALCULATED: 138 MG/DL (ref 0–99)
LYMPHOCYTES ABSOLUTE: 2.03 E9/L (ref 1.5–4)
LYMPHOCYTES RELATIVE PERCENT: 27.5 % (ref 20–42)
MCH RBC QN AUTO: 29.3 PG (ref 26–35)
MCHC RBC AUTO-ENTMCNC: 32.4 % (ref 32–34.5)
MCV RBC AUTO: 90.4 FL (ref 80–99.9)
MICROALBUMIN UR-MCNC: 28.4 MG/L
MICROALBUMIN/CREAT UR-RTO: 25.1 (ref 0–30)
MONOCYTES ABSOLUTE: 0.6 E9/L (ref 0.1–0.95)
MONOCYTES RELATIVE PERCENT: 8.1 % (ref 2–12)
NEUTROPHILS ABSOLUTE: 4.38 E9/L (ref 1.8–7.3)
NEUTROPHILS RELATIVE PERCENT: 59.4 % (ref 43–80)
PDW BLD-RTO: 13.1 FL (ref 11.5–15)
PLATELET # BLD: 287 E9/L (ref 130–450)
PMV BLD AUTO: 9.3 FL (ref 7–12)
POTASSIUM SERPL-SCNC: 4.8 MMOL/L (ref 3.5–5)
RBC # BLD: 4.27 E12/L (ref 3.5–5.5)
SODIUM BLD-SCNC: 140 MMOL/L (ref 132–146)
TOTAL PROTEIN: 7.2 G/DL (ref 6.4–8.3)
TRIGL SERPL-MCNC: 98 MG/DL (ref 0–149)
TSH SERPL DL<=0.05 MIU/L-ACNC: 1.38 UIU/ML (ref 0.27–4.2)
VLDLC SERPL CALC-MCNC: 20 MG/DL
WBC # BLD: 7.4 E9/L (ref 4.5–11.5)

## 2021-04-08 ENCOUNTER — OFFICE VISIT (OUTPATIENT)
Dept: FAMILY MEDICINE CLINIC | Age: 61
End: 2021-04-08
Payer: COMMERCIAL

## 2021-04-08 VITALS
WEIGHT: 293 LBS | RESPIRATION RATE: 18 BRPM | HEART RATE: 61 BPM | TEMPERATURE: 97.6 F | HEIGHT: 64 IN | SYSTOLIC BLOOD PRESSURE: 144 MMHG | DIASTOLIC BLOOD PRESSURE: 80 MMHG | OXYGEN SATURATION: 98 % | BODY MASS INDEX: 50.02 KG/M2

## 2021-04-08 DIAGNOSIS — E03.9 ACQUIRED HYPOTHYROIDISM: ICD-10-CM

## 2021-04-08 DIAGNOSIS — Z79.4 TYPE 2 DIABETES MELLITUS WITH HYPERGLYCEMIA, WITH LONG-TERM CURRENT USE OF INSULIN (HCC): Primary | ICD-10-CM

## 2021-04-08 DIAGNOSIS — I15.2 HYPERTENSION ASSOCIATED WITH DIABETES (HCC): ICD-10-CM

## 2021-04-08 DIAGNOSIS — E11.65 TYPE 2 DIABETES MELLITUS WITH HYPERGLYCEMIA, WITH LONG-TERM CURRENT USE OF INSULIN (HCC): Primary | ICD-10-CM

## 2021-04-08 DIAGNOSIS — E11.59 HYPERTENSION ASSOCIATED WITH DIABETES (HCC): ICD-10-CM

## 2021-04-08 DIAGNOSIS — E11.69 DYSLIPIDEMIA ASSOCIATED WITH TYPE 2 DIABETES MELLITUS (HCC): ICD-10-CM

## 2021-04-08 DIAGNOSIS — E78.5 DYSLIPIDEMIA ASSOCIATED WITH TYPE 2 DIABETES MELLITUS (HCC): ICD-10-CM

## 2021-04-08 DIAGNOSIS — E55.9 VITAMIN D DEFICIENCY: ICD-10-CM

## 2021-04-08 DIAGNOSIS — F41.1 GAD (GENERALIZED ANXIETY DISORDER): ICD-10-CM

## 2021-04-08 PROBLEM — R50.9 FEVER: Status: RESOLVED | Noted: 2018-03-06 | Resolved: 2021-04-08

## 2021-04-08 PROCEDURE — 99214 OFFICE O/P EST MOD 30 MIN: CPT | Performed by: FAMILY MEDICINE

## 2021-04-08 RX ORDER — LISINOPRIL 10 MG/1
10 TABLET ORAL DAILY
Qty: 90 TABLET | Refills: 0 | Status: SHIPPED
Start: 2021-04-08 | End: 2021-07-08 | Stop reason: SDUPTHER

## 2021-04-08 RX ORDER — VENLAFAXINE HYDROCHLORIDE 75 MG/1
75 CAPSULE, EXTENDED RELEASE ORAL DAILY
Qty: 90 CAPSULE | Refills: 1 | Status: SHIPPED
Start: 2021-04-08 | End: 2021-11-29 | Stop reason: SDUPTHER

## 2021-04-08 RX ORDER — ROSUVASTATIN CALCIUM 40 MG/1
40 TABLET, COATED ORAL DAILY
Qty: 90 TABLET | Refills: 1 | Status: SHIPPED | OUTPATIENT
Start: 2021-04-08

## 2021-04-08 RX ORDER — GLIMEPIRIDE 4 MG/1
TABLET ORAL
Qty: 180 TABLET | Refills: 1 | Status: SHIPPED
Start: 2021-04-08 | End: 2022-05-12 | Stop reason: SDUPTHER

## 2021-04-08 RX ORDER — INSULIN GLARGINE 100 [IU]/ML
30 INJECTION, SOLUTION SUBCUTANEOUS NIGHTLY
Qty: 5 PEN | Refills: 5 | Status: SHIPPED
Start: 2021-04-08 | End: 2021-06-07 | Stop reason: SDUPTHER

## 2021-04-08 ASSESSMENT — ENCOUNTER SYMPTOMS
VOMITING: 0
DIARRHEA: 0
WHEEZING: 0
COUGH: 0
CONSTIPATION: 0
NAUSEA: 0
SHORTNESS OF BREATH: 0

## 2021-04-08 ASSESSMENT — PATIENT HEALTH QUESTIONNAIRE - PHQ9
1. LITTLE INTEREST OR PLEASURE IN DOING THINGS: 0
SUM OF ALL RESPONSES TO PHQ QUESTIONS 1-9: 0
SUM OF ALL RESPONSES TO PHQ QUESTIONS 1-9: 0

## 2021-04-08 NOTE — PATIENT INSTRUCTIONS

## 2021-04-08 NOTE — PROGRESS NOTES
2021     Christina Diaz (:  1960) is a 62 y.o. female, with a:  Past Medical History:   Diagnosis Date    Asthma     Bronchitis     Diabetes mellitus ([address]     Psoriasis     Thyroid disease     Water retention        Here for evaluation of the following medical concerns:  Chief Complaint   Patient presents with    3 Month Follow-Up    Discuss Labs     completed 2021    Health Maintenance     mammogram completed 3/11/2021. Patient didnt completed cologuard yet      She also follows with endocrinology and gyn     Chronic problems reviewed today include:  DM, HTN, HLD, Hypothyroidism, vitamin D deficiency, and anxiety/depression  Current status of this/these condition(s):  stable  Tolerating meds:         Yes    Diabetes Mellitus Type 2   Current treatment: metformin 1000 mg BID, glimepride 4 mg BID and lantus 20 units daily  Recent medication changes: lantus increased  Last HbA1c: 10.3  Checking glucose 1-2 times per day; 220s-250s    Lab Results   Component Value Date    LABA1C 10.3 (H) 2021    LABA1C 9.9 2021    LABA1C 11.3 (H) 2020     Lab Results   Component Value Date    LABMICR 28.4 (H) 2021    CREATININE 0.7 2021     Lab Results   Component Value Date    ALT 30 2021    AST 26 2021     Lab Results   Component Value Date    CHOL 215 (H) 2021    TRIG 98 2021    HDL 57 2021    LDLCALC 138 (H) 2021        Hypertension  Current treatment: lisinopril 5 mg daily  Recent medication changes: none  Patient denies chest pain, shortness of breath and headache. Antihypertensive medication side effects: no medication side effects noted.       BP Readings from Last 3 Encounters:   21 (!) 144/80   21 136/76   20 (!) 170/80                                          Sodium (mmol/L)   Date Value   2021 140    BUN (mg/dL)   Date Value   2021 18    Glucose (mg/dL)   Date Value   2021 222 (H) Potassium (mmol/L)   Date Value   04/01/2021 4.8    CREATININE (mg/dL)   Date Value   04/01/2021 0.7         Hyperlipidemia  Current treatment: rosuvastatin 40 mg daily   Recent medication changes: rosuvastatin restarted  No new myalgias or GI upset when taking    Lab Results   Component Value Date    CHOL 215 (H) 04/01/2021    TRIG 98 04/01/2021    HDL 57 04/01/2021    LDLCALC 138 (H) 04/01/2021     Lab Results   Component Value Date    ALT 30 04/01/2021    AST 26 04/01/2021        Hypothyroidism  Diagnosed years prior  Current treatment: levothyroxine 200 mcg daily  Recent medication changes: none  Recent symptoms: none    No results found for: [facility]  Lab Results   Component Value Date    TSH 1.380 04/01/2021    TSH 0.492 03/10/2020    TSH 6.400 (H) 12/03/2019     Asthma  Current treatment: breo and albuterol PRN  Recent medication changes: none  Quit smoking 30 years ago  Breathing is stable     Depression and anxiety   Current treatment: venlafaxine 75 mg daily  Recent medication changes: none  Symptoms are significantly improved    Review of Systems   Constitutional: Negative for chills, fatigue and fever. Respiratory: Negative for cough, shortness of breath and wheezing. Cardiovascular: Negative for chest pain and palpitations. Gastrointestinal: Negative for constipation, diarrhea, nausea and vomiting. Genitourinary: Negative for difficulty urinating and dysuria. Neurological: Negative for headaches. Prior to Visit Medications    Medication Sig Taking?  Authorizing Provider   levothyroxine (SYNTHROID) 200 MCG tablet Take 1 tablet by mouth daily Yes Karis Edward MD   Fluticasone furoate-vilanterol (BREO ELLIPTA) 200-25 MCG/INH AEPB inhaler Inhale 1 puff into the lungs daily Yes Joe Becker DO   lisinopril (PRINIVIL;ZESTRIL) 5 MG tablet Take 1 tablet by mouth daily Yes Joe Becker DO   venlafaxine (EFFEXOR XR) 75 MG extended release capsule Take 1 capsule by mouth daily Yes Joe Becker DO   glimepiride (AMARYL) 4 MG tablet Take 4 mg twice a day Yes Joe Becker DO   metFORMIN (GLUCOPHAGE) 1000 MG tablet Take 1 tablet by mouth 2 times daily (with meals) Yes Joe Becker DO   Cholecalciferol 1.25 MG (66624 UT) TABS Take 1 tablet by mouth once a week Yes Joe Becker DO   insulin glargine (BASAGLAR KWIKPEN) 100 UNIT/ML injection pen Inject 20 Units into the skin nightly Yes Joe Becker DO   rosuvastatin (CRESTOR) 40 MG tablet Take 1 tablet by mouth daily Yes Joe Becker DO   Insulin Pen Needle 32G X 4 MM MISC 1 each by Does not apply route daily Yes Antoni Aguiar DO   blood glucose monitor strips Check twice daily Yes Antoni Aguiar DO   Blood Glucose Monitoring Suppl (BLOOD GLUCOSE MONITOR SYSTEM) w/Device KIT Check blood glucose twice daily Yes Antoni Aguiar DO   Lancets MISC Check blood glucose twice daily Yes Antoni Aguiar DO   albuterol sulfate HFA (PROVENTIL HFA) 108 (90 Base) MCG/ACT inhaler Inhale 2 puffs into the lungs every 4 hours as needed for Wheezing or Shortness of Breath Yes Shelli Nixon, DO        Social History     Tobacco Use    Smoking status: Former Smoker     Packs/day: 0.00     Years: 0.00     Pack years: 0.00     Quit date: 2018     Years since quittin.7    Smokeless tobacco: Never Used    Tobacco comment: quit    Substance Use Topics    Alcohol use: No        Past Surgical History:   Procedure Laterality Date    CHOLECYSTECTOMY      HYSTERECTOMY, TOTAL ABDOMINAL         Vitals:    21 0856 21 0905   BP: (!) 150/80 (!) 144/80   Pulse: 61    Resp: 18    Temp: 97.6 °F (36.4 °C)    TempSrc: Temporal    SpO2: 98%    Weight: 295 lb (133.8 kg)    Height: 5' 4\" (1.626 m)      Estimated body mass index is 50.64 kg/m² as calculated from the following:    Height as of this encounter: 5' 4\" (1.626 m). Weight as of this encounter: 295 lb (133.8 kg).     Physical Exam  Constitutional:       General: above.  Recent labs reviewed in office. Increase Lantus, encourage diabetic diet compliance, regular exercise and weight loss. Follow-up with endocrinology. Increase lisinopril. RTO in 3 months or sooner if needed    Educational materials and/or home exercises printed for patient's review and were included in patient instructions on his/her After Visit Summary and given to patient at the end of visit.       Future Appointments   Date Time Provider Molly Frank   4/22/2021 11:30 AM MHYX MANISH COVID IMM, MODERNA 28 DAY SECOND DOSE MANISH FLU [facility]   6/28/2021  8:40 AM Marin Torre MD [facility]   7/8/2021  8:45 AM [place]         --Lilia Gutiérrez DO on 4/8/2021 at 9:06 AM

## 2021-04-22 ENCOUNTER — IMMUNIZATION (OUTPATIENT)
Dept: PRIMARY CARE CLINIC | Age: 61
End: 2021-04-22
Payer: COMMERCIAL

## 2021-04-22 PROCEDURE — 0012A COVID-19, MODERNA VACCINE 100MCG/0.5ML DOSE: CPT | Performed by: NURSE PRACTITIONER

## 2021-04-22 PROCEDURE — 91301 COVID-19, MODERNA VACCINE 100MCG/0.5ML DOSE: CPT | Performed by: NURSE PRACTITIONER

## 2021-06-07 ENCOUNTER — OFFICE VISIT (OUTPATIENT)
Dept: ENDOCRINOLOGY | Age: 61
End: 2021-06-07
Payer: COMMERCIAL

## 2021-06-07 VITALS
SYSTOLIC BLOOD PRESSURE: 176 MMHG | DIASTOLIC BLOOD PRESSURE: 83 MMHG | BODY MASS INDEX: 50.02 KG/M2 | RESPIRATION RATE: 18 BRPM | HEIGHT: 64 IN | WEIGHT: 293 LBS | OXYGEN SATURATION: 98 % | HEART RATE: 84 BPM

## 2021-06-07 DIAGNOSIS — E55.9 VITAMIN D DEFICIENCY: ICD-10-CM

## 2021-06-07 DIAGNOSIS — E03.9 HYPOTHYROIDISM, UNSPECIFIED TYPE: ICD-10-CM

## 2021-06-07 DIAGNOSIS — Z79.4 TYPE 2 DIABETES MELLITUS WITH HYPERGLYCEMIA, WITH LONG-TERM CURRENT USE OF INSULIN (HCC): Primary | ICD-10-CM

## 2021-06-07 DIAGNOSIS — Z91.119 DIETARY NONCOMPLIANCE: ICD-10-CM

## 2021-06-07 DIAGNOSIS — E11.65 TYPE 2 DIABETES MELLITUS WITH HYPERGLYCEMIA, WITH LONG-TERM CURRENT USE OF INSULIN (HCC): Primary | ICD-10-CM

## 2021-06-07 PROCEDURE — 99214 OFFICE O/P EST MOD 30 MIN: CPT | Performed by: INTERNAL MEDICINE

## 2021-06-07 RX ORDER — INSULIN GLARGINE 100 [IU]/ML
35 INJECTION, SOLUTION SUBCUTANEOUS NIGHTLY
Qty: 25 PEN | Refills: 5 | Status: SHIPPED
Start: 2021-06-07 | End: 2021-12-15 | Stop reason: SDUPTHER

## 2021-06-07 NOTE — PROGRESS NOTES
T4FREE 1.77 (H) 03/10/2020 11:30 AM     Patient reported feeling generally good     Thyroid US 2/2018, I have reviewed images myself --> no thyroid nodules     PAST MEDICAL HISTORY   Past Medical History:   Diagnosis Date    Asthma     Bronchitis     Diabetes mellitus ([address]     Psoriasis     Thyroid disease     Water retention      PAST SURGICAL HISTORY   Past Surgical History:   Procedure Laterality Date    CHOLECYSTECTOMY      HYSTERECTOMY, TOTAL ABDOMINAL  1995     SOCIAL HISTORY   Tobacco:   reports that she quit smoking about 2 years ago. She smoked 0.00 packs per day for 0.00 years. She has never used smokeless tobacco.  Alcol:   reports no history of alcohol use. Illicit Drugs:   reports no history of drug use.     FAMILY HISTORY   Family History   Problem Relation Age of Onset   Silvana Manual Breast Cancer Mother     Colon Cancer Mother 80    Stroke Father     Hypertension Brother      ALLERGIES AND DRUG REACTIONS   Allergies   Allergen Reactions    Pcn [Penicillins]        CURRENT MEDICATIONS   Current Outpatient Medications   Medication Sig Dispense Refill    lisinopril (PRINIVIL;ZESTRIL) 10 MG tablet Take 1 tablet by mouth daily 90 tablet 0    venlafaxine (EFFEXOR XR) 75 MG extended release capsule Take 1 capsule by mouth daily 90 capsule 1    rosuvastatin (CRESTOR) 40 MG tablet Take 1 tablet by mouth daily 90 tablet 1    insulin glargine (BASAGLAR KWIKPEN) 100 UNIT/ML injection pen Inject 30 Units into the skin nightly 5 pen 5    Cholecalciferol 1.25 MG (02930 UT) TABS Take 1 tablet by mouth once a week 4 tablet 5    metFORMIN (GLUCOPHAGE) 1000 MG tablet Take 1 tablet by mouth 2 times daily (with meals) 180 tablet 1    glimepiride (AMARYL) 4 MG tablet Take 4 mg twice a day 180 tablet 1    levothyroxine (SYNTHROID) 200 MCG tablet Take 1 tablet by mouth daily 90 tablet 5    Fluticasone furoate-vilanterol (BREO ELLIPTA) 200-25 MCG/INH AEPB inhaler Inhale 1 puff into the lungs daily 1 each 2    Insulin Pen Needle 32G X 4 MM MISC 1 each by Does not apply route daily 100 each 3    blood glucose monitor strips Check twice daily 100 strip 1    Blood Glucose Monitoring Suppl (BLOOD GLUCOSE MONITOR SYSTEM) w/Device KIT Check blood glucose twice daily 1 kit 0    Lancets MISC Check blood glucose twice daily 100 each 1    albuterol sulfate HFA (PROVENTIL HFA) 108 (90 Base) MCG/ACT inhaler Inhale 2 puffs into the lungs every 4 hours as needed for Wheezing or Shortness of Breath 1 Inhaler 0     No current facility-administered medications for this visit. Review of Systems  Constitutional: No fever, no chills, no diaphoresis, no generalized weakness. HEENT: No blurred vision, No sore throat, no ear pain, no hair loss  Neck: denied any neck swelling, difficulty swallowing,   Cardio-pulmonary: No CP, SOB or palpitation, No orthopnea or PND. No cough or wheezing. GI: No N/V/D, no constipation, No abdominal pain, no melena or hematochezia   : Denied any dysuria, hematuria, flank pain, discharge, or incontinence. Skin: denied any rash, ulcer, Hirsute, or hyperpigmentation. MSK: denied any joint deformity, joint pain/swelling, muscle pain, or back pain. Neuro: no numbness, no tingling, no weakness    OBJECTIVE    BP (!) 176/83   Pulse 84   Resp 18   Ht 5' 4\" (1.626 m)   Wt (!) 304 lb (137.9 kg)   SpO2 98%   BMI 52.18 kg/m²   BP Readings from Last 4 Encounters:   06/07/21 (!) 176/83   04/08/21 (!) 144/80   01/05/21 136/76   11/18/20 (!) 170/80     Wt Readings from Last 6 Encounters:   06/07/21 (!) 304 lb (137.9 kg)   04/08/21 295 lb (133.8 kg)   01/05/21 294 lb (133.4 kg)   11/18/20 289 lb (131.1 kg)   09/01/20 287 lb (130.2 kg)   03/18/20 292 lb (132.5 kg)       Physical examination:  General: awake alert, oriented x3, no abnormal position or movements. Obese   HEENT: normocephalic non-traumatic, no exophthalmos   Neck: supple, no LN enlargement, no thyromegaly, no thyroid tenderness, no JVD.   Pulm: Clear equal air entry no added sounds, no wheezing or rhonchi    CVS: S1 + S2, no murmur, no heave. Dorsalis pedis pulse palpable   Abd: soft lax, no tenderness, no organomegaly, audible bowel sounds.    Skin: warm, no lesions, no rash. + callus, no Ulcers, No acanthosis nigricans  Musculoskeletal: No back tenderness, no kyphosis/scoliosis    Neuro: CN intact, Monofilament sensation decreased bilateral , muscle power normal  Psych: normal mood, and affect      Review of Laboratory Data:  I personally reviewed the following lab:  Lab Results   Component Value Date/Time    WBC 7.4 04/01/2021 10:53 AM    RBC 4.27 04/01/2021 10:53 AM    HGB 12.5 04/01/2021 10:53 AM    HCT 38.6 04/01/2021 10:53 AM    MCV 90.4 04/01/2021 10:53 AM    MCH 29.3 04/01/2021 10:53 AM    MCHC 32.4 04/01/2021 10:53 AM    RDW 13.1 04/01/2021 10:53 AM     04/01/2021 10:53 AM    MPV 9.3 04/01/2021 10:53 AM      Lab Results   Component Value Date/Time     04/01/2021 10:53 AM    K 4.8 04/01/2021 10:53 AM    CO2 23 04/01/2021 10:53 AM    BUN 18 04/01/2021 10:53 AM    CREATININE 0.7 04/01/2021 10:53 AM    CALCIUM 8.9 04/01/2021 10:53 AM    LABGLOM >60 04/01/2021 10:53 AM    GFRAA >60 04/01/2021 10:53 AM      Lab Results   Component Value Date/Time    TSH 1.380 04/01/2021 10:53 AM    T4FREE 1.77 (H) 03/10/2020 11:30 AM     Lab Results   Component Value Date    LABA1C 10.3 04/01/2021    GLUCOSE 222 04/01/2021    MALBCR 25.1 04/01/2021    LABMICR 28.4 04/01/2021    LABCREA 113 04/01/2021     Lab Results   Component Value Date    LABA1C 10.3 04/01/2021    LABA1C 9.9 01/05/2021    LABA1C 11.3 08/31/2020     Lab Results   Component Value Date    CHOL 215 04/01/2021    CHOL 214 08/31/2020    TRIG 98 04/01/2021    TRIG 152 08/31/2020    HDL 57 04/01/2021    HDL 56 08/31/2020     Lab Results   Component Value Date    VITD25 30 08/31/2020    VITD25 15 12/03/2019     ASSESSMENT & RECOMMENDATIONS   Christina Moon, a 62 y.o.-old female seen in for the KWIKPEN) 100 UNIT/ML injection pen    Hemoglobin A1C   2. Dietary noncompliance     3. Hypothyroidism, unspecified type     4. Vitamin D deficiency       Shin Gilbert MD  Endocrinologist, [facility]   [address] [place] [id number]   Phone: [phone]  Fax: [phone]  -------------------   An electronic signature was used to authenticate this note.  Adrian Campbell MD on 6/7/2021 at 12:37 PM

## 2021-06-08 ENCOUNTER — OFFICE VISIT (OUTPATIENT)
Dept: FAMILY MEDICINE CLINIC | Age: 61
End: 2021-06-08
Payer: COMMERCIAL

## 2021-06-08 VITALS
WEIGHT: 293 LBS | OXYGEN SATURATION: 97 % | HEART RATE: 65 BPM | SYSTOLIC BLOOD PRESSURE: 158 MMHG | TEMPERATURE: 97.4 F | HEIGHT: 60 IN | DIASTOLIC BLOOD PRESSURE: 86 MMHG | RESPIRATION RATE: 16 BRPM | BODY MASS INDEX: 57.52 KG/M2

## 2021-06-08 DIAGNOSIS — L03.115 CELLULITIS OF RIGHT LOWER EXTREMITY: Primary | ICD-10-CM

## 2021-06-08 PROCEDURE — 99213 OFFICE O/P EST LOW 20 MIN: CPT | Performed by: FAMILY MEDICINE

## 2021-06-08 RX ORDER — SULFAMETHOXAZOLE AND TRIMETHOPRIM 800; 160 MG/1; MG/1
1 TABLET ORAL 2 TIMES DAILY
Qty: 14 TABLET | Refills: 0 | Status: SHIPPED | OUTPATIENT
Start: 2021-06-08 | End: 2021-06-15

## 2021-06-08 RX ORDER — CEPHALEXIN 500 MG/1
500 CAPSULE ORAL 4 TIMES DAILY
Qty: 28 CAPSULE | Refills: 0 | Status: SHIPPED
Start: 2021-06-08 | End: 2021-08-05 | Stop reason: SDUPTHER

## 2021-06-08 ASSESSMENT — ENCOUNTER SYMPTOMS
COUGH: 0
COLOR CHANGE: 1
NAUSEA: 0
WHEEZING: 0
SHORTNESS OF BREATH: 0
VOMITING: 0

## 2021-06-08 NOTE — PROGRESS NOTES
daily Yes Carol Mejia, DO   blood glucose monitor strips Check twice daily Yes Antoni Aguiar, DO   Blood Glucose Monitoring Suppl (BLOOD GLUCOSE MONITOR SYSTEM) w/Device KIT Check blood glucose twice daily Yes Antoni Aguiar, DO   Lancets MISC Check blood glucose twice daily Yes Antoni Aguiar, DO   albuterol sulfate HFA (PROVENTIL HFA) 108 (90 Base) MCG/ACT inhaler Inhale 2 puffs into the lungs every 4 hours as needed for Wheezing or Shortness of Breath Yes Cortez Salamancaa, DO        Social History     Tobacco Use    Smoking status: Former Smoker     Packs/day: 0.00     Years: 0.00     Pack years: 0.00     Quit date: 2018     Years since quittin.8    Smokeless tobacco: Never Used    Tobacco comment: quit    Substance Use Topics    Alcohol use: No        Past Surgical History:   Procedure Laterality Date    CHOLECYSTECTOMY      HYSTERECTOMY, TOTAL ABDOMINAL  1995       Vitals:    21 1237 21 1245   BP: (!) 160/90 (!) 158/86   Pulse: 65    Resp: 16    Temp: 97.4 °F (36.3 °C)    TempSrc: Temporal    SpO2: 97%    Weight: (!) 302 lb (137 kg)    Height: 5' (1.524 m)      Estimated body mass index is 58.98 kg/m² as calculated from the following:    Height as of this encounter: 5' (1.524 m). Weight as of this encounter: 302 lb (137 kg). Physical Exam  Constitutional:       General: She is not in acute distress. Appearance: She is obese. Eyes:      Extraocular Movements: Extraocular movements intact. Conjunctiva/sclera: Conjunctivae normal.   Cardiovascular:      Rate and Rhythm: Normal rate and regular rhythm. Pulmonary:      Effort: Pulmonary effort is normal. No respiratory distress. Skin:         Neurological:      Mental Status: She is alert. ASSESSMENT/PLAN:  Alok Cifuentes was seen today for wound infection.     Diagnoses and all orders for this visit:    Cellulitis of right lower extremity  -     sulfamethoxazole-trimethoprim (BACTRIM DS;SEPTRA DS) 800-160 MG per tablet; Take 1 tablet by mouth 2 times daily for 7 days  -     cephALEXin (KEFLEX) 500 MG capsule; Take 1 capsule by mouth 4 times daily for 7 days    Additional plan and future considerations:   As above. Advised on signs or symptoms to call or seek immediate care. RTO as scheduled    Educational materials and/or home exercises printed for patient's review and were included in patient instructions on his/her After Visit Summary and given to patient at the end of visit.         Future Appointments   Date Time Provider Molly Frank   7/8/2021  8:45 AM DO Delores Kirkpatrick [facility]   8/31/2021  8:45 AM Marin Sawant MD [facility]         --Rommel Narayanan DO on 6/8/2021 at 12:46 PM

## 2021-06-08 NOTE — LETTER
[address] [address]  Phone: [phone]  Fax: [phone],         June 8, 2021     Patient: Sara Jackson   YOB: 1960   Date of Visit: 6/8/2021       To Whom it May Concern:    Rosanna Arsh was seen in my clinic on 6/8/2021. If you have any questions or concerns, please don't hesitate to call.     Sincerely,         Jaycee Childs, DO

## 2021-07-08 ENCOUNTER — OFFICE VISIT (OUTPATIENT)
Dept: FAMILY MEDICINE CLINIC | Age: 61
End: 2021-07-08

## 2021-07-08 VITALS
OXYGEN SATURATION: 97 % | HEIGHT: 64 IN | DIASTOLIC BLOOD PRESSURE: 80 MMHG | WEIGHT: 292 LBS | SYSTOLIC BLOOD PRESSURE: 140 MMHG | HEART RATE: 63 BPM | TEMPERATURE: 97.8 F | BODY MASS INDEX: 49.85 KG/M2

## 2021-07-08 DIAGNOSIS — Z99.89 OSA ON CPAP: ICD-10-CM

## 2021-07-08 DIAGNOSIS — E11.65 TYPE 2 DIABETES MELLITUS WITH HYPERGLYCEMIA, WITH LONG-TERM CURRENT USE OF INSULIN (HCC): Primary | ICD-10-CM

## 2021-07-08 DIAGNOSIS — Z79.4 TYPE 2 DIABETES MELLITUS WITH HYPERGLYCEMIA, WITH LONG-TERM CURRENT USE OF INSULIN (HCC): Primary | ICD-10-CM

## 2021-07-08 DIAGNOSIS — F41.1 GAD (GENERALIZED ANXIETY DISORDER): ICD-10-CM

## 2021-07-08 DIAGNOSIS — G47.33 OSA ON CPAP: ICD-10-CM

## 2021-07-08 DIAGNOSIS — I15.2 HYPERTENSION ASSOCIATED WITH DIABETES (HCC): ICD-10-CM

## 2021-07-08 DIAGNOSIS — E03.9 ACQUIRED HYPOTHYROIDISM: ICD-10-CM

## 2021-07-08 DIAGNOSIS — E78.5 DYSLIPIDEMIA ASSOCIATED WITH TYPE 2 DIABETES MELLITUS (HCC): ICD-10-CM

## 2021-07-08 DIAGNOSIS — E11.59 HYPERTENSION ASSOCIATED WITH DIABETES (HCC): ICD-10-CM

## 2021-07-08 DIAGNOSIS — E11.69 DYSLIPIDEMIA ASSOCIATED WITH TYPE 2 DIABETES MELLITUS (HCC): ICD-10-CM

## 2021-07-08 PROBLEM — R09.89 GLOBUS SENSATION: Status: RESOLVED | Noted: 2018-02-24 | Resolved: 2021-07-08

## 2021-07-08 PROBLEM — R09.A2 GLOBUS SENSATION: Status: RESOLVED | Noted: 2018-02-24 | Resolved: 2021-07-08

## 2021-07-08 LAB — HBA1C MFR BLD: 9.8 %

## 2021-07-08 PROCEDURE — 83036 HEMOGLOBIN GLYCOSYLATED A1C: CPT | Performed by: FAMILY MEDICINE

## 2021-07-08 PROCEDURE — 99214 OFFICE O/P EST MOD 30 MIN: CPT | Performed by: FAMILY MEDICINE

## 2021-07-08 RX ORDER — VENLAFAXINE HYDROCHLORIDE 75 MG/1
75 CAPSULE, EXTENDED RELEASE ORAL DAILY
Qty: 90 CAPSULE | Refills: 1 | Status: CANCELLED | OUTPATIENT
Start: 2021-07-08

## 2021-07-08 RX ORDER — LISINOPRIL 20 MG/1
20 TABLET ORAL DAILY
Qty: 90 TABLET | Refills: 1 | Status: SHIPPED
Start: 2021-07-08 | End: 2022-02-03 | Stop reason: SDUPTHER

## 2021-07-08 ASSESSMENT — ENCOUNTER SYMPTOMS
COUGH: 0
SHORTNESS OF BREATH: 0
WHEEZING: 0
CONSTIPATION: 0
VOMITING: 0
NAUSEA: 0
DIARRHEA: 0

## 2021-07-08 NOTE — PATIENT INSTRUCTIONS

## 2021-07-13 DIAGNOSIS — G47.33 OSA (OBSTRUCTIVE SLEEP APNEA): Primary | ICD-10-CM

## 2021-07-16 ENCOUNTER — TELEPHONE (OUTPATIENT)
Dept: SLEEP CENTER | Age: 61
End: 2021-07-16

## 2021-07-19 ENCOUNTER — TELEPHONE (OUTPATIENT)
Dept: SLEEP CENTER | Age: 61
End: 2021-07-19

## 2021-07-27 DIAGNOSIS — G47.33 OSA (OBSTRUCTIVE SLEEP APNEA): Primary | ICD-10-CM

## 2021-07-28 ENCOUNTER — TELEPHONE (OUTPATIENT)
Dept: SLEEP CENTER | Age: 61
End: 2021-07-28

## 2021-07-30 ENCOUNTER — TELEPHONE (OUTPATIENT)
Dept: SLEEP CENTER | Age: 61
End: 2021-07-30

## 2021-08-05 ENCOUNTER — OFFICE VISIT (OUTPATIENT)
Dept: FAMILY MEDICINE CLINIC | Age: 61
End: 2021-08-05
Payer: COMMERCIAL

## 2021-08-05 VITALS
OXYGEN SATURATION: 97 % | DIASTOLIC BLOOD PRESSURE: 78 MMHG | BODY MASS INDEX: 49.85 KG/M2 | WEIGHT: 292 LBS | TEMPERATURE: 97.1 F | HEART RATE: 74 BPM | SYSTOLIC BLOOD PRESSURE: 132 MMHG | RESPIRATION RATE: 17 BRPM | HEIGHT: 64 IN

## 2021-08-05 DIAGNOSIS — L03.115 CELLULITIS OF RIGHT LOWER EXTREMITY: Primary | ICD-10-CM

## 2021-08-05 PROCEDURE — 99213 OFFICE O/P EST LOW 20 MIN: CPT | Performed by: NURSE PRACTITIONER

## 2021-08-05 RX ORDER — CEPHALEXIN 500 MG/1
500 CAPSULE ORAL 4 TIMES DAILY
Qty: 28 CAPSULE | Refills: 0 | Status: SHIPPED | OUTPATIENT
Start: 2021-08-05 | End: 2021-08-12

## 2021-08-05 SDOH — ECONOMIC STABILITY: FOOD INSECURITY: WITHIN THE PAST 12 MONTHS, THE FOOD YOU BOUGHT JUST DIDN'T LAST AND YOU DIDN'T HAVE MONEY TO GET MORE.: NEVER TRUE

## 2021-08-05 SDOH — ECONOMIC STABILITY: FOOD INSECURITY: WITHIN THE PAST 12 MONTHS, YOU WORRIED THAT YOUR FOOD WOULD RUN OUT BEFORE YOU GOT MONEY TO BUY MORE.: NEVER TRUE

## 2021-08-05 ASSESSMENT — SOCIAL DETERMINANTS OF HEALTH (SDOH): HOW HARD IS IT FOR YOU TO PAY FOR THE VERY BASICS LIKE FOOD, HOUSING, MEDICAL CARE, AND HEATING?: NOT HARD AT ALL

## 2021-08-05 NOTE — PATIENT INSTRUCTIONS
The medication list included in this document is our record of what you are currently taking, including any changes that were made at today's visit.  If you find any differences when compared to your medications at home, or have any questions that were not answered at your visit, please contact the office. Patient Education        Cellulitis: Care Instructions  Your Care Instructions     Cellulitis is a skin infection caused by bacteria, most often strep or staph. It often occurs after a break in the skin from a scrape, cut, bite, or puncture, or after a rash. Cellulitis may be treated without doing tests to find out what caused it. But your doctor may do tests, if needed, to look for a specific bacteria, like methicillin-resistant Staphylococcus aureus (MRSA). The doctor has checked you carefully, but problems can develop later. If you notice any problems or new symptoms, get medical treatment right away. Follow-up care is a key part of your treatment and safety. Be sure to make and go to all appointments, and call your doctor if you are having problems. It's also a good idea to know your test results and keep a list of the medicines you take. How can you care for yourself at home? · Take your antibiotics as directed. Do not stop taking them just because you feel better. You need to take the full course of antibiotics. · Prop up the infected area on pillows to reduce pain and swelling. Try to keep the area above the level of your heart as often as you can. · If your doctor told you how to care for your wound, follow your doctor's instructions. If you did not get instructions, follow this general advice:  ? Wash the wound with clean water 2 times a day. Don't use hydrogen peroxide or alcohol, which can slow healing. ? You may cover the wound with a thin layer of petroleum jelly, such as Vaseline, and a nonstick bandage. ? Apply more petroleum jelly and replace the bandage as needed.   · Be safe with medicines. Take pain medicines exactly as directed. ? If the doctor gave you a prescription medicine for pain, take it as prescribed. ? If you are not taking a prescription pain medicine, ask your doctor if you can take an over-the-counter medicine. To prevent cellulitis in the future  · Try to prevent cuts, scrapes, or other injuries to your skin. Cellulitis most often occurs where there is a break in the skin. · If you get a scrape, cut, mild burn, or bite, wash the wound with clean water as soon as you can to help avoid infection. Don't use hydrogen peroxide or alcohol, which can slow healing. · If you have swelling in your legs (edema), support stockings and good skin care may help prevent leg sores and cellulitis. · Take care of your feet, especially if you have diabetes or other conditions that increase the risk of infection. Wear shoes and socks. Do not go barefoot. If you have athlete's foot or other skin problems on your feet, talk to your doctor about how to treat them. When should you call for help? Call your doctor now or seek immediate medical care if:    · You have signs that your infection is getting worse, such as:  ? Increased pain, swelling, warmth, or redness. ? Red streaks leading from the area. ? Pus draining from the area. ? A fever.     · You get a rash. Watch closely for changes in your health, and be sure to contact your doctor if:    · You do not get better as expected. Where can you learn more? Go to https://[email or web address].MyOtherDrive. org and sign in to your [employer] account. Enter C668 in the [facility] box to learn more about \"Cellulitis: Care Instructions. \"     If you do not have an account, please click on the \"Sign Up Now\" link. Current as of: March 3, 2021               Content Version: 12.9  © 4971-1994 Healthwise, Incorporated. Care instructions adapted under license by [facility] ([facility] [place]).  If you have questions about a medical condition or this instruction, always ask your healthcare professional. [address] any warranty or liability for your use of this information. Patient Education        cephalexin  Pronunciation:  sef a JUAN in  Brand:  Keflex  What is the most important information I should know about cephalexin? You should not use this medicine if you are allergic to cephalexin or to similar antibiotics, such as Ceftin, Cefzil, Omnicef, and others. Tell your doctor if you are allergic to any drugs, especially penicillins or other antibiotics. What is cephalexin? Cephalexin is a cephalosporin (SEF a low spor in) antibiotic that is used to treat bacterial infections of the lungs, ear, skin, bones, bladder, and kidneys. Cephalexin is used to treat infections in adults and children who are at least 3year old. Cephalexin may also be used for purposes not listed in this medication guide. What should I discuss with my healthcare provider before taking cephalexin? You should not use this medicine if you are allergic to cephalexin or any other cephalosporin antibiotic (cefdinir, cefadroxil, cefoxitin, cefprozil, ceftriaxone, cefuroxime, Omnicef, and others). Tell your doctor if you have ever had:  · an allergy to any drug (especially penicillin);  · liver or kidney disease; or  · intestinal problems, such as colitis. The liquid form of cephalexin may contain sugar. This may affect you if you have diabetes. Tell your doctor if you are pregnant or breast-feeding. How should I take cephalexin? Follow all directions on your prescription label and read all medication guides or instruction sheets. Use the medicine exactly as directed. Do not use cephalexin to treat any condition that has not been checked by your doctor. Measure liquid medicine carefully. Use the dosing syringe provided, or use a medicine dose-measuring device (not a kitchen spoon).   Use this medicine for the full prescribed length of time, even if your symptoms quickly improve. Skipping doses can increase your risk of infection that is resistant to medication. Cephalexin will not treat a viral infection such as the flu or a common cold. Do not share cephalexin with another person, even if they have the same symptoms you have. This medicine can affect the results of certain medical tests. Tell any doctor who treats you that you are using cephalexin. Store the tablets and capsules at room temperature away from moisture, heat, and light. Store the liquid medicine in the refrigerator. Throw away any unused liquid after 14 days. What happens if I miss a dose? Take the medicine as soon as you can, but skip the missed dose if it is almost time for your next dose. Do not take two doses at one time. What happens if I overdose? Seek emergency medical attention or call the Poison Help line at 1-805.640.8783. Overdose symptoms may include nausea, vomiting, stomach pain, diarrhea, and blood in your urine. What should I avoid while taking cephalexin? Antibiotic medicines can cause diarrhea, which may be a sign of a new infection. If you have diarrhea that is watery or bloody, call your doctor before using anti-diarrhea medicine. What are the possible side effects of cephalexin? Get emergency medical help if you have signs of an allergic reaction (hives, difficult breathing, swelling in your face or throat) or a severe skin reaction (fever, sore throat, burning eyes, skin pain, red or purple skin rash with blistering and peeling).   Call your doctor at once if you have:  · severe stomach pain, diarrhea that is watery or bloody (even if it occurs months after your last dose);  · unusual tiredness, feeling light-headed or short of breath;  · easy bruising, unusual bleeding, purple or red spots under your skin;  · a seizure;  · pale skin, cold hands and feet;  · yellowed skin, dark colored urine;  · fever, weakness; or  · pain in your side or lower back, painful urination. Common side effects may include:  · diarrhea;  · nausea, vomiting;  · indigestion, stomach pain; or  · vaginal itching or discharge. This is not a complete list of side effects and others may occur. Call your doctor for medical advice about side effects. You may report side effects to FDA at 5-318-BLD-4559. What other drugs will affect cephalexin? Tell your doctor about all your other medicines, especially:  · metformin; or  · probenecid. This list is not complete. Other drugs may affect cephalexin, including prescription and over-the-counter medicines, vitamins, and herbal products. Not all possible drug interactions are listed here. Where can I get more information? Your pharmacist can provide more information about cephalexin. Remember, keep this and all other medicines out of the reach of children, never share your medicines with others, and use this medication only for the indication prescribed. Every effort has been made to ensure that the information provided by [address] is accurate, up-to-date, and complete, but no guarantee is made to that effect. Drug information contained herein may be time sensitive. [facility] information has been compiled for use by healthcare practitioners and consumers in the [place] and therefore [employer] does not warrant that uses outside of the [place] are appropriate, unless specifically indicated otherwise. [facility] drug information does not endorse drugs, diagnose patients or recommend therapy. [facility] drug information is an informational resource designed to assist licensed healthcare practitioners in caring for their patients and/or to serve consumers viewing this service as a supplement to, and not a substitute for, the expertise, skill, knowledge and judgment of healthcare practitioners.  The absence of a warning for a given drug or drug combination in no way should be construed to indicate that the drug or drug combination is safe, effective or appropriate for any given patient. [facility] does not assume any responsibility for any aspect of healthcare administered with the aid of information [facility] provides. The information contained herein is not intended to cover all possible uses, directions, precautions, warnings, drug interactions, allergic reactions, or adverse effects. If you have questions about the drugs you are taking, check with your doctor, nurse or pharmacist.  Copyright 8577-5844 [address]: 10.03. Revision date: 1/4/2021. Care instructions adapted under license by [facility] ([facility]). If you have questions about a medical condition or this instruction, always ask your healthcare professional. [address] [address] any warranty or liability for your use of this information.

## 2021-08-05 NOTE — PROGRESS NOTES
Chief Complaint   Other (Sore on back of right leg and sore )      History of Present Illness   Source of history provided by: patientYue Lai is a 62 y.o. old female who has a past medical history of:   Past Medical History:   Diagnosis Date    Asthma     Bronchitis     Diabetes mellitus ([address]     Hypertension     Obesity     ZEHRA (obstructive sleep apnea)     Psoriasis     Thyroid disease     Water retention     [facility] leg redness and now a sore on the back of the right leg x 4 days, this morning noticed some swelling in her leg. States symptoms have been gradually worsening since onset. She has history of recurrent cellulitis. She works at [employer] 40 hours a week and is on her feet all day. Denies any CP, dyspnea. Has been using neosporin with some improvement. hx of tobacco use. ROS   Pertinent positives and negatives are stated within HPI, all other systems reviewed and are negative. Surgical History:  has a past surgical history that includes Cholecystectomy and Hysterectomy, total abdominal (1995). Social History:  reports that she quit smoking about 3 years ago. She smoked 0.00 packs per day for 0.00 years. She has never used smokeless tobacco. She reports that she does not drink alcohol and does not use drugs. Family History: family history includes Breast Cancer in her mother; Colon Cancer (age of onset: 80) in her mother; Hypertension in her brother; Stroke in her father. Allergies: Patient has no known allergies. Physical Exam      VS:  /78 (Site: Left Upper Arm, Position: Sitting, Cuff Size: Large Adult)   Pulse 74   Temp 97.1 °F (36.2 °C) (Temporal)   Resp 17   Ht 5' 4\" (1.626 m)   Wt 292 lb (132.5 kg)   SpO2 97%   BMI 50.12 kg/m²        Constitutional:  Alert, development consistent with age. NAD. Head:  NC/NT. Airway patent. Neck:  Normal ROM. Supple.  no anterior cervical adenopathy noted.  Lungs: CTAB without wheezes, rales, or rhonchi. CV:  Regular rate and rhythm, normal heart sounds, without pathological murmurs, ectopy, gallops, or rubs. Skin:  Normal turgor. Warm, dry, lower extremities with stasis dermatitis and right lower leg with a 1.5 cm x 1/2 cm superficial opening without any drainage. No redness or tenderness. Lymphatic: No lymphangitis or adenopathy noted. Neurological:  Oriented. Motor functions intact. Lab / Imaging Results   (All laboratory and radiology results have been personally reviewed by myself)  Labs:  No results found for this visit on 08/05/21. Imaging: All Radiology results interpreted by Radiologist unless otherwise noted. No results found. Medical Decision Making   Pt non-toxic, in no apparent distress and stable at time of discharge. Assessment/Plan   Christina was seen today for other. Diagnoses and all orders for this visit:    Cellulitis of right lower extremity  -     cephALEXin (KEFLEX) 500 MG capsule; Take 1 capsule by mouth 4 times daily for 7 days    Wash twice a day with soap and water rinse well as long as not draining leave open to the air. Watch for any increased redness, pain or red streaks to ER. Take the antibiotics as directed. Follow up with DR Becker if not improving. Possible support hose when at work. Yogurt twice a day to help with gut protection. Jeannette Dandy, APRN - CNP     A comprehensive review of all previous patient history and testing was not conducted. Pertinent findings were elicited during the visit.

## 2021-10-07 ENCOUNTER — TELEPHONE (OUTPATIENT)
Dept: FAMILY MEDICINE CLINIC | Age: 61
End: 2021-10-07

## 2021-10-31 ENCOUNTER — HOSPITAL ENCOUNTER (EMERGENCY)
Age: 61
Discharge: HOME | End: 2021-10-31
Payer: COMMERCIAL

## 2021-10-31 VITALS
DIASTOLIC BLOOD PRESSURE: 62 MMHG | OXYGEN SATURATION: 96 % | RESPIRATION RATE: 20 BRPM | HEART RATE: 66 BPM | WEIGHT: 280 LBS | TEMPERATURE: 97.3 F | SYSTOLIC BLOOD PRESSURE: 136 MMHG | HEIGHT: 64 IN | BODY MASS INDEX: 47.8 KG/M2

## 2021-10-31 DIAGNOSIS — J06.9 VIRAL URI WITH COUGH: Primary | ICD-10-CM

## 2021-10-31 PROCEDURE — 99211 OFF/OP EST MAY X REQ PHY/QHP: CPT

## 2021-10-31 RX ORDER — GUAIFENESIN, PSEUDOEPHEDRINE HYDROCHLORIDE 600; 60 MG/1; MG/1
1 TABLET, EXTENDED RELEASE ORAL EVERY 12 HOURS
Qty: 10 TABLET | Refills: 0 | Status: SHIPPED | OUTPATIENT
Start: 2021-10-31 | End: 2021-11-05

## 2021-10-31 RX ORDER — FLUTICASONE PROPIONATE 50 MCG
1 SPRAY, SUSPENSION (ML) NASAL DAILY
Qty: 16 G | Refills: 0 | Status: SHIPPED | OUTPATIENT
Start: 2021-10-31

## 2021-10-31 NOTE — ED PROVIDER NOTES
[facility]  Department of Emergency Medicine   ED  Encounter Note  Admit Date/RoomTime: 10/31/2021  1:37 PM  ED Room:     NAME: Gennette Merlin  : 1960  MRN: [id number]     Chief Complaint:  Shortness of Breath (started  yesterday with sore throat  sob  congestion), Cough, Pharyngitis, and Chest Congestion    History of Present Illness       Christina Sharma is a 62 y.o. old female who presents to the emergency department with complaint of congestion and cough. Patient states all her symptoms began yesterday. Sinus congestion. Nasal drainage. Headache. Sore throat. Cough. Chest congestion. Patient states she does have asthma. Is concerned that she is getting chest congestion. Did take her inhaler this morning. She denies any fevers. Denies loss of taste or loss of smell. Patient denies any nausea, vomiting or diarrhea. Symptoms are moderate in severity. She has not taken any over-the-counter cough or cold medicine. ROS   Pertinent positives and negatives are stated within HPI, all other systems reviewed and are negative. Past Medical History:  has a past medical history of Asthma, Bronchitis, Diabetes mellitus ([address] Hypertension, Obesity, ZEHRA (obstructive sleep apnea), Psoriasis, Thyroid disease, and Water retention. Surgical History:  has a past surgical history that includes Cholecystectomy and Hysterectomy, total abdominal (). Social History:  reports that she quit smoking about 3 years ago. She smoked 0.00 packs per day for 0.00 years. She has never used smokeless tobacco. She reports that she does not drink alcohol and does not use drugs. Family History: family history includes Breast Cancer in her mother; Colon Cancer (age of onset: 80) in her mother; Hypertension in her brother; Stroke in her father. Allergies: Patient has no known allergies. Physical Exam   Oxygen Saturation Interpretation: Normal on room air analysis.         ED Triage Vitals   BP Temp Temp Source Pulse Resp SpO2 Height Weight   10/31/21 1346 10/31/21 1339 10/31/21 1339 10/31/21 1339 10/31/21 1339 10/31/21 1339 10/31/21 1339 10/31/21 1339   136/62 97.3 °F (36.3 °C) Infrared 66 20 96 % 5' 4\" (1.626 m) 280 lb (127 kg)         General:  NAD. Alert and Oriented. Well-appearing. Skin:  Warm, dry. No rashes. Head:  Normocephalic. Atraumatic. Eyes:  EOMI. Conjunctiva normal.  ENT:  Oral mucosa moist.  Airway patent. Posterior pharynx pink without erythema, without swelling, without exudate. Uvula midline with equal rise and without edema. Bilateral ear canals patent with minimal cerumen. Bilateral TM's with mild bulging, negative erythema. Nasal turbinates with moderate swelling with clear nasal drainage. Frontal and maxillary sinuses nontender to palpation. Neck:  Supple. Normal ROM. Respiratory:  No respiratory distress. No labored breathing. Lungs clear without rales, rhonchi or wheezing. Oxygen saturation 96% on room air. Cardiovascular:  Regular rate. No Murmur. No peripheral edema. Extremities warm and good color. Extremities:  Normal ROM. Nontender to palpation. Atraumatic. Back:  Normal ROM. Nontender to palpation. Neuro:  Alert and Oriented to person, place, time and situation. Normal LOC. Moves all extremities. Speech fluent. Psych:  Calm and Cooperative. Normal thought process. Normal judgement. Lab / Imaging Results   (All laboratory and radiology results have been personally reviewed by myself)  Labs:  No results found for this visit on 10/31/21. Imaging: All Radiology results interpreted by Radiologist unless otherwise noted. No orders to display       ED Course / Medical Decision Making   Medications - No data to display     Re-examination:  10/31/21       Time:    Patients condition .     Consults:   None    Procedures:   none    Medical Decision Making:   Explained to patient that this is viral.  Does not require an antibiotic. Patient prefers taking Mucinex and I will prescribe that for her. Patient is a diabetic. Admits her sugar was over 200 yesterday. Has not checked her sugar here today. I will not write for steroids at this time. Her lungs are clear. No wheezing. Assessment     1. Viral URI with cough New Problem     Plan   Discharged home. Patient condition is good    New Medications     New Prescriptions    FLUTICASONE (FLONASE) 50 MCG/ACT NASAL SPRAY    1 spray by Nasal route daily    PSEUDOEPHEDRINE-GUAIFENESIN (MUCINEX D)  MG PER EXTENDED RELEASE TABLET    Take 1 tablet by mouth every 12 hours for 5 days     Electronically signed by CHELY Galvin   DD: 10/31/21  **This report was transcribed using voice recognition software. Every effort was made to ensure accuracy; however, inadvertent computerized transcription errors may be present.   END OF ED PROVIDER NOTE         Catherine Galvin  10/31/21 2086

## 2021-11-03 ENCOUNTER — OFFICE VISIT (OUTPATIENT)
Dept: FAMILY MEDICINE CLINIC | Age: 61
End: 2021-11-03
Payer: COMMERCIAL

## 2021-11-03 VITALS
HEIGHT: 64 IN | SYSTOLIC BLOOD PRESSURE: 146 MMHG | BODY MASS INDEX: 48.65 KG/M2 | WEIGHT: 285 LBS | DIASTOLIC BLOOD PRESSURE: 72 MMHG | HEART RATE: 76 BPM | RESPIRATION RATE: 18 BRPM | TEMPERATURE: 98.7 F | OXYGEN SATURATION: 96 %

## 2021-11-03 DIAGNOSIS — J40 SINOBRONCHITIS: ICD-10-CM

## 2021-11-03 DIAGNOSIS — T36.95XA ANTIBIOTIC-INDUCED YEAST INFECTION: ICD-10-CM

## 2021-11-03 DIAGNOSIS — J32.9 SINOBRONCHITIS: ICD-10-CM

## 2021-11-03 DIAGNOSIS — J45.41 MODERATE PERSISTENT ASTHMA WITH ACUTE EXACERBATION: ICD-10-CM

## 2021-11-03 DIAGNOSIS — J02.9 SORE THROAT: Primary | ICD-10-CM

## 2021-11-03 DIAGNOSIS — B37.9 ANTIBIOTIC-INDUCED YEAST INFECTION: ICD-10-CM

## 2021-11-03 LAB
Lab: NORMAL
PERFORMING INSTRUMENT: NORMAL
QC PASS/FAIL: NORMAL
S PYO AG THROAT QL: NORMAL
SARS-COV-2, POC: NORMAL

## 2021-11-03 PROCEDURE — 99214 OFFICE O/P EST MOD 30 MIN: CPT | Performed by: NURSE PRACTITIONER

## 2021-11-03 PROCEDURE — 87880 STREP A ASSAY W/OPTIC: CPT | Performed by: NURSE PRACTITIONER

## 2021-11-03 PROCEDURE — 87426 SARSCOV CORONAVIRUS AG IA: CPT | Performed by: NURSE PRACTITIONER

## 2021-11-03 RX ORDER — DOXYCYCLINE HYCLATE 100 MG
100 TABLET ORAL 2 TIMES DAILY
Qty: 20 TABLET | Refills: 0 | Status: SHIPPED | OUTPATIENT
Start: 2021-11-03 | End: 2021-11-13

## 2021-11-03 RX ORDER — BROMPHENIRAMINE MALEATE, PSEUDOEPHEDRINE HYDROCHLORIDE, AND DEXTROMETHORPHAN HYDROBROMIDE 2; 30; 10 MG/5ML; MG/5ML; MG/5ML
10 SYRUP ORAL 4 TIMES DAILY PRN
Qty: 120 ML | Refills: 0 | Status: SHIPPED
Start: 2021-11-03 | End: 2021-12-08

## 2021-11-03 RX ORDER — BENZONATATE 100 MG/1
100 CAPSULE ORAL 3 TIMES DAILY PRN
Qty: 21 CAPSULE | Refills: 0 | Status: SHIPPED | OUTPATIENT
Start: 2021-11-03 | End: 2021-11-10

## 2021-11-03 RX ORDER — FLUCONAZOLE 150 MG/1
150 TABLET ORAL ONCE
Qty: 1 TABLET | Refills: 0 | Status: SHIPPED | OUTPATIENT
Start: 2021-11-03 | End: 2021-11-03

## 2021-11-03 NOTE — PROGRESS NOTES
onset: 80) in her mother; Hypertension in her brother; Stroke in her father. Allergies: Patient has no known allergies. Physical Exam         VS:  BP (!) 146/72   Pulse 76   Temp 98.7 °F (37.1 °C)   Resp 18   Ht 5' 4\" (1.626 m)   Wt 285 lb (129.3 kg)   SpO2 96%   BMI 48.92 kg/m²    Oxygen Saturation Interpretation: Normal.    Constitutional:  Alert, development consistent with age. NAD. Head:  NC/NT. Airway patent. Mouth: Posterior pharynx with mild erythema and clear postnasal drip. No tonsillar hypertrophy or exudate. Neck:  Normal ROM. Supple. No anterior cervical adenopathy noted. Lungs: CTAB without wheezes, rales, or rhonchi. CV:  Regular rate and rhythm, normal heart sounds, without pathological murmurs, ectopy, gallops, or rubs. Skin:  Normal turgor. Warm, dry, without visible rash. Lymphatic: No lymphangitis or adenopathy noted. Neurological:  Oriented. Motor functions intact. Lab / Imaging Results   (All laboratory and radiology results have been personally reviewed by myself)  Labs:  Results for orders placed or performed in visit on 11/03/21   POCT rapid strep A   Result Value Ref Range    Strep A Ag None Detected None Detected   POCT COVID-19, Antigen   Result Value Ref Range    SARS-COV-2, POC Not-Detected Not Detected    Lot Number [id number]     QC Pass/Fail pass     Performing Instrument BD Veritor        Imaging: All Radiology results interpreted by Radiologist unless otherwise noted. Assessment / Plan     Impression(s):  Christina was seen today for cough. Diagnoses and all orders for this visit:    Sore throat  -     POCT rapid strep A (-) in office today    Sinobronchitis  -     brompheniramine-pseudoephedrine-DM (BROMFED DM) 2-30-10 MG/5ML syrup; Take 10 mLs by mouth 4 times daily as needed for Congestion or Cough  -     doxycycline hyclate (VIBRA-TABS) 100 MG tablet;  Take 1 tablet by mouth 2 times daily for 10 days  -     benzonatate (TESSALON PERLES) 100 MG capsule; Take 1 capsule by mouth 3 times daily as needed for Cough  -     POCT COVID-19, Antigen (-) in office today    Moderate persistent asthma with acute exacerbation  -     brompheniramine-pseudoephedrine-DM (BROMFED DM) 2-30-10 MG/5ML syrup; Take 10 mLs by mouth 4 times daily as needed for Congestion or Cough  -     doxycycline hyclate (VIBRA-TABS) 100 MG tablet; Take 1 tablet by mouth 2 times daily for 10 days  -     POCT COVID-19, Antigen    Antibiotic-induced yeast infection  -     fluconazole (DIFLUCAN) 150 MG tablet; Take 1 tablet by mouth once for 1 dose    - Red Flag items & conservative methods discussed  - F/u with PCP if symptoms persist  - Work/school letter provided in AVS    Disposition:  Disposition: Discharge to home. Rapid COVID-19 testing is negative ]in office. Advised cautionary self-quarantine at home in the interim. Increase fluids and rest. Symptomatic relief discussed including Tylenol prn pain/fever. Schedule virtual f/u with PCP in 7-10 days if symptoms persist. ED sooner if symptoms worsen or change. ED immediately with high or refractory fever, progressive SOB, dyspnea, CP, calf pain/swelling, shaking chills, vomiting, abdominal pain, lethargy, flank pain, or decreased urinary output. Pt verbalizes understanding and is in agreement with plan of care. All questions answered. Olinda Beltre, APRN - CNP    **This report was transcribed using voice recognition software. Every effort was made to ensure accuracy; however, inadvertent computerized transcription errors may be present.

## 2021-11-17 DIAGNOSIS — Z79.4 TYPE 2 DIABETES MELLITUS WITH HYPERGLYCEMIA, WITH LONG-TERM CURRENT USE OF INSULIN (HCC): Primary | ICD-10-CM

## 2021-11-17 DIAGNOSIS — E11.65 TYPE 2 DIABETES MELLITUS WITH HYPERGLYCEMIA, WITH LONG-TERM CURRENT USE OF INSULIN (HCC): Primary | ICD-10-CM

## 2021-11-17 RX ORDER — SEMAGLUTIDE 1.34 MG/ML
0.5 INJECTION, SOLUTION SUBCUTANEOUS
Qty: 1 PEN | Refills: 5 | Status: SHIPPED
Start: 2021-11-17 | End: 2022-08-09 | Stop reason: SDUPTHER

## 2021-11-27 ENCOUNTER — PATIENT MESSAGE (OUTPATIENT)
Dept: FAMILY MEDICINE CLINIC | Age: 61
End: 2021-11-27

## 2021-11-27 DIAGNOSIS — F41.1 GAD (GENERALIZED ANXIETY DISORDER): ICD-10-CM

## 2021-11-29 RX ORDER — VENLAFAXINE HYDROCHLORIDE 75 MG/1
75 CAPSULE, EXTENDED RELEASE ORAL DAILY
Qty: 90 CAPSULE | Refills: 0 | Status: SHIPPED
Start: 2021-11-29 | End: 2022-01-31

## 2021-11-29 NOTE — TELEPHONE ENCOUNTER
Last Appointment   7/8/2021  Next Appointment  Visit date not found    My Chart message sent to patient advising her to call office to make a follow up appointment.

## 2021-11-29 NOTE — TELEPHONE ENCOUNTER
From: Jamel Carter  To: Dr. Killian Dadds: 11/27/2021 8:18 AM EST  Subject: Non-Urgent Medical Question    I need my Venledaxine  Refilled please

## 2021-12-08 ENCOUNTER — OFFICE VISIT (OUTPATIENT)
Dept: FAMILY MEDICINE CLINIC | Age: 61
End: 2021-12-08
Payer: COMMERCIAL

## 2021-12-08 VITALS
WEIGHT: 293 LBS | RESPIRATION RATE: 16 BRPM | DIASTOLIC BLOOD PRESSURE: 78 MMHG | OXYGEN SATURATION: 98 % | SYSTOLIC BLOOD PRESSURE: 126 MMHG | TEMPERATURE: 97.6 F | HEART RATE: 62 BPM | HEIGHT: 64 IN | BODY MASS INDEX: 50.02 KG/M2

## 2021-12-08 DIAGNOSIS — E78.5 DYSLIPIDEMIA ASSOCIATED WITH TYPE 2 DIABETES MELLITUS (HCC): ICD-10-CM

## 2021-12-08 DIAGNOSIS — E03.9 ACQUIRED HYPOTHYROIDISM: Chronic | ICD-10-CM

## 2021-12-08 DIAGNOSIS — J45.40 MODERATE PERSISTENT ASTHMA WITHOUT COMPLICATION: ICD-10-CM

## 2021-12-08 DIAGNOSIS — E11.65 TYPE 2 DIABETES MELLITUS WITH HYPERGLYCEMIA, WITH LONG-TERM CURRENT USE OF INSULIN (HCC): ICD-10-CM

## 2021-12-08 DIAGNOSIS — F41.9 ANXIETY AND DEPRESSION: ICD-10-CM

## 2021-12-08 DIAGNOSIS — Z23 NEED FOR IMMUNIZATION AGAINST INFLUENZA: ICD-10-CM

## 2021-12-08 DIAGNOSIS — I15.2 HYPERTENSION ASSOCIATED WITH DIABETES (HCC): Primary | ICD-10-CM

## 2021-12-08 DIAGNOSIS — E11.69 DYSLIPIDEMIA ASSOCIATED WITH TYPE 2 DIABETES MELLITUS (HCC): ICD-10-CM

## 2021-12-08 DIAGNOSIS — Z79.4 TYPE 2 DIABETES MELLITUS WITH HYPERGLYCEMIA, WITH LONG-TERM CURRENT USE OF INSULIN (HCC): ICD-10-CM

## 2021-12-08 DIAGNOSIS — F32.A ANXIETY AND DEPRESSION: ICD-10-CM

## 2021-12-08 DIAGNOSIS — E11.59 HYPERTENSION ASSOCIATED WITH DIABETES (HCC): Primary | ICD-10-CM

## 2021-12-08 LAB — HBA1C MFR BLD: 8.3 %

## 2021-12-08 PROCEDURE — 90471 IMMUNIZATION ADMIN: CPT | Performed by: FAMILY MEDICINE

## 2021-12-08 PROCEDURE — 83036 HEMOGLOBIN GLYCOSYLATED A1C: CPT | Performed by: FAMILY MEDICINE

## 2021-12-08 PROCEDURE — 99214 OFFICE O/P EST MOD 30 MIN: CPT | Performed by: FAMILY MEDICINE

## 2021-12-08 PROCEDURE — 90674 CCIIV4 VAC NO PRSV 0.5 ML IM: CPT | Performed by: FAMILY MEDICINE

## 2021-12-08 PROCEDURE — 3052F HG A1C>EQUAL 8.0%<EQUAL 9.0%: CPT | Performed by: FAMILY MEDICINE

## 2021-12-08 ASSESSMENT — ENCOUNTER SYMPTOMS
SHORTNESS OF BREATH: 0
VOMITING: 0
DIARRHEA: 0
WHEEZING: 0
COUGH: 0
CONSTIPATION: 0
NAUSEA: 0

## 2021-12-08 NOTE — PROGRESS NOTES
Christina Moon (:  1960) is a 62 y.o. female,Established patient, here for evaluation of the following chief complaint(s):  Diabetes and Medication Refill (only needs breo states all others are good )      ASSESSMENT/PLAN:  1. Hypertension associated with diabetes ([facility]  -     HEMOGLOBIN A1C; Future  -     Comprehensive Metabolic Panel; Future  -     LIPID PANEL; Future  -     CBC; Future  2. Type 2 diabetes mellitus with hyperglycemia, with long-term current use of insulin (HCC)  -     POCT glycosylated hemoglobin (Hb A1C)  -     HEMOGLOBIN A1C; Future  -     Comprehensive Metabolic Panel; Future  -     LIPID PANEL; Future  -     VITAMIN B12 & FOLATE; Future  -     CBC; Future  3. Dyslipidemia associated with type 2 diabetes mellitus ([facility]  4. Acquired hypothyroidism  -     TSH; Future  5. Moderate persistent asthma without complication  -     Fluticasone furoate-vilanterol (BREO ELLIPTA) 200-25 MCG/INH AEPB inhaler; Inhale 1 puff into the lungs daily, Disp-1 each, R-5Normal  6. Anxiety and depression  7. Need for immunization against influenza  -     INFLUENZA, MDCK QUADV, 2 YRS AND OLDER, IM, PF, PREFILL SYR OR SDV, 0.5ML (FLUCELVAX QUADV, PF)      Return in about 6 months (around 2022) for Physical, Hypertension, Diabetes, Hypothyroidism.     SUBJECTIVE/OBJECTIVE:  HPI  She also follows with endocrinology and gyn      Chronic problems reviewed today include:  DM, HTN, HLD, Hypothyroidism, anxiety/depression, ZEHRA  Current status of this/these condition(s):  stable  Tolerating meds:         Yes     Diabetes Mellitus Type 2   Current treatment: metformin 1000 mg BID, glimepride 4 mg BID and lantus 35 units daily  Recent medication changes: ozempic started  Poct HbA1c: 8.3    Lab Results   Component Value Date    LABA1C 8.3 2021    LABA1C 9.8 2021    LABA1C 10.3 (H) 2021     Lab Results   Component Value Date    LABMICR 28.4 (H) 2021    CREATININE 0.7 2021     Lab Results   Component Value Date    ALT 30 04/01/2021    AST 26 04/01/2021     Lab Results   Component Value Date    CHOL 215 (H) 04/01/2021    TRIG 98 04/01/2021    HDL 57 04/01/2021    LDLCALC 138 (H) 04/01/2021        Hypertension  Current treatment: lisinopril 20 mg daily  Recent medication changes: lisinopril increased    BP Readings from Last 3 Encounters:   12/08/21 126/78   11/03/21 (!) 146/72   10/31/21 136/62                                          Sodium (mmol/L)   Date Value   04/01/2021 140    BUN (mg/dL)   Date Value   04/01/2021 18    Glucose (mg/dL)   Date Value   04/01/2021 222 (H)      Potassium (mmol/L)   Date Value   04/01/2021 4.8    CREATININE (mg/dL)   Date Value   04/01/2021 0.7         Hypothyroidism  Diagnosed years prior  Current treatment: levothyroxine 200 mcg daily  Recent medication changes: none    No results found for: TSHREFLEX  Lab Results   Component Value Date    TSH 1.380 04/01/2021    TSH 0.492 03/10/2020    TSH 6.400 (H) 12/03/2019     Asthma  Current treatment: breo and albuterol PRN  Recent medication changes: none  Former smoker, quit smoking 30 years ago  Breathing is stable     Depression and anxiety   Current treatment: venlafaxine 75 mg daily  Recent medication changes: none  Symptoms are significantly improved    Review of Systems   Constitutional: Negative for chills, fatigue and fever. Respiratory: Negative for cough, shortness of breath and wheezing. Cardiovascular: Negative for chest pain and palpitations. Gastrointestinal: Negative for constipation, diarrhea, nausea and vomiting. Genitourinary: Negative for difficulty urinating and dysuria. Neurological: Negative for headaches.        Vitals:    12/08/21 1300   BP: 126/78   Pulse: 62   Resp: 16   Temp: 97.6 °F (36.4 °C)   TempSrc: Temporal   SpO2: 98%   Weight: 293 lb (132.9 kg)   Height: 5' 4\" (1.626 m)     Estimated body mass index is 50.29 kg/m² as calculated from the following:    Height as of this encounter: 5' 4\" (1.626 m). Weight as of this encounter: 293 lb (132.9 kg). Physical Exam  Constitutional:       General: She is not in acute distress. Appearance: She is well-developed. HENT:      Head: Normocephalic and atraumatic. Eyes:      Extraocular Movements: Extraocular movements intact. Conjunctiva/sclera: Conjunctivae normal.   Cardiovascular:      Rate and Rhythm: Normal rate and regular rhythm. Pulmonary:      Effort: Pulmonary effort is normal. No respiratory distress. Breath sounds: Normal breath sounds. No wheezing, rhonchi or rales. Abdominal:      General: There is no distension. Musculoskeletal:      Right lower leg: No edema. Left lower leg: No edema. Skin:     Comments: Chronic venous stasis changes bilateral lower extremities   Neurological:      General: No focal deficit present. Mental Status: She is alert. Mental status is at baseline. Prior to Visit Medications    Medication Sig Taking?  Authorizing Provider   Fluticasone furoate-vilanterol (BREO ELLIPTA) 200-25 MCG/INH AEPB inhaler Inhale 1 puff into the lungs daily Yes Joe Becker DO   venlafaxine (EFFEXOR XR) 75 MG extended release capsule Take 1 capsule by mouth daily Yes Joe Becker DO   Semaglutide,0.25 or 0.5MG/DOS, (OZEMPIC, 0.25 OR 0.5 MG/DOSE,) 2 MG/1.5ML SOPN Inject 0.5 mg into the skin every 7 days Yes Veronika Thomason MD   fluticasone (FLONASE) 50 MCG/ACT nasal spray 1 spray by Nasal route daily Yes CHELY Christine   lisinopril (PRINIVIL;ZESTRIL) 20 MG tablet Take 1 tablet by mouth daily Yes Joe Becker DO   insulin glargine (BASAGLAR KWIKPEN) 100 UNIT/ML injection pen Inject 35 Units into the skin nightly Yes Marin Galloway MD   rosuvastatin (CRESTOR) 40 MG tablet Take 1 tablet by mouth daily Yes Joe Becker DO   Cholecalciferol 1.25 MG (08953 UT) TABS Take 1 tablet by mouth once a week Yes Joe Becker DO   metFORMIN (GLUCOPHAGE) 1000 MG tablet Take 1 tablet by mouth 2 times daily (with meals) Yes Joe Becker, DO   glimepiride (AMARYL) 4 MG tablet Take 4 mg twice a day Yes Joe Becker DO   levothyroxine (SYNTHROID) 200 MCG tablet Take 1 tablet by mouth daily Yes Vicky Dey MD   Insulin Pen Needle 32G X 4 MM MISC 1 each by Does not apply route daily Yes Antoni Aguiar, DO   blood glucose monitor strips Check twice daily Yes Antoni Aguiar DO   Blood Glucose Monitoring Suppl (BLOOD GLUCOSE MONITOR SYSTEM) w/Device KIT Check blood glucose twice daily Yes Antoni Aguiar DO   Lancets MISC Check blood glucose twice daily Yes Antoni Aguiar DO   albuterol sulfate HFA (PROVENTIL HFA) 108 (90 Base) MCG/ACT inhaler Inhale 2 puffs into the lungs every 4 hours as needed for Wheezing or Shortness of Breath Yes Tawana Listen, DO            Future Appointments   Date Time Provider Molly Reesei   12/15/2021  8:15 AM Vicky Dey MD [facility]   6/9/2022 10:00 AM Sima Marcano DO [facility]       An electronic signature was used to authenticate this note.     --Sima Marcano DO

## 2021-12-15 ENCOUNTER — OFFICE VISIT (OUTPATIENT)
Dept: ENDOCRINOLOGY | Age: 61
End: 2021-12-15
Payer: COMMERCIAL

## 2021-12-15 VITALS
DIASTOLIC BLOOD PRESSURE: 84 MMHG | BODY MASS INDEX: 49.68 KG/M2 | HEIGHT: 64 IN | WEIGHT: 291 LBS | SYSTOLIC BLOOD PRESSURE: 186 MMHG | HEART RATE: 57 BPM

## 2021-12-15 DIAGNOSIS — E03.9 HYPOTHYROIDISM, UNSPECIFIED TYPE: ICD-10-CM

## 2021-12-15 DIAGNOSIS — E11.65 TYPE 2 DIABETES MELLITUS WITH HYPERGLYCEMIA, WITH LONG-TERM CURRENT USE OF INSULIN (HCC): Primary | ICD-10-CM

## 2021-12-15 DIAGNOSIS — Z91.119 DIETARY NONCOMPLIANCE: ICD-10-CM

## 2021-12-15 DIAGNOSIS — E55.9 VITAMIN D DEFICIENCY: ICD-10-CM

## 2021-12-15 DIAGNOSIS — Z79.4 TYPE 2 DIABETES MELLITUS WITH HYPERGLYCEMIA, WITH LONG-TERM CURRENT USE OF INSULIN (HCC): Primary | ICD-10-CM

## 2021-12-15 PROCEDURE — 3052F HG A1C>EQUAL 8.0%<EQUAL 9.0%: CPT | Performed by: INTERNAL MEDICINE

## 2021-12-15 PROCEDURE — 99214 OFFICE O/P EST MOD 30 MIN: CPT | Performed by: INTERNAL MEDICINE

## 2021-12-15 RX ORDER — INSULIN GLARGINE 100 [IU]/ML
40 INJECTION, SOLUTION SUBCUTANEOUS NIGHTLY
Qty: 25 PEN | Refills: 5 | Status: SHIPPED
Start: 2021-12-15 | End: 2022-02-03 | Stop reason: SDUPTHER

## 2021-12-15 NOTE — PROGRESS NOTES
[address] [place] [address] Department of Endocrinology Diabetes and Metabolism   [address] [facility] [address]   Phone: [phone]  Fax: [phone]    Date of Service: 12/15/2021  Primary Care Physician: Meet Etienne MD      Reason for the visit:  DM type 2     History of Present Illness: The history is provided by the patient. No  was used. Accuracy of the patient data is excellent. Justine Vivar is a very pleasant 62 y.o. female seen today for follow up visit     Christina Stahl was diagnosed with diabetes mid 35s and she is currently on Basaglar 35U nightly,  Amaryl 4 mg BID, Metformin 1000 mg BID. Ozempic 0.5mg weekly  Voices slight nausea with Ozempic for approximately 10 minutes 1-2x week. The patient has been checking blood sugar when she remembers, usually one time a day in the AM, with results are in the 190's. She missed having her labs completed. Most recent A1c results summarized below  A1c improved significantly since last visit   Lab Results   Component Value Date    LABA1C 8.3 12/08/2021    LABA1C 9.8 07/08/2021    LABA1C 10.3 04/01/2021     Patient has had no hypoglycemic episodes   Since last visit,has not been watching her carbohydrate intake as closely as she should. Not interested in speaking with dietician. Reviewed current medications and the patient has no issues with diabetes medications  Christina Moon is up to date with eye exam and denied any history of diabetic retinopathy   The patient performs her own feet care, not seeing podiatry service   Microvascular complications:  No Retinopathy, Nephropathy, + Neuropathy   Macrovascular complications: no CAD, PVD, or Stroke  The patient receives Flushot every year and up to date with the Pneumonia vaccine     Hypothyroidism   Diagnosed long time ago. currently on Levothyroxine 200 mcg daily.  Patient takes levothyroxine in the morning at empty stomach, wait one hour before eating , avoid multivitamins containing calcium or iron with it. Lab Results   Component Value Date/Time    TSH 1.380 04/01/2021 10:53 AM    T4FREE 1.77 (H) 03/10/2020 11:30 AM     Patient reported feeling generally good     Thyroid US 2/2018 showed no thyroid nodules     PAST MEDICAL HISTORY   Past Medical History:   Diagnosis Date    Asthma     Bronchitis     Diabetes mellitus ([address]     Hypertension     Obesity     ZEHRA (obstructive sleep apnea)     Psoriasis     Thyroid disease     Water retention      PAST SURGICAL HISTORY   Past Surgical History:   Procedure Laterality Date    CHOLECYSTECTOMY      HYSTERECTOMY, TOTAL ABDOMINAL  1995     SOCIAL HISTORY   Tobacco:   reports that she quit smoking about 3 years ago. She smoked 0.00 packs per day for 0.00 years. She has never used smokeless tobacco.  Alcol:   reports no history of alcohol use. Illicit Drugs:   reports no history of drug use.     FAMILY HISTORY   Family History   Problem Relation Age of Onset   Ananya Savage Breast Cancer Mother     Colon Cancer Mother 80    Stroke Father     Hypertension Brother      ALLERGIES AND DRUG REACTIONS   No Known Allergies    CURRENT MEDICATIONS   Current Outpatient Medications   Medication Sig Dispense Refill    insulin glargine (BASAGLAR KWIKPEN) 100 UNIT/ML injection pen Inject 40 Units into the skin nightly 25 pen 5    Fluticasone furoate-vilanterol (BREO ELLIPTA) 200-25 MCG/INH AEPB inhaler Inhale 1 puff into the lungs daily 1 each 5    venlafaxine (EFFEXOR XR) 75 MG extended release capsule Take 1 capsule by mouth daily 90 capsule 0    Semaglutide,0.25 or 0.5MG/DOS, (OZEMPIC, 0.25 OR 0.5 MG/DOSE,) 2 MG/1.5ML SOPN Inject 0.5 mg into the skin every 7 days 1 pen 5    fluticasone (FLONASE) 50 MCG/ACT nasal spray 1 spray by Nasal route daily 16 g 0    lisinopril (PRINIVIL;ZESTRIL) 20 MG tablet Take 1 tablet by mouth daily 90 tablet 1    rosuvastatin (CRESTOR) 40 MG tablet Take 1 tablet by mouth daily 90 tablet 1    Cholecalciferol 1.25 MG (11131 UT) TABS Take 1 tablet by mouth once a week 4 tablet 5    metFORMIN (GLUCOPHAGE) 1000 MG tablet Take 1 tablet by mouth 2 times daily (with meals) 180 tablet 1    glimepiride (AMARYL) 4 MG tablet Take 4 mg twice a day 180 tablet 1    levothyroxine (SYNTHROID) 200 MCG tablet Take 1 tablet by mouth daily 90 tablet 5    Insulin Pen Needle 32G X 4 MM MISC 1 each by Does not apply route daily 100 each 3    blood glucose monitor strips Check twice daily 100 strip 1    Blood Glucose Monitoring Suppl (BLOOD GLUCOSE MONITOR SYSTEM) w/Device KIT Check blood glucose twice daily 1 kit 0    Lancets MISC Check blood glucose twice daily 100 each 1    albuterol sulfate HFA (PROVENTIL HFA) 108 (90 Base) MCG/ACT inhaler Inhale 2 puffs into the lungs every 4 hours as needed for Wheezing or Shortness of Breath 1 Inhaler 0     No current facility-administered medications for this visit. Review of Systems  Constitutional: No fever, no chills, no diaphoresis, no generalized weakness. HEENT: No blurred vision, No sore throat, no ear pain, no hair loss  Neck: denied any neck swelling, difficulty swallowing,   Cardio-pulmonary: No CP, SOB or palpitation, No orthopnea or PND. No cough or wheezing. GI: No N/V/D, no constipation, No abdominal pain, no melena or hematochezia   : Denied any dysuria, hematuria, flank pain, discharge, or incontinence. Skin: denied any rash, ulcer, Hirsute, or hyperpigmentation. MSK: denied any joint deformity, joint pain/swelling, muscle pain, or back pain.   Neuro: no numbness, no tingling, no weakness    OBJECTIVE    BP (!) 186/84   Pulse 57   Ht 5' 4\" (1.626 m)   Wt 291 lb (132 kg)   BMI 49.95 kg/m²   BP Readings from Last 4 Encounters:   12/15/21 (!) 186/84   12/08/21 126/78   11/03/21 (!) 146/72   10/31/21 136/62     Wt Readings from Last 6 Encounters:   12/15/21 291 lb (132 kg)   12/08/21 293 lb (132.9 kg)   11/03/21 285 lb (129.3 kg)   10/31/21 280 lb (127 kg)   08/05/21 292 lb (132.5 kg)   07/08/21 292 lb (132.5 kg)       Physical examination:  General: awake alert, oriented x3, no abnormal position or movements. Obese   HEENT: normocephalic non-traumatic, no exophthalmos   Neck: supple, no LN enlargement, no thyromegaly, no thyroid tenderness, no JVD. Pulm: Clear equal air entry no added sounds, no wheezing or rhonchi    CVS: S1 + S2, no murmur, no heave. Dorsalis pedis pulse palpable   Abd: soft lax, no tenderness, no organomegaly, audible bowel sounds.    Skin: warm, no lesions, no rash. + callus, no Ulcers, No acanthosis nigricans  Musculoskeletal: No back tenderness, no kyphosis/scoliosis    Neuro: CN intact, muscle power normal  Psych: normal mood, and affect      Review of Laboratory Data:  I personally reviewed the following lab:  Lab Results   Component Value Date/Time    WBC 7.4 04/01/2021 10:53 AM    RBC 4.27 04/01/2021 10:53 AM    HGB 12.5 04/01/2021 10:53 AM    HCT 38.6 04/01/2021 10:53 AM    MCV 90.4 04/01/2021 10:53 AM    MCH 29.3 04/01/2021 10:53 AM    MCHC 32.4 04/01/2021 10:53 AM    RDW 13.1 04/01/2021 10:53 AM     04/01/2021 10:53 AM    MPV 9.3 04/01/2021 10:53 AM      Lab Results   Component Value Date/Time     04/01/2021 10:53 AM    K 4.8 04/01/2021 10:53 AM    CO2 23 04/01/2021 10:53 AM    BUN 18 04/01/2021 10:53 AM    CREATININE 0.7 04/01/2021 10:53 AM    CALCIUM 8.9 04/01/2021 10:53 AM    LABGLOM >60 04/01/2021 10:53 AM    GFRAA >60 04/01/2021 10:53 AM      Lab Results   Component Value Date/Time    TSH 1.380 04/01/2021 10:53 AM    T4FREE 1.77 (H) 03/10/2020 11:30 AM     Lab Results   Component Value Date    LABA1C 8.3 12/08/2021    GLUCOSE 222 04/01/2021    MALBCR 25.1 04/01/2021    LABMICR 28.4 04/01/2021    LABCREA 113 04/01/2021     Lab Results   Component Value Date    LABA1C 8.3 12/08/2021    LABA1C 9.8 07/08/2021    LABA1C 10.3 04/01/2021     Lab Results   Component Value Date    CHOL 215 contact us with any additional questions. Diagnosis Orders   1. Type 2 diabetes mellitus with hyperglycemia, with long-term current use of insulin (HCC)  insulin glargine (BASAGLAR KWIKPEN) 100 UNIT/ML injection pen    Hemoglobin A1C   2. Dietary noncompliance     3. Vitamin D deficiency     4. Hypothyroidism, unspecified type       Agnes Aj MD    Endocrinologist, [facility] Endocrinology   [address] [place] [id number]   Phone: [phone]  Fax: [phone]  -------------------   An electronic signature was used to authenticate this note.  Agnes Aj MD on 12/15/2021 at 1:56 PM

## 2022-01-03 ENCOUNTER — OFFICE VISIT (OUTPATIENT)
Dept: FAMILY MEDICINE CLINIC | Age: 62
End: 2022-01-03
Payer: COMMERCIAL

## 2022-01-03 VITALS
SYSTOLIC BLOOD PRESSURE: 168 MMHG | BODY MASS INDEX: 49.68 KG/M2 | TEMPERATURE: 97 F | HEART RATE: 68 BPM | HEIGHT: 64 IN | OXYGEN SATURATION: 97 % | DIASTOLIC BLOOD PRESSURE: 80 MMHG | WEIGHT: 291 LBS | RESPIRATION RATE: 17 BRPM

## 2022-01-03 DIAGNOSIS — R52 BODY ACHES: ICD-10-CM

## 2022-01-03 DIAGNOSIS — R19.7 DIARRHEA, UNSPECIFIED TYPE: Primary | ICD-10-CM

## 2022-01-03 DIAGNOSIS — R51.9 NONINTRACTABLE HEADACHE, UNSPECIFIED CHRONICITY PATTERN, UNSPECIFIED HEADACHE TYPE: ICD-10-CM

## 2022-01-03 LAB
Lab: NORMAL
PERFORMING INSTRUMENT: NORMAL
QC PASS/FAIL: NORMAL
SARS-COV-2, POC: NORMAL

## 2022-01-03 PROCEDURE — 99213 OFFICE O/P EST LOW 20 MIN: CPT | Performed by: NURSE PRACTITIONER

## 2022-01-03 PROCEDURE — 87426 SARSCOV CORONAVIRUS AG IA: CPT | Performed by: NURSE PRACTITIONER

## 2022-01-03 RX ORDER — LOPERAMIDE HYDROCHLORIDE 2 MG/1
2 CAPSULE ORAL 4 TIMES DAILY PRN
Qty: 21 CAPSULE | Refills: 0 | Status: SHIPPED
Start: 2022-01-03 | End: 2022-02-03 | Stop reason: SDUPTHER

## 2022-01-03 NOTE — PROGRESS NOTES
Chief Complaint       Diarrhea (body aches, headache , burping and is taste like rotten eggs , since thursday )    History of Present Illness   Source of history provided by:  patient. Rochelle Rhodes is a 62 y.o. old female presenting to the walk in clinic for evaluation of above symptoms, diarrhea for x 5 days. Denies any nausea CP, dyspnea, LE edema, abdominal pain, vomiting, rash, or lethargy. Hx of asthma; no COPD; denies tobacco use. Patient denies recent sick exposures. Patient has has  been vaccinated for COVID-19. Patient has not been taking OTC for symptomatic relief. Works [employer]. Able to eat & drink, though decreased appetite, only 16 oz of water daily. ROS    Unless otherwise stated in this report or unable to obtain because of the patient's clinical or mental status as evidenced by the medical record, this patients's positive and negative responses for Review of Systems, constitutional, psych, eyes, ENT, cardiovascular, respiratory, gastrointestinal, neurological, genitourinary, musculoskeletal, integument systems and systems related to the presenting problem are either stated in the preceding or were not pertinent or were negative for the symptoms and/or complaints related to the medical problem. Past Medical History:  has a past medical history of Asthma, Bronchitis, Diabetes mellitus ([address] Hypertension, Obesity, ZEHRA (obstructive sleep apnea), Psoriasis, Thyroid disease, and Water retention. Past Surgical History:  has a past surgical history that includes Cholecystectomy and Hysterectomy, total abdominal (1995). Social History:  reports that she quit smoking about 3 years ago. She smoked 0.00 packs per day for 0.00 years. She has never used smokeless tobacco. She reports that she does not drink alcohol and does not use drugs.   Family History: family history includes Breast Cancer in her mother; Colon Cancer (age of onset: 80) in her mother; Hypertension in her brother; Stroke in her father. Allergies: Patient has no known allergies. Physical Exam         VS:  BP (!) 168/80   Pulse 68   Temp 97 °F (36.1 °C) (Oral)   Resp 17   Ht 5' 4\" (1.626 m)   Wt 291 lb (132 kg)   SpO2 97%   BMI 49.95 kg/m²    Oxygen Saturation Interpretation: Normal.    Constitutional:  Alert, development consistent with age. NAD. Head:  NC/NT. Airway patent. Cerumen noted. Mouth: Posterior pharynx with mild erythema and clear postnasal drip. No tonsillar hypertrophy or exudate. Neck:  Normal ROM. Supple. No anterior cervical adenopathy noted. Lungs: CTAB without wheezes, rales, or rhonchi. CV:  Regular rate and rhythm, normal heart sounds, without pathological murmurs, ectopy, gallops, or rubs. Skin:  Normal turgor. Warm, dry, without visible rash. Lymphatic: No lymphangitis or adenopathy noted. Neurological:  Oriented. Motor functions intact. Lab / Imaging Results   (All laboratory and radiology results have been personally reviewed by myself)  Labs:  Results for orders placed or performed in visit on 01/03/22   POCT COVID-19, Antigen   Result Value Ref Range    SARS-COV-2, POC Not-Detected Not Detected    Lot Number [id number]     QC Pass/Fail pass     Performing Instrument BD Veritor      Imaging: All Radiology results interpreted by Radiologist unless otherwise noted. Results for orders placed or performed in visit on 01/03/22   POCT COVID-19, Antigen   Result Value Ref Range    SARS-COV-2, POC Not-Detected Not Detected    Lot Number [id number]     QC Pass/Fail pass     Performing Instrument BD Veritor      Assessment / Plan     Impression(s):  Christina was seen today for diarrhea. Diagnoses and all orders for this visit:    Diarrhea, unspecified type  -     POCT COVID-19, Antigen (-) in office today  -     loperamide (RA ANTI-DIARRHEAL) 2 MG capsule; Take 1 capsule by mouth 4 times daily as needed for Diarrhea  -     esomeprazole (NEXIUM) 20 MG delayed release capsule;  Take 1 capsule by mouth every morning (before breakfast) for 14 days    Body aches  -     POCT COVID-19, Antigen    Nonintractable headache, unspecified chronicity pattern, unspecified headache type  -     POCT COVID-19, Antigen    - Red Flag items & conservative methods discussed including OTC methods & Coricidin medication  - F/u with PCP if symptoms persist  - Work/school letter provided in AVS    Disposition:  Disposition: Discharge to home. Advised cautionary self-quarantine at home in the interim. Increase fluids and rest. Symptomatic relief discussed including Tylenol prn pain/fever. Schedule virtual f/u with PCP in 7-10 days if symptoms persist. ED sooner if symptoms worsen or change. ED immediately with high or refractory fever, progressive SOB, dyspnea, CP, calf pain/swelling, shaking chills, vomiting, abdominal pain, lethargy, flank pain, or decreased urinary output. Pt verbalizes understanding and is in agreement with plan of care. All questions answered. Brenda Guillen, APRN - CNP    **This report was transcribed using voice recognition software. Every effort was made to ensure accuracy; however, inadvertent computerized transcription errors may be present.

## 2022-01-19 ENCOUNTER — IMMUNIZATION (OUTPATIENT)
Dept: PRIMARY CARE CLINIC | Age: 62
End: 2022-01-19
Payer: COMMERCIAL

## 2022-01-19 PROCEDURE — 91306 COVID-19, MODERNA BOOSTER VACCINE 0.25ML DOSE: CPT | Performed by: NURSE PRACTITIONER

## 2022-01-19 PROCEDURE — 0064A COVID-19, MODERNA BOOSTER VACCINE 0.25ML DOSE: CPT | Performed by: NURSE PRACTITIONER

## 2022-01-31 DIAGNOSIS — Z12.11 SCREENING FOR MALIGNANT NEOPLASM OF COLON: Primary | ICD-10-CM

## 2022-02-03 ENCOUNTER — OFFICE VISIT (OUTPATIENT)
Dept: FAMILY MEDICINE CLINIC | Age: 62
End: 2022-02-03
Payer: COMMERCIAL

## 2022-02-03 ENCOUNTER — TELEPHONE (OUTPATIENT)
Dept: FAMILY MEDICINE CLINIC | Age: 62
End: 2022-02-03

## 2022-02-03 VITALS
BODY MASS INDEX: 48.49 KG/M2 | SYSTOLIC BLOOD PRESSURE: 138 MMHG | HEART RATE: 63 BPM | RESPIRATION RATE: 16 BRPM | OXYGEN SATURATION: 98 % | WEIGHT: 284 LBS | DIASTOLIC BLOOD PRESSURE: 88 MMHG | TEMPERATURE: 96.7 F | HEIGHT: 64 IN

## 2022-02-03 DIAGNOSIS — J45.40 MODERATE PERSISTENT ASTHMA WITHOUT COMPLICATION: ICD-10-CM

## 2022-02-03 DIAGNOSIS — E11.59 HYPERTENSION ASSOCIATED WITH DIABETES (HCC): ICD-10-CM

## 2022-02-03 DIAGNOSIS — E11.65 TYPE 2 DIABETES MELLITUS WITH HYPERGLYCEMIA, WITH LONG-TERM CURRENT USE OF INSULIN (HCC): ICD-10-CM

## 2022-02-03 DIAGNOSIS — I15.2 HYPERTENSION ASSOCIATED WITH DIABETES (HCC): ICD-10-CM

## 2022-02-03 DIAGNOSIS — Z79.4 TYPE 2 DIABETES MELLITUS WITH HYPERGLYCEMIA, WITH LONG-TERM CURRENT USE OF INSULIN (HCC): ICD-10-CM

## 2022-02-03 DIAGNOSIS — R19.7 DIARRHEA, UNSPECIFIED TYPE: Primary | ICD-10-CM

## 2022-02-03 PROCEDURE — 99214 OFFICE O/P EST MOD 30 MIN: CPT | Performed by: FAMILY MEDICINE

## 2022-02-03 RX ORDER — PANTOPRAZOLE SODIUM 20 MG/1
20 TABLET, DELAYED RELEASE ORAL
Qty: 30 TABLET | Refills: 0 | Status: SHIPPED | OUTPATIENT
Start: 2022-02-03

## 2022-02-03 RX ORDER — LISINOPRIL 20 MG/1
20 TABLET ORAL DAILY
Qty: 90 TABLET | Refills: 1 | Status: SHIPPED
Start: 2022-02-03 | End: 2022-05-12 | Stop reason: SDUPTHER

## 2022-02-03 RX ORDER — LOPERAMIDE HYDROCHLORIDE 2 MG/1
2 CAPSULE ORAL 4 TIMES DAILY PRN
Qty: 21 CAPSULE | Refills: 0 | Status: SHIPPED | OUTPATIENT
Start: 2022-02-03 | End: 2022-02-10

## 2022-02-03 RX ORDER — INSULIN GLARGINE 100 [IU]/ML
40 INJECTION, SOLUTION SUBCUTANEOUS NIGHTLY
Qty: 25 PEN | Refills: 5 | Status: SHIPPED
Start: 2022-02-03 | End: 2022-08-04 | Stop reason: SDUPTHER

## 2022-02-03 ASSESSMENT — ENCOUNTER SYMPTOMS
SHORTNESS OF BREATH: 0
CONSTIPATION: 0
DIARRHEA: 1
NAUSEA: 1
ABDOMINAL DISTENTION: 1
COUGH: 0
WHEEZING: 0
VOMITING: 0

## 2022-02-03 NOTE — TELEPHONE ENCOUNTER
Just received denial for esomeprazole 20 mg.     Preferred alternative is pantoprazole, omeprazole or dexilant

## 2022-02-03 NOTE — PROGRESS NOTES
Christina Valencia (:  1960) is a 62 y.o. female,Established patient, here for evaluation of the following chief complaint(s):  Diarrhea ( States she has had it 5 episodes in the past 2 month. starts out with sulfur like burps. patient doesnt have gastroenterologist. )      ASSESSMENT/PLAN:  1. Diarrhea, unspecified type  -     Douglas Costa MD, Gastroenterology, [facility] ([facility])  -     esomeprazole ([address]) 20 MG delayed release capsule; Take 1 capsule by mouth every morning (before breakfast), Disp-30 capsule, R-0Normal  -     loperamide (RA ANTI-DIARRHEAL) 2 MG capsule; Take 1 capsule by mouth 4 times daily as needed for Diarrhea, Disp-21 capsule, R-0Normal  2. Type 2 diabetes mellitus with hyperglycemia, with long-term current use of insulin (HCC)  -     insulin glargine (BASAGLAR KWIKPEN) 100 UNIT/ML injection pen; Inject 40 Units into the skin nightly, Disp-25 pen, R-5Normal  3. Hypertension associated with diabetes ([address]  -     lisinopril (PRINIVIL;ZESTRIL) 20 MG tablet; Take 1 tablet by mouth daily, Disp-90 tablet, R-1Normal  4. Moderate persistent asthma without complication  -     Fluticasone furoate-vilanterol (BREO ELLIPTA) 200-25 MCG/INH AEPB inhaler; Inhale 1 puff into the lungs daily, Disp-1 each, R-5Normal    As above. Refer to GI for C-scope. Discussed possible medication side effects of metformin and ozempic. Monitor for now, consider trial off ozempic pending GI evaluation. Return for previously scheduled OV.     SUBJECTIVE/OBJECTIVE:  HPI  She also follows with endocrinology and gyn     F/U of chronic problem(s) and new or recent complaint of diarrhea   Chronic problems reviewed today include:  Hypertension, Diabetes mellitus and asthma  Current status of this/these condition(s):  stable  Tolerating meds: Yes    Hypertension  Current treatment: lisinopril 20 mg daily  Recent medication changes: none; lisinopril previously increased    BP Readings from Last 3 Encounters:   22 138/88   01/03/22 (!) 168/80   12/15/21 (!) 186/84                                          Sodium (mmol/L)   Date Value   04/01/2021 140    BUN (mg/dL)   Date Value   04/01/2021 18    Glucose (mg/dL)   Date Value   04/01/2021 222 (H)      Potassium (mmol/L)   Date Value   04/01/2021 4.8    CREATININE (mg/dL)   Date Value   04/01/2021 0.7         Diabetes Mellitus Type 2   Current treatment: metformin 1000 mg BID, glimepride 4 mg BID, lantus 35 units daily, ozempic 0.5 mg weekly  Recent medication changes: ozempic started 6/2021  Last HbA1c: 8.3    Lab Results   Component Value Date    LABA1C 8.3 12/08/2021    LABA1C 9.8 07/08/2021    LABA1C 10.3 (H) 04/01/2021     Lab Results   Component Value Date    LABMICR 28.4 (H) 04/01/2021    CREATININE 0.7 04/01/2021     Lab Results   Component Value Date    ALT 30 04/01/2021    AST 26 04/01/2021     Lab Results   Component Value Date    CHOL 215 (H) 04/01/2021    TRIG 98 04/01/2021    HDL 57 04/01/2021    LDLCALC 138 (H) 04/01/2021        Asthma  Current treatment: breo and albuterol PRN  Recent medication changes: none  Former smoker, quit smoking 30 years ago  Breathing is stable    Review of Systems   Constitutional: Negative for chills, fatigue and fever. Respiratory: Negative for cough, shortness of breath and wheezing. Cardiovascular: Negative for chest pain. Gastrointestinal: Positive for abdominal distention, diarrhea and nausea. Negative for constipation and vomiting. Genitourinary: Negative for difficulty urinating and dysuria. Neurological: Negative for headaches. Vitals:    02/03/22 1053   BP: 138/88   Pulse: 63   Resp: 16   Temp: 96.7 °F (35.9 °C)   TempSrc: Temporal   SpO2: 98%   Weight: 284 lb (128.8 kg)   Height: 5' 4\" (1.626 m)     Estimated body mass index is 48.75 kg/m² as calculated from the following:    Height as of this encounter: 5' 4\" (1.626 m). Weight as of this encounter: 284 lb (128.8 kg).     Physical Exam  Constitutional: General: She is not in acute distress. Appearance: She is well-developed. She is obese. HENT:      Head: Normocephalic and atraumatic. Eyes:      Extraocular Movements: Extraocular movements intact. Conjunctiva/sclera: Conjunctivae normal.   Cardiovascular:      Rate and Rhythm: Normal rate and regular rhythm. Pulmonary:      Effort: Pulmonary effort is normal. No respiratory distress. Breath sounds: Normal breath sounds. No wheezing, rhonchi or rales. Abdominal:      General: There is no distension. Tenderness: There is no abdominal tenderness. There is no guarding. Musculoskeletal:      Right lower leg: No edema. Left lower leg: No edema. Neurological:      General: No focal deficit present. Mental Status: She is alert. Mental status is at baseline. Prior to Visit Medications    Medication Sig Taking?  Authorizing Provider   lisinopril (PRINIVIL;ZESTRIL) 20 MG tablet Take 1 tablet by mouth daily Yes Joe Becker DO   Fluticasone furoate-vilanterol (BREO ELLIPTA) 200-25 MCG/INH AEPB inhaler Inhale 1 puff into the lungs daily Yes Joe Becker DO   insulin glargine (BASAGLAR KWIKPEN) 100 UNIT/ML injection pen Inject 40 Units into the skin nightly Yes Joe Becker DO   esomeprazole (NEXIUM) 20 MG delayed release capsule Take 1 capsule by mouth every morning (before breakfast) Yes Joe Becker DO   loperamide (RA ANTI-DIARRHEAL) 2 MG capsule Take 1 capsule by mouth 4 times daily as needed for Diarrhea Yes Joe Becker DO   venlafaxine (EFFEXOR XR) 75 MG extended release capsule TAKE 1 CAPSULE BY MOUTH DAILY Yes Joe Becker DO   budesonide-formoterol (SYMBICORT) 160-4.5 MCG/ACT AERO Inhale 2 puffs into the lungs 2 times daily Yes Joe Becker DO   Semaglutide,0.25 or 0.5MG/DOS, (OZEMPIC, 0.25 OR 0.5 MG/DOSE,) 2 MG/1.5ML SOPN Inject 0.5 mg into the skin every 7 days Yes Benji Marshall MD   fluticasone (FLONASE) 50 MCG/ACT nasal spray 1 spray by Nasal route daily Yes CHELY Valle   rosuvastatin (CRESTOR) 40 MG tablet Take 1 tablet by mouth daily Yes Joe Becker DO   Cholecalciferol 1.25 MG (51692 UT) TABS Take 1 tablet by mouth once a week Yes Joe Becker DO   metFORMIN (GLUCOPHAGE) 1000 MG tablet Take 1 tablet by mouth 2 times daily (with meals) Yes Joe Becker DO   glimepiride (AMARYL) 4 MG tablet Take 4 mg twice a day Yes Joe Becker DO   levothyroxine (SYNTHROID) 200 MCG tablet Take 1 tablet by mouth daily Yes Marin Chahal MD   Insulin Pen Needle 32G X 4 MM MISC 1 each by Does not apply route daily Yes Antoni Aguiar, DO   blood glucose monitor strips Check twice daily Yes Antoni Aguiar, DO   Blood Glucose Monitoring Suppl (BLOOD GLUCOSE MONITOR SYSTEM) w/Device KIT Check blood glucose twice daily Yes Antoni Aguiar DO   Lancets MISC Check blood glucose twice daily Yes Antoni Aguiar DO   albuterol sulfate HFA (PROVENTIL HFA) 108 (90 Base) MCG/ACT inhaler Inhale 2 puffs into the lungs every 4 hours as needed for Wheezing or Shortness of Breath Yes Scarlet Nieves, DO            Future Appointments   Date Time Provider Molly Reesei   4/21/2022  8:00 AM EMY Crespo - CNS [facility]   6/9/2022 10:00 AM Gretchen Mccarthy DO [facility]       An electronic signature was used to authenticate this note.     --Gretchen Mccarthy, DO

## 2022-02-09 ENCOUNTER — PATIENT MESSAGE (OUTPATIENT)
Dept: FAMILY MEDICINE CLINIC | Age: 62
End: 2022-02-09

## 2022-02-09 DIAGNOSIS — E11.65 UNCONTROLLED TYPE 2 DIABETES MELLITUS WITH HYPERGLYCEMIA (HCC): ICD-10-CM

## 2022-02-09 RX ORDER — GLUCOSAMINE HCL/CHONDROITIN SU 500-400 MG
CAPSULE ORAL
Qty: 100 STRIP | Refills: 1 | Status: SHIPPED | OUTPATIENT
Start: 2022-02-09

## 2022-02-09 NOTE — TELEPHONE ENCOUNTER
From: Ishaan Fox  To: Dr. Emigdio Myers: 2/9/2022 11:01 AM EST  Subject: Test strips    I need more test strips, do I need a prescription

## 2022-03-29 DIAGNOSIS — E03.9 HYPOTHYROIDISM, UNSPECIFIED TYPE: Primary | ICD-10-CM

## 2022-03-29 RX ORDER — LEVOTHYROXINE SODIUM 0.2 MG/1
200 TABLET ORAL DAILY
Qty: 90 TABLET | Refills: 3 | Status: SHIPPED | OUTPATIENT
Start: 2022-03-29

## 2022-08-24 ENCOUNTER — HOSPITAL ENCOUNTER (EMERGENCY)
Age: 62
Discharge: HOME | End: 2022-08-24
Payer: COMMERCIAL

## 2022-08-24 VITALS
HEIGHT: 64 IN | SYSTOLIC BLOOD PRESSURE: 170 MMHG | HEART RATE: 64 BPM | DIASTOLIC BLOOD PRESSURE: 80 MMHG | TEMPERATURE: 98.7 F | RESPIRATION RATE: 18 BRPM | OXYGEN SATURATION: 99 % | BODY MASS INDEX: 48.65 KG/M2 | WEIGHT: 285 LBS

## 2022-08-24 DIAGNOSIS — J02.9 VIRAL PHARYNGITIS: ICD-10-CM

## 2022-08-24 DIAGNOSIS — R51.9 NONINTRACTABLE HEADACHE, UNSPECIFIED CHRONICITY PATTERN, UNSPECIFIED HEADACHE TYPE: Primary | ICD-10-CM

## 2022-08-24 LAB
BASOPHILS ABSOLUTE: 0.06 E9/L (ref 0–0.2)
BASOPHILS RELATIVE PERCENT: 0.9 % (ref 0–2)
CO2: 23 MMOL/L (ref 22–29)
EOSINOPHILS ABSOLUTE: 0.21 E9/L (ref 0.05–0.5)
EOSINOPHILS RELATIVE PERCENT: 3.2 % (ref 0–6)
GFR AFRICAN AMERICAN: >60
GFR NON-AFRICAN AMERICAN: >60 ML/MIN/1.73
GLUCOSE BLD-MCNC: 136 MG/DL (ref 74–99)
HCT VFR BLD CALC: 35.1 % (ref 34–48)
HEMOGLOBIN: 11.4 G/DL (ref 11.5–15.5)
IMMATURE GRANULOCYTES #: 0.01 E9/L
IMMATURE GRANULOCYTES %: 0.2 % (ref 0–5)
LYMPHOCYTES ABSOLUTE: 1.81 E9/L (ref 1.5–4)
LYMPHOCYTES RELATIVE PERCENT: 27.8 % (ref 20–42)
MCH RBC QN AUTO: 29.2 PG (ref 26–35)
MCHC RBC AUTO-ENTMCNC: 32.5 % (ref 32–34.5)
MCV RBC AUTO: 89.8 FL (ref 80–99.9)
MONOCYTES ABSOLUTE: 0.52 E9/L (ref 0.1–0.95)
MONOCYTES RELATIVE PERCENT: 8 % (ref 2–12)
NEUTROPHILS ABSOLUTE: 3.91 E9/L (ref 1.8–7.3)
NEUTROPHILS RELATIVE PERCENT: 59.9 % (ref 43–80)
PDW BLD-RTO: 12.4 FL (ref 11.5–15)
PERFORMED ON: ABNORMAL
PLATELET # BLD: 268 E9/L (ref 130–450)
PMV BLD AUTO: 9 FL (ref 7–12)
POC ANION GAP: 10 MMOL/L (ref 7–16)
POC BUN: 21 MG/DL (ref 8–23)
POC CHLORIDE: 109 MMOL/L (ref 100–108)
POC CREATININE: 0.8 MG/DL (ref 0.5–1)
POC POTASSIUM: 4.4 MMOL/L (ref 3.5–5)
POC SODIUM: 142 MMOL/L (ref 132–146)
RBC # BLD: 3.91 E12/L (ref 3.5–5.5)
STREP GRP A PCR: NEGATIVE
WBC # BLD: 6.5 E9/L (ref 4.5–11.5)

## 2022-08-24 PROCEDURE — 82947 ASSAY GLUCOSE BLOOD QUANT: CPT

## 2022-08-24 PROCEDURE — 85025 COMPLETE CBC W/AUTO DIFF WBC: CPT

## 2022-08-24 PROCEDURE — 84520 ASSAY OF UREA NITROGEN: CPT

## 2022-08-24 PROCEDURE — 87880 STREP A ASSAY W/OPTIC: CPT

## 2022-08-24 PROCEDURE — 99211 OFF/OP EST MAY X REQ PHY/QHP: CPT

## 2022-08-24 PROCEDURE — 80051 ELECTROLYTE PANEL: CPT

## 2022-08-24 PROCEDURE — 36415 COLL VENOUS BLD VENIPUNCTURE: CPT

## 2022-08-24 PROCEDURE — 82565 ASSAY OF CREATININE: CPT

## 2022-08-24 ASSESSMENT — PAIN - FUNCTIONAL ASSESSMENT: PAIN_FUNCTIONAL_ASSESSMENT: 0-10

## 2022-08-24 ASSESSMENT — PAIN SCALES - GENERAL: PAINLEVEL_OUTOF10: 8

## 2022-08-24 ASSESSMENT — PAIN DESCRIPTION - DESCRIPTORS: DESCRIPTORS: ACHING;DISCOMFORT

## 2022-08-24 ASSESSMENT — PAIN DESCRIPTION - LOCATION: LOCATION: HEAD

## 2022-08-24 NOTE — ED PROVIDER NOTES
Department of Emergency [address] [facility]  Provider Note  Admit Date/Time: 2022 11:29 AM  Room:   NAME: Lauren Perdomo  : 1960  MRN: [id number]     Chief Complaint:  Dizziness, Pharyngitis, and Headache    History of Present Illness        Christina Escalera is a 62 y.o. female who has a past medical history of:   Past Medical History:   Diagnosis Date    Asthma     Bronchitis     Diabetes mellitus ([address]     Hypertension     Obesity     ZEHRA (obstructive sleep apnea)     Psoriasis     Thyroid disease     Water retention     presents to the urgent care center by private car for a sore throat and headache. Denies any nasal congestion, fever, body aches, or chest congestion. This started  yesterday. Says she does not have an abnormal cough she said she always has  a cough because she has asthma she is not complaining of chest pain. She is concerned she could have a problem with her blood sugar she should she has not checked it since Monday. She took Tylenol for her headache and it was effective. ROS    Pertinent positives and negatives are stated within HPI, all other systems reviewed and are negative. Past Surgical History:   Procedure Laterality Date    CHOLECYSTECTOMY      COLONOSCOPY      HYSTERECTOMY, TOTAL ABDOMINAL (CERVIX REMOVED)     Social History:  reports that she quit smoking about 4 years ago. Her smoking use included cigarettes. She has never used smokeless tobacco. She reports that she does not drink alcohol and does not use drugs. Family History: family history includes Breast Cancer in her mother; Colon Cancer (age of onset: 80) in her mother; Hypertension in her brother; Stroke in her father.   Allergies: Pcn [penicillins]    Physical Exam   Oxygen Saturation Interpretation: Normal.   ED Triage Vitals   BP Temp Temp src Heart Rate Resp SpO2 Height Weight   22 1136 22 1136 -- 22 1136 22 1136 22 1136 22 1133 08/24/22 1133   (!) 170/80 98.7 °F (37.1 °C)  64 18 99 % 5' 4\" (1.626 m) 285 lb (129.3 kg)       Physical Exam  Constitutional/General: Alert and oriented x3, well appearing, non toxic in NAD  HEENT:  NC/NT. Clear conjuctiva, PERRLA, TMs janet,  Airway patent. No pharyngeal erythema  Neck: Supple, full ROM  Respiratory: Lungs clear to auscultation bilaterally, no wheezes, rales, or rhonchi. Not in respiratory distress  CV:  Regular rate. Regular rhythm. No murmurs, gallops, or rubs. 2+ distal pulses  Chest: No chest wall tenderness  GI:  Abdomen Soft, Non tender, Non distended. +BS. No rebound, guarding, or rigidity. No pulsatile masses. Musculoskeletal: Moves all extremities x 4. Integument: skin warm and dry. No rashes.    Lymphatic: no lymphadenopathy noted  Neurologic: GCS 15, no focal deficits,   Psychiatric: Normal Affect    Lab / Imaging Results   (All laboratory and radiology results have been personally reviewed by myself)  Labs:  Results for orders placed or performed during the hospital encounter of 08/24/22   Strep Screen Group A Throat    Specimen: Throat   Result Value Ref Range    Strep Grp A PCR Negative Negative   CBC with Auto Differential   Result Value Ref Range    WBC 6.5 4.5 - 11.5 E9/L    RBC 3.91 3.50 - 5.50 E12/L    Hemoglobin 11.4 (L) 11.5 - 15.5 g/dL    Hematocrit 35.1 34.0 - 48.0 %    MCV 89.8 80.0 - 99.9 fL    MCH 29.2 26.0 - 35.0 pg    MCHC 32.5 32.0 - 34.5 %    RDW 12.4 11.5 - 15.0 fL    Platelets 689 817 - 367 E9/L    MPV 9.0 7.0 - 12.0 fL    Neutrophils % 59.9 43.0 - 80.0 %    Immature Granulocytes % 0.2 0.0 - 5.0 %    Lymphocytes % 27.8 20.0 - 42.0 %    Monocytes % 8.0 2.0 - 12.0 %    Eosinophils % 3.2 0.0 - 6.0 %    Basophils % 0.9 0.0 - 2.0 %    Neutrophils Absolute 3.91 1.80 - 7.30 E9/L    Immature Granulocytes # 0.01 E9/L    Lymphocytes Absolute 1.81 1.50 - 4.00 E9/L    Monocytes Absolute 0.52 0.10 - 0.95 E9/L    Eosinophils Absolute 0.21 0.05 - 0.50 E9/L    Basophils Absolute 0.06 0.00 - 0.20 E9/L     Imaging: All Radiology results interpreted by Radiologist unless otherwise noted. No orders to display       ED Course / Medical Decision Making   Medications - No data to display       Consult(s):   None    MDM:   She has a sore throat and headache. She said the headache is gone basically because she took Tylenol prior to coming in she did not want anything here for her headache. She does have a sore throat I did a rapid strep and it was negative. She is concerned that maybe she is not feeling good because her blood sugar may be abnormal . She has not checked it for 3 days I did do a CBC and chemistry here and her blood sugar was 136, the  remainder of her labs were normal.  I wanted to do a COVID test she did not want to wait for one here-- I told he that it takes a while and she did not want a send out one  she says she has test at home she will test at home. I did give her an excuse to be off work today and told her if she has any worsening symptoms she needs to go to the emergency department    Plan of Care/Counseling:  I reviewed today's visit with the patient in addition to providing specific details for the plan of care and counseling regarding the diagnosis and prognosis. Questions are answered at this time and are agreeable with the plan. Assessment      1. Nonintractable headache, unspecified chronicity pattern, unspecified headache type    2. Viral pharyngitis      Plan   Discharge to home and advised to contact [facility], [address]  [phone]    Schedule an appointment as soon as possible for a visit    Patient condition is good    New Medications     New Prescriptions    No medications on file     Electronically signed by EMY Musa CNP   DD: 8/24/22  **This report was transcribed using voice recognition software.  Every effort was made to ensure accuracy; however, inadvertent computerized transcription errors may be present.   END OF ED PROVIDER NOTE      Jose Ramírez, EMY - CNP  08/24/22 Yousif Haq, APRN - CNP  08/24/22 [id number]

## 2022-08-24 NOTE — Clinical Note
Angelica Messina was seen and treated in our emergency department on 8/24/2022.     She was absent from work today due to illness    EMY Rodriguez - CNP

## 2022-09-22 DIAGNOSIS — E11.59 HYPERTENSION ASSOCIATED WITH DIABETES (HCC): ICD-10-CM

## 2022-09-22 DIAGNOSIS — I15.2 HYPERTENSION ASSOCIATED WITH DIABETES (HCC): ICD-10-CM

## 2022-09-22 RX ORDER — LISINOPRIL 20 MG/1
20 TABLET ORAL DAILY
Qty: 90 TABLET | Refills: 1 | OUTPATIENT
Start: 2022-09-22

## 2022-09-27 ENCOUNTER — OFFICE VISIT (OUTPATIENT)
Dept: FAMILY MEDICINE CLINIC | Age: 62
End: 2022-09-27
Payer: COMMERCIAL

## 2022-09-27 VITALS
BODY MASS INDEX: 49 KG/M2 | HEIGHT: 64 IN | TEMPERATURE: 97.7 F | OXYGEN SATURATION: 95 % | DIASTOLIC BLOOD PRESSURE: 78 MMHG | WEIGHT: 287 LBS | HEART RATE: 86 BPM | SYSTOLIC BLOOD PRESSURE: 136 MMHG | RESPIRATION RATE: 18 BRPM

## 2022-09-27 DIAGNOSIS — J32.9 SINOBRONCHITIS: Primary | ICD-10-CM

## 2022-09-27 DIAGNOSIS — H61.22 IMPACTED CERUMEN OF LEFT EAR: ICD-10-CM

## 2022-09-27 DIAGNOSIS — J40 SINOBRONCHITIS: Primary | ICD-10-CM

## 2022-09-27 LAB
Lab: NORMAL
QC PASS/FAIL: NORMAL
S PYO AG THROAT QL: NORMAL
SARS-COV-2 RDRP RESP QL NAA+PROBE: NEGATIVE

## 2022-09-27 PROCEDURE — 87635 SARS-COV-2 COVID-19 AMP PRB: CPT | Performed by: NURSE PRACTITIONER

## 2022-09-27 PROCEDURE — 87880 STREP A ASSAY W/OPTIC: CPT | Performed by: NURSE PRACTITIONER

## 2022-09-27 PROCEDURE — 99213 OFFICE O/P EST LOW 20 MIN: CPT | Performed by: NURSE PRACTITIONER

## 2022-09-27 NOTE — PROGRESS NOTES
r    Chief Complaint       Pharyngitis (Headache, post nasal drainage, cough, sneezing, chest congestion since yesterday)    History of Present Illness   Source of history provided by:  patient. Roxy Melendez is a 62 y.o. old female presenting to the walk in clinic for evaluation of above symptoms, for x 1 days. Denies any diarrhea, nausea CP, dyspnea, LE edema, abdominal pain, vomiting, rash, or lethargy. Hx of asthma or COPD; denies tobacco use. Patient denies recent sick exposures. Patient has been vaccinated for COVID-19. Patient has been taking no OTC for symptomatic relief. Works outside the home. Able to eat & drink. ROS    Unless otherwise stated in this report or unable to obtain because of the patient's clinical or mental status as evidenced by the medical record, this patients's positive and negative responses for Review of Systems, constitutional, psych, eyes, ENT, cardiovascular, respiratory, gastrointestinal, neurological, genitourinary, musculoskeletal, integument systems and systems related to the presenting problem are either stated in the preceding or were not pertinent or were negative for the symptoms and/or complaints related to the medical problem. Past Medical History:  has a past medical history of Asthma, Bronchitis, Diabetes mellitus ([address] Hypertension, Obesity, ZEHRA (obstructive sleep apnea), Psoriasis, Thyroid disease, and Water retention. Past Surgical History:  has a past surgical history that includes Cholecystectomy; Hysterectomy, total abdominal (1995); and Colonoscopy (2022). Social History:  reports that she quit smoking about 4 years ago. Her smoking use included cigarettes. She has never used smokeless tobacco. She reports that she does not drink alcohol and does not use drugs. Family History: family history includes Breast Cancer in her mother; Colon Cancer (age of onset: 80) in her mother; Hypertension in her brother; Stroke in her father.    Allergies: Pcn [penicillins]    Physical Exam         VS:  /78   Pulse 86   Temp 97.7 °F (36.5 °C) (Temporal)   Resp 18   Ht 5' 4\" (1.626 m)   Wt 287 lb (130.2 kg)   SpO2 95%   BMI 49.26 kg/m²    Oxygen Saturation Interpretation: Abnormal.    Constitutional:  Alert, development consistent with age. NAD. Head:  NC/NT. Airway patent. Cerumen impaction noted. Mouth: Posterior pharynx with mild erythema and clear postnasal drip. No tonsillar hypertrophy or exudate. Neck:  Normal ROM. Supple. No anterior cervical adenopathy noted. Lungs: CTAB without wheezes, rales, or rhonchi. CV:  Regular rate and rhythm, normal heart sounds, without pathological murmurs, ectopy, gallops, or rubs. Skin:  Normal turgor. Warm, dry, without visible rash. Lymphatic: No lymphangitis or adenopathy noted. Neurological:  Oriented. Motor functions intact. Lab / Imaging Results   (All laboratory and radiology results have been personally reviewed by myself)  Labs:  Results for orders placed or performed in visit on 09/27/22   POCT rapid strep A   Result Value Ref Range    Strep A Ag None Detected None Detected   POCT COVID-19 Rapid, NAAT   Result Value Ref Range    SARS-COV-2, RdRp gene Negative Negative    Lot Number [id number]     QC Pass/Fail Pass        Imaging: All Radiology results interpreted by Radiologist unless otherwise noted. Results for orders placed or performed in visit on 09/27/22   POCT rapid strep A   Result Value Ref Range    Strep A Ag None Detected None Detected   POCT COVID-19 Rapid, NAAT   Result Value Ref Range    SARS-COV-2, RdRp gene Negative Negative    Lot Number [id number]     QC Pass/Fail Pass      Assessment / Plan     Impression(s):  Christina was seen today for pharyngitis.     Diagnoses and all orders for this visit:    Sinobronchitis  -     POCT rapid strep A (-) in office today  -     POCT COVID-19 Rapid, NAAT (-) in office today  - Suggestive of COVID self test in 2 days if symptoms persist    Impacted cerumen of left ear  -     carbamide peroxide (DEBROX) 6.5 % otic solution; Place 5 drops into the left ear in the morning and at bedtime  - F/u with PCP for ear irrigation following one week of use of drops     - Red Flag items & conservative methods discussed including OTC methods & Coricidin medication    Advised cautionary self-quarantine at home in the interim. Increase fluids and rest. Symptomatic relief discussed including Tylenol prn pain/fever. Schedule virtual f/u with PCP in 7-10 days if symptoms persist. ED sooner if symptoms worsen or change. ED immediately with high or refractory fever, progressive SOB, dyspnea, CP, calf pain/swelling, shaking chills, vomiting, abdominal pain, lethargy, flank pain, or decreased urinary output. Pt verbalizes understanding and is in agreement with plan of care. All questions answered. EMY Escudero - CNP    **This report was transcribed using voice recognition software. Every effort was made to ensure accuracy; however, inadvertent computerized transcription errors may be present.

## 2022-09-27 NOTE — LETTER
[address] [place] [address] [place] [id number]  Phone: [phone]  Fax: [phone]    EMY Maxwell CNP        September 27, 2022     Patient: Tanner Gonzalez   YOB: 1960   Date of Visit: 9/27/2022       To Whom It May Concern: It is my medical opinion that Hill Coelho may return to work on 9/28/2022. She tested (-) in the office today for [employer]. If you have any questions or concerns, please don't hesitate to call.     Sincerely,        EMY Maxwell CNP

## 2023-01-09 DIAGNOSIS — E11.65 TYPE 2 DIABETES MELLITUS WITH HYPERGLYCEMIA, WITH LONG-TERM CURRENT USE OF INSULIN (HCC): ICD-10-CM

## 2023-01-09 DIAGNOSIS — Z79.4 TYPE 2 DIABETES MELLITUS WITH HYPERGLYCEMIA, WITH LONG-TERM CURRENT USE OF INSULIN (HCC): ICD-10-CM

## 2023-01-09 RX ORDER — SEMAGLUTIDE 1.34 MG/ML
INJECTION, SOLUTION SUBCUTANEOUS
Qty: 1.5 ML | OUTPATIENT
Start: 2023-01-09

## 2023-02-02 DIAGNOSIS — Z79.4 TYPE 2 DIABETES MELLITUS WITH HYPERGLYCEMIA, WITH LONG-TERM CURRENT USE OF INSULIN (HCC): ICD-10-CM

## 2023-02-02 DIAGNOSIS — E11.65 TYPE 2 DIABETES MELLITUS WITH HYPERGLYCEMIA, WITH LONG-TERM CURRENT USE OF INSULIN (HCC): ICD-10-CM

## 2023-02-02 RX ORDER — SEMAGLUTIDE 1.34 MG/ML
1 INJECTION, SOLUTION SUBCUTANEOUS
Qty: 3 ADJUSTABLE DOSE PRE-FILLED PEN SYRINGE | Refills: 0 | Status: SHIPPED | OUTPATIENT
Start: 2023-02-02

## 2023-02-06 DIAGNOSIS — E03.9 HYPOTHYROIDISM, UNSPECIFIED TYPE: ICD-10-CM

## 2023-02-07 RX ORDER — LEVOTHYROXINE SODIUM 0.2 MG/1
200 TABLET ORAL DAILY
Qty: 90 TABLET | Refills: 0 | Status: SHIPPED | OUTPATIENT
Start: 2023-02-07

## 2023-02-20 ENCOUNTER — OFFICE VISIT (OUTPATIENT)
Dept: ENDOCRINOLOGY | Age: 63
End: 2023-02-20
Payer: COMMERCIAL

## 2023-02-20 VITALS
WEIGHT: 287 LBS | HEIGHT: 64 IN | SYSTOLIC BLOOD PRESSURE: 187 MMHG | HEART RATE: 65 BPM | DIASTOLIC BLOOD PRESSURE: 86 MMHG | OXYGEN SATURATION: 97 % | BODY MASS INDEX: 49 KG/M2

## 2023-02-20 DIAGNOSIS — E03.9 HYPOTHYROIDISM, UNSPECIFIED TYPE: Primary | ICD-10-CM

## 2023-02-20 DIAGNOSIS — E55.9 VITAMIN D DEFICIENCY: ICD-10-CM

## 2023-02-20 DIAGNOSIS — E78.5 HYPERLIPIDEMIA, UNSPECIFIED HYPERLIPIDEMIA TYPE: ICD-10-CM

## 2023-02-20 DIAGNOSIS — E11.65 TYPE 2 DIABETES MELLITUS WITH HYPERGLYCEMIA, WITH LONG-TERM CURRENT USE OF INSULIN (HCC): ICD-10-CM

## 2023-02-20 DIAGNOSIS — Z79.4 TYPE 2 DIABETES MELLITUS WITH HYPERGLYCEMIA, WITH LONG-TERM CURRENT USE OF INSULIN (HCC): ICD-10-CM

## 2023-02-20 PROCEDURE — 3074F SYST BP LT 130 MM HG: CPT | Performed by: INTERNAL MEDICINE

## 2023-02-20 PROCEDURE — 3051F HG A1C>EQUAL 7.0%<8.0%: CPT | Performed by: INTERNAL MEDICINE

## 2023-02-20 PROCEDURE — 99214 OFFICE O/P EST MOD 30 MIN: CPT | Performed by: INTERNAL MEDICINE

## 2023-02-20 PROCEDURE — 3078F DIAST BP <80 MM HG: CPT | Performed by: INTERNAL MEDICINE

## 2023-02-20 RX ORDER — LEVOTHYROXINE SODIUM 0.2 MG/1
200 TABLET ORAL DAILY
Qty: 90 TABLET | Refills: 3 | Status: SHIPPED | OUTPATIENT
Start: 2023-02-20

## 2023-02-20 RX ORDER — INSULIN GLARGINE 100 [IU]/ML
40 INJECTION, SOLUTION SUBCUTANEOUS NIGHTLY
Qty: 25 ADJUSTABLE DOSE PRE-FILLED PEN SYRINGE | Refills: 4 | Status: SHIPPED | OUTPATIENT
Start: 2023-02-20

## 2023-02-20 NOTE — PROGRESS NOTES
[address] [place] [address] Department of Endocrinology Diabetes and Metabolism   [address] [facility] [address]   Phone: [phone]  Fax: [phone]    Date of Service: 2/20/2023  Primary Care Physician: Vilma Aldana MD      Reason for the visit:  DM type 2     History of Present Illness: The history is provided by the patient. No  was used. Accuracy of the patient data is excellent. Diana Chaudhry is a very pleasant 65 y.o. female seen today for follow up visit     Christina Queen was diagnosed with diabetes mid 35s and she is currently on Basaglar 40U nightly,  Amaryl 4 mg BID, Metformin 1000 mg BID. Ozempic 1mg weekly  Voices slight nausea with Ozempic for approximately 10 minutes 1-2x week. The patient has been checking blood sugar when she remembers, usually one time a day in the AM, with results are in the 190's. She missed having her labs completed. Most recent A1c results summarized below  A1c improved significantly since last visit   Lab Results   Component Value Date/Time    LABA1C 7.4 02/16/2023 12:20 PM    LABA1C 8.3 08/04/2022 12:13 PM    LABA1C 8.3 12/08/2021 01:12 PM     Patient has had no hypoglycemic episodes   Since last visit,has not been watching her carbohydrate intake as closely as she should. Not interested in speaking with dietician. Reviewed current medications and the patient has no issues with diabetes medications  Christina Moon is up to date with eye exam and denied any history of diabetic retinopathy   The patient performs her own feet care, not seeing podiatry service   Microvascular complications:  No Retinopathy, Nephropathy, + Neuropathy   Macrovascular complications: no CAD, PVD, or Stroke  The patient receives Flushot every year and up to date with the Pneumonia vaccine     Hypothyroidism   Diagnosed long time ago. currently on Levothyroxine 200 mcg daily.  Patient takes levothyroxine in the morning at empty stomach, wait one hour before eating , avoid multivitamins containing calcium or iron with it. Lab Results   Component Value Date/Time    TSH 0.223 (L) 02/16/2023 12:20 PM    Mary Rob 1.87 02/16/2023 12:20 PM     Patient reported feeling generally good     Thyroid US 2/2018 showed no thyroid nodules     PAST MEDICAL HISTORY   Past Medical History:   Diagnosis Date    Asthma     Bronchitis     Diabetes mellitus ([address]     Hypertension     Obesity     ZEHRA (obstructive sleep apnea)     Psoriasis     Thyroid disease     Water retention      PAST SURGICAL HISTORY   Past Surgical History:   Procedure Laterality Date    CHOLECYSTECTOMY      COLONOSCOPY  2022    HYSTERECTOMY, TOTAL ABDOMINAL (CERVIX REMOVED)  1995     SOCIAL HISTORY   Tobacco:   reports that she quit smoking about 4 years ago. Her smoking use included cigarettes. She has never used smokeless tobacco.  Alcol:   reports no history of alcohol use. Illicit Drugs:   reports no history of drug use.     FAMILY HISTORY   Family History   Problem Relation Age of Onset    Breast Cancer Mother     Colon Cancer Mother 80    Stroke Father     Hypertension Brother      ALLERGIES AND DRUG REACTIONS   Allergies   Allergen Reactions    Pcn [Penicillins]        CURRENT MEDICATIONS   Current Outpatient Medications   Medication Sig Dispense Refill    glimepiride (AMARYL) 4 MG tablet TAKE 1 TABLET BY MOUTH TWICE DAILY 180 tablet 1    metFORMIN (GLUCOPHAGE) 1000 MG tablet TAKE 1 TABLET BY MOUTH TWICE DAILY WITH MEALS 180 tablet 1    levothyroxine (SYNTHROID) 200 MCG tablet TAKE 1 TABLET BY MOUTH DAILY 90 tablet 0    Semaglutide,0.25 or 0.5MG/DOS, (OZEMPIC, 0.25 OR 0.5 MG/DOSE,) 2 MG/1.5ML SOPN Inject 1 mg into the skin every 7 days 3 Adjustable Dose Pre-filled Pen Syringe 0    insulin glargine (BASAGLAR KWIKPEN) 100 UNIT/ML injection pen Inject 40 Units into the skin nightly 25 pen 5    Cholecalciferol 1.25 MG (16904 UT) TABS Take 1 tablet by mouth once a week 4 tablet 5    venlafaxine (EFFEXOR XR) 75 MG extended release capsule Take 1 capsule by mouth daily 90 capsule 1    lisinopril (PRINIVIL;ZESTRIL) 20 MG tablet Take 1 tablet by mouth daily 90 tablet 1    Fluticasone furoate-vilanterol (BREO ELLIPTA) 200-25 MCG/INH AEPB inhaler Inhale 1 puff into the lungs in the morning. 1 each 5    triamcinolone (KENALOG) 0.1 % cream Apply topically 2 times daily. 45 g 1    D3-50 1.25 MG (98634 UT) CAPS TAKE 1 CAPSULE BY MOUTH EVERY WEEK (Patient not taking: No sig reported)      blood glucose monitor strips Check twice daily 100 strip 1    esomeprazole (NEXIUM) 20 MG delayed release capsule Take 1 capsule by mouth every morning (before breakfast) 30 capsule 0    pantoprazole (PROTONIX) 20 MG tablet Take 1 tablet by mouth every morning (before breakfast) 30 tablet 0    budesonide-formoterol (SYMBICORT) 160-4.5 MCG/ACT AERO Inhale 2 puffs into the lungs 2 times daily 30.6 g 1    fluticasone (FLONASE) 50 MCG/ACT nasal spray 1 spray by Nasal route daily 16 g 0    rosuvastatin (CRESTOR) 40 MG tablet Take 1 tablet by mouth daily 90 tablet 1    Insulin Pen Needle 32G X 4 MM MISC 1 each by Does not apply route daily 100 each 3    Blood Glucose Monitoring Suppl (BLOOD GLUCOSE MONITOR SYSTEM) w/Device KIT Check blood glucose twice daily 1 kit 0    Lancets MISC Check blood glucose twice daily 100 each 1    albuterol sulfate HFA (PROVENTIL HFA) 108 (90 Base) MCG/ACT inhaler Inhale 2 puffs into the lungs every 4 hours as needed for Wheezing or Shortness of Breath 1 Inhaler 0     No current facility-administered medications for this visit. Review of Systems  Constitutional: No fever, no chills, no diaphoresis, no generalized weakness. HEENT: No blurred vision, No sore throat, no ear pain, no hair loss  Neck: denied any neck swelling, difficulty swallowing,   Cardio-pulmonary: No CP, SOB or palpitation, No orthopnea or PND. No cough or wheezing.   GI: No N/V/D, no constipation, No abdominal pain, no melena or hematochezia   : Denied any dysuria, hematuria, flank pain, discharge, or incontinence. Skin: denied any rash, ulcer, Hirsute, or hyperpigmentation. MSK: denied any joint deformity, joint pain/swelling, muscle pain, or back pain. Neuro: no numbness, no tingling, no weakness    OBJECTIVE    BP (!) 187/86   Pulse 65   Ht 5' 4\" (1.626 m)   Wt 287 lb (130.2 kg)   SpO2 97%   BMI 49.26 kg/m²   BP Readings from Last 4 Encounters:   02/20/23 (!) 187/86   02/16/23 120/70   09/27/22 136/78   08/24/22 (!) 170/80     Wt Readings from Last 6 Encounters:   02/20/23 287 lb (130.2 kg)   02/16/23 287 lb (130.2 kg)   09/27/22 287 lb (130.2 kg)   08/24/22 285 lb (129.3 kg)   08/04/22 294 lb (133.4 kg)   02/03/22 284 lb (128.8 kg)       Physical examination:  General: awake alert, oriented x3, no abnormal position or movements. Obese   HEENT: normocephalic non-traumatic, no exophthalmos   Neck: supple, no LN enlargement, no thyromegaly, no thyroid tenderness, no JVD. Pulm: Clear equal air entry no added sounds, no wheezing or rhonchi    CVS: S1 + S2, no murmur, no heave. Dorsalis pedis pulse palpable   Abd: soft lax, no tenderness, no organomegaly, audible bowel sounds.    Skin: warm, no lesions, no rash. + callus, no Ulcers, No acanthosis nigricans  Musculoskeletal: No back tenderness, no kyphosis/scoliosis    Neuro: CN intact, muscle power normal  Psych: normal mood, and affect      Review of Laboratory Data:  I personally reviewed the following lab:  Lab Results   Component Value Date/Time    WBC 7.9 02/16/2023 12:20 PM    WBC 6.5 08/24/2022 12:15 PM    RBC 4.12 02/16/2023 12:20 PM    HGB 11.8 02/16/2023 12:20 PM    HCT 35.8 02/16/2023 12:20 PM    MCV 86.9 02/16/2023 12:20 PM    MCH 28.6 02/16/2023 12:20 PM    MCHC 33.0 02/16/2023 12:20 PM    RDW 12.5 02/16/2023 12:20 PM     02/16/2023 12:20 PM     08/24/2022 12:15 PM    MPV 9.4 02/16/2023 12:20 PM      Lab Results   Component Value Date/Time     02/16/2023 12:20 PM    K 5.0 02/16/2023 12:20 PM    CO2 25 02/16/2023 12:20 PM    BUN 27 (H) 02/16/2023 12:20 PM    CREATININE 0.92 02/16/2023 12:20 PM    CALCIUM 9.2 02/16/2023 12:20 PM    LABGLOM >60 08/24/2022 12:22 PM    GFRAA >60 08/24/2022 12:22 PM      Lab Results   Component Value Date/Time    TSH 0.223 (L) 02/16/2023 12:20 PM    T4FREE 1.87 02/16/2023 12:20 PM     Lab Results   Component Value Date/Time    LABA1C 7.4 02/16/2023 12:20 PM    GLUCOSE 97 02/16/2023 12:20 PM    MALBCR 37.4 02/16/2023 12:14 PM    LABMICR 53.1 02/16/2023 12:14 PM    LABCREA 142 02/16/2023 12:14 PM     Lab Results   Component Value Date/Time    LABA1C 7.4 02/16/2023 12:20 PM    LABA1C 8.3 08/04/2022 12:13 PM    LABA1C 8.3 12/08/2021 01:12 PM     Lab Results   Component Value Date/Time    CHOL 211 02/16/2023 12:20 PM    CHOL 231 08/04/2022 12:13 PM    CHOL 215 04/01/2021 10:53 AM    CHOL 214 08/31/2020 09:43 AM    TRIG 101 02/16/2023 12:20 PM    TRIG 155 08/04/2022 12:13 PM    HDL 54 02/16/2023 12:20 PM    HDL 50 08/04/2022 12:13 PM     Lab Results   Component Value Date/Time    VITD25 30 08/31/2020 09:43 AM    VITD25 15 12/03/2019 07:50 AM     ASSESSMENT & RECOMMENDATIONS   Christina Moon, a 62 y.o.-old female seen in for the following issues     Diabetes Mellitus Type 2     Improving control   Will adjust DM regimen to Basaglar 40 U nightly,  Amaryl 4 mg BID, Metformin 1000 mg BID  Increase Ozempic to 1 mg/wk   I discussed side effect profile of GLP-1 agonists with patient    The patient was advised to check blood sugars 2-3 times a day and send BS readings to our office in a week.  Discussed with patient A1c and blood sugar goals   The patient counseled about the complications of uncontrolled diabetes   Diabetes labs before next visit     Dietary noncompliance  Improving   Discussed with patient the importance of eating consistent carbohydrate meals, avoiding high glycemic index food. Also, discussed with  patient the risk and negative consequences of dietary noncompliance on blood glucose control, blood pressure and weight    Hypothyroidism   Continue Levothyroxine 200 mcg daily     Obesity  Discussed lifestyle changes including diet and exercise with patient in depth. Also discussed with patient cardiovascular risk associated with obesity  Started on GLP-1 agonist     HLD  Crestor 40 mg daily     I personally reviewed external notes from PCP and other patient's care team providers, and personally interpreted labs associated with the above diagnosis. I also ordered labs to further assess and manage the above addressed medical conditions    Return in about 4 months (around 6/20/2023) for DM type 2, hypothyroidism, VitD deficiency, Hyperlipidemia. The above issues were reviewed with the patient who understood and agreed with the plan   Thank you for allowing us to participate in the care of this patient. Please do not hesitate to contact us with any additional questions. Diagnosis Orders   1. Hypothyroidism, unspecified type  levothyroxine (SYNTHROID) 200 MCG tablet    TSH    T4, Free      2. Type 2 diabetes mellitus with hyperglycemia, with long-term current use of insulin (HCC)  insulin glargine (BASAGLAR KWIKPEN) 100 UNIT/ML injection pen    Hemoglobin A1C      3. Hyperlipidemia, unspecified hyperlipidemia type        4. Vitamin D deficiency          Marin Meléndez MD    Endocrinologist, [facility]   [address] [place] [address]   Phone: [phone]  Fax: [phone]  -------------------   An electronic signature was used to authenticate this note.  Li Jean MD on 2/20/2023 at 11:15 AM

## 2023-07-07 ENCOUNTER — HOSPITAL ENCOUNTER (EMERGENCY)
Age: 63
Discharge: HOME | End: 2023-07-07
Attending: EMERGENCY MEDICINE
Payer: COMMERCIAL

## 2023-07-07 VITALS
DIASTOLIC BLOOD PRESSURE: 72 MMHG | HEIGHT: 64 IN | RESPIRATION RATE: 18 BRPM | TEMPERATURE: 97.4 F | HEART RATE: 70 BPM | OXYGEN SATURATION: 99 % | WEIGHT: 287 LBS | BODY MASS INDEX: 49 KG/M2 | SYSTOLIC BLOOD PRESSURE: 145 MMHG

## 2023-07-07 DIAGNOSIS — J45.21 MILD INTERMITTENT ASTHMA WITH EXACERBATION: Primary | ICD-10-CM

## 2023-07-07 PROCEDURE — 94664 DEMO&/EVAL PT USE INHALER: CPT

## 2023-07-07 PROCEDURE — 6370000000 HC RX 637 (ALT 250 FOR IP): Performed by: EMERGENCY MEDICINE

## 2023-07-07 PROCEDURE — 99283 EMERGENCY DEPT VISIT LOW MDM: CPT

## 2023-07-07 RX ORDER — PREDNISONE 20 MG/1
40 TABLET ORAL DAILY
Qty: 10 TABLET | Refills: 0 | Status: SHIPPED | OUTPATIENT
Start: 2023-07-07 | End: 2023-07-12

## 2023-07-07 RX ORDER — ALBUTEROL SULFATE 90 UG/1
2 AEROSOL, METERED RESPIRATORY (INHALATION) 4 TIMES DAILY PRN
Qty: 18 G | Refills: 0 | Status: SHIPPED | OUTPATIENT
Start: 2023-07-07

## 2023-07-07 RX ORDER — PREDNISONE 20 MG/1
40 TABLET ORAL ONCE
Status: COMPLETED | OUTPATIENT
Start: 2023-07-07 | End: 2023-07-07

## 2023-07-07 RX ORDER — IPRATROPIUM BROMIDE AND ALBUTEROL SULFATE 2.5; .5 MG/3ML; MG/3ML
1 SOLUTION RESPIRATORY (INHALATION) ONCE
Status: COMPLETED | OUTPATIENT
Start: 2023-07-07 | End: 2023-07-07

## 2023-07-07 RX ADMIN — IPRATROPIUM BROMIDE AND ALBUTEROL SULFATE 1 DOSE: .5; 3 SOLUTION RESPIRATORY (INHALATION) at 12:23

## 2023-07-07 RX ADMIN — PREDNISONE 40 MG: 20 TABLET ORAL at 12:33

## 2023-07-07 ASSESSMENT — ENCOUNTER SYMPTOMS
CHEST TIGHTNESS: 1
WHEEZING: 1
GASTROINTESTINAL NEGATIVE: 1
SHORTNESS OF BREATH: 1
COUGH: 1

## 2023-07-07 ASSESSMENT — PAIN - FUNCTIONAL ASSESSMENT: PAIN_FUNCTIONAL_ASSESSMENT: NONE - DENIES PAIN

## 2023-07-07 NOTE — DISCHARGE INSTRUCTIONS
Avoid asthma triggers. Use albuterol inhaler as needed for shortness of breath. Take the prednisone as directed until gone. Return if breathing becomes more concerning or you have worsening symptoms.
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
## 2023-08-04 ENCOUNTER — HOSPITAL ENCOUNTER (EMERGENCY)
Age: 63
Discharge: HOME | End: 2023-08-04
Attending: EMERGENCY MEDICINE
Payer: COMMERCIAL

## 2023-08-04 ENCOUNTER — APPOINTMENT (OUTPATIENT)
Dept: GENERAL RADIOLOGY | Age: 63
End: 2023-08-04
Payer: COMMERCIAL

## 2023-08-04 VITALS
HEART RATE: 59 BPM | TEMPERATURE: 98.3 F | BODY MASS INDEX: 49.26 KG/M2 | WEIGHT: 287 LBS | DIASTOLIC BLOOD PRESSURE: 78 MMHG | OXYGEN SATURATION: 98 % | RESPIRATION RATE: 17 BRPM | SYSTOLIC BLOOD PRESSURE: 162 MMHG

## 2023-08-04 DIAGNOSIS — J45.21 MILD INTERMITTENT ASTHMA WITH ACUTE EXACERBATION: Primary | ICD-10-CM

## 2023-08-04 DIAGNOSIS — I10 ESSENTIAL HYPERTENSION: ICD-10-CM

## 2023-08-04 LAB
ALBUMIN SERPL-MCNC: 3.9 G/DL (ref 3.5–5.2)
ALP SERPL-CCNC: 136 U/L (ref 35–104)
ALT SERPL-CCNC: 12 U/L (ref 0–32)
ANION GAP SERPL CALCULATED.3IONS-SCNC: 9 MMOL/L (ref 7–16)
AST SERPL-CCNC: 14 U/L (ref 0–31)
BASOPHILS # BLD: 0.04 K/UL (ref 0–0.2)
BASOPHILS NFR BLD: 1 % (ref 0–2)
BILIRUB SERPL-MCNC: 0.2 MG/DL (ref 0–1.2)
BUN SERPL-MCNC: 24 MG/DL (ref 6–23)
CALCIUM SERPL-MCNC: 9.1 MG/DL (ref 8.6–10.2)
CHLORIDE SERPL-SCNC: 104 MMOL/L (ref 98–107)
CO2 SERPL-SCNC: 26 MMOL/L (ref 22–29)
CREAT SERPL-MCNC: 0.9 MG/DL (ref 0.5–1)
EOSINOPHIL # BLD: 0.34 K/UL (ref 0.05–0.5)
EOSINOPHILS RELATIVE PERCENT: 5 % (ref 0–6)
ERYTHROCYTE [DISTWIDTH] IN BLOOD BY AUTOMATED COUNT: 12.4 % (ref 11.5–15)
GFR SERPL CREATININE-BSD FRML MDRD: >60 ML/MIN/1.73M2
GLUCOSE SERPL-MCNC: 157 MG/DL (ref 74–107)
HCT VFR BLD AUTO: 35.2 % (ref 34–48)
HGB BLD-MCNC: 11.3 G/DL (ref 11.5–15.5)
IMM GRANULOCYTES # BLD AUTO: <0.03 K/UL (ref 0–0.58)
IMM GRANULOCYTES NFR BLD: 0 % (ref 0–5)
LYMPHOCYTES NFR BLD: 1.99 K/UL (ref 1.5–4)
LYMPHOCYTES RELATIVE PERCENT: 27 % (ref 20–42)
MCH RBC QN AUTO: 29.1 PG (ref 26–35)
MCHC RBC AUTO-ENTMCNC: 32.1 G/DL (ref 32–34.5)
MCV RBC AUTO: 90.7 FL (ref 80–99.9)
MONOCYTES NFR BLD: 0.66 K/UL (ref 0.1–0.95)
MONOCYTES NFR BLD: 9 % (ref 2–12)
NEUTROPHILS NFR BLD: 59 % (ref 43–80)
NEUTS SEG NFR BLD: 4.44 K/UL (ref 1.8–7.3)
PLATELET # BLD AUTO: 304 K/UL (ref 130–450)
PMV BLD AUTO: 9.1 FL (ref 7–12)
POTASSIUM SERPL-SCNC: 4.8 MMOL/L (ref 3.5–5)
PROT SERPL-MCNC: 6.8 G/DL (ref 6.4–8.3)
RBC # BLD AUTO: 3.88 M/UL (ref 3.5–5.5)
SODIUM SERPL-SCNC: 139 MMOL/L (ref 132–146)
TROPONIN I SERPL HS-MCNC: 31 NG/L (ref 0–9)
TROPONIN I SERPL HS-MCNC: 34 NG/L (ref 0–9)
WBC OTHER # BLD: 7.5 K/UL (ref 4.5–11.5)

## 2023-08-04 PROCEDURE — 96374 THER/PROPH/DIAG INJ IV PUSH: CPT

## 2023-08-04 PROCEDURE — 85025 COMPLETE CBC W/AUTO DIFF WBC: CPT

## 2023-08-04 PROCEDURE — 6370000000 HC RX 637 (ALT 250 FOR IP)

## 2023-08-04 PROCEDURE — 99284 EMERGENCY DEPT VISIT MOD MDM: CPT

## 2023-08-04 PROCEDURE — 36415 COLL VENOUS BLD VENIPUNCTURE: CPT

## 2023-08-04 PROCEDURE — 6360000002 HC RX W HCPCS

## 2023-08-04 PROCEDURE — 94664 DEMO&/EVAL PT USE INHALER: CPT

## 2023-08-04 PROCEDURE — 71045 X-RAY EXAM CHEST 1 VIEW: CPT

## 2023-08-04 PROCEDURE — 80053 COMPREHEN METABOLIC PANEL: CPT

## 2023-08-04 PROCEDURE — 84484 ASSAY OF TROPONIN QUANT: CPT

## 2023-08-04 RX ORDER — IPRATROPIUM BROMIDE AND ALBUTEROL SULFATE 2.5; .5 MG/3ML; MG/3ML
1 SOLUTION RESPIRATORY (INHALATION)
Status: DISCONTINUED | OUTPATIENT
Start: 2023-08-04 | End: 2023-08-04

## 2023-08-04 RX ADMIN — METHYLPREDNISOLONE SODIUM SUCCINATE 40 MG: 40 INJECTION, POWDER, FOR SOLUTION INTRAMUSCULAR; INTRAVENOUS at 11:31

## 2023-08-04 RX ADMIN — IPRATROPIUM BROMIDE AND ALBUTEROL SULFATE 1 DOSE: 2.5; .5 SOLUTION RESPIRATORY (INHALATION) at 11:26

## 2023-08-04 ASSESSMENT — PAIN - FUNCTIONAL ASSESSMENT: PAIN_FUNCTIONAL_ASSESSMENT: NONE - DENIES PAIN

## 2023-08-04 ASSESSMENT — LIFESTYLE VARIABLES
HOW MANY STANDARD DRINKS CONTAINING ALCOHOL DO YOU HAVE ON A TYPICAL DAY: PATIENT DOES NOT DRINK
HOW OFTEN DO YOU HAVE A DRINK CONTAINING ALCOHOL: NEVER

## 2023-08-04 NOTE — ED PROVIDER NOTES
Department of Emergency Medicine     Written by: Katheryn Fournier DO  Patient Name: Catherine Ibarra Date: 2023 10:38 AM  MRN: [id number]                   : 1960      HPI  Chief Complaint   Patient presents with    Shortness of Breath    Chest Pain     Pt complains of shortness of breath in chest x3 weeks. Worsening today with tightness that wraps around to back. Pt says that walking short distances exacerbates the SOB. Hx of asthma. This is a 58-year-old female past medical history of asthma, bronchitis, diabetes, hypertension who presents to the emergency department today complaining of shortness of breath. She states that this feels like a very typical asthma exacerbation for her. She states that she has been having this problem for approximately 1 week. She states that it is hard for her to catch a deep breath and feels tightness in her chest.  She denies headache, dizziness, nausea, vomiting, fever. Review of systems:    Pertinent positives and negatives mentioned in the HPI/MDM. Physical Exam  Constitutional:       General: She is not in acute distress. HENT:      Head: Normocephalic and atraumatic. Eyes:      Extraocular Movements: Extraocular movements intact. Pupils: Pupils are equal, round, and reactive to light. Cardiovascular:      Rate and Rhythm: Normal rate and regular rhythm. Heart sounds: No murmur heard. Pulmonary:      Effort: Pulmonary effort is normal.      Breath sounds: No stridor. Examination of the right-upper field reveals wheezing. Examination of the left-upper field reveals wheezing. Wheezing present. No rhonchi or rales. Abdominal:      General: Abdomen is flat. There is no distension. Palpations: Abdomen is soft. Tenderness: There is no guarding. Musculoskeletal:         General: No deformity. Normal range of motion. Cervical back: Normal range of motion. Skin:     Coloration: Skin is not jaundiced.       Findings: No

## 2023-08-04 NOTE — DISCHARGE INSTRUCTIONS
Asthma Attack: Care Instructions  Your Care Instructions    During an asthma attack, the airways swell and narrow. This makes it hard to breathe. Severe asthma attacks can be life-threatening, but you can help prevent them by keeping your asthma under control and treating symptoms before they get bad. Symptoms include being short of breath, having chest tightness, coughing, and wheezing. Noting and treating these symptoms can also help you avoid future trips to the emergency room. The doctor has checked you carefully, but problems can develop later. If you notice any problems or new symptoms, get medical treatment right away. Follow-up care is a key part of your treatment and safety. Be sure to make and go to all appointments, and call your doctor if you are having problems. It's also a good idea to know your test results and keep a list of the medicines you take. How can you care for yourself at home? Follow your asthma action plan to prevent and treat attacks. If you don't have an asthma action plan, work with your doctor to create one. Take your asthma medicines exactly as prescribed. Talk to your doctor right away if you have any questions about how to take them. Use your quick-relief medicine when you have symptoms of an attack. Quick-relief medicine is usually an albuterol inhaler. Some people need to use quick-relief medicine before they exercise. Take your controller medicine every day, not just when you have symptoms. Controller medicine is usually an inhaled corticosteroid. The goal is to prevent problems before they occur. Don't use your controller medicine to treat an attack that has already started. It doesn't work fast enough to help. If your doctor prescribed corticosteroid pills to use during an attack, take them exactly as prescribed. It may take hours for the pills to work, but they may make the episode shorter and help you breathe better.   Keep your quick-relief medicine with you at

## 2024-01-04 PROBLEM — E11.69 DYSLIPIDEMIA ASSOCIATED WITH TYPE 2 DIABETES MELLITUS (HCC): Status: RESOLVED | Noted: 2018-03-08 | Resolved: 2024-01-04

## 2024-01-04 PROBLEM — E11.59 HYPERTENSION ASSOCIATED WITH DIABETES (HCC): Status: RESOLVED | Noted: 2021-04-08 | Resolved: 2024-01-04

## 2024-01-04 PROBLEM — I15.2 HYPERTENSION ASSOCIATED WITH DIABETES (HCC): Status: RESOLVED | Noted: 2021-04-08 | Resolved: 2024-01-04

## 2024-01-04 PROBLEM — E78.5 DYSLIPIDEMIA ASSOCIATED WITH TYPE 2 DIABETES MELLITUS (HCC): Status: RESOLVED | Noted: 2018-03-08 | Resolved: 2024-01-04

## 2024-01-04 PROBLEM — E66.813 OBESITY, CLASS III, BMI 40-49.9 (MORBID OBESITY) (HCC): Status: ACTIVE | Noted: 2021-04-08

## 2024-01-04 PROBLEM — E66.01 OBESITY, CLASS III, BMI 40-49.9 (MORBID OBESITY) (HCC): Status: ACTIVE | Noted: 2021-04-08

## 2024-01-04 PROBLEM — I10 ESSENTIAL HYPERTENSION: Status: ACTIVE | Noted: 2021-04-08

## 2024-04-04 ENCOUNTER — HOSPITAL ENCOUNTER (OUTPATIENT)
Dept: MAMMOGRAPHY | Age: 64
Discharge: HOME | End: 2024-04-06
Attending: FAMILY MEDICINE
Payer: COMMERCIAL

## 2024-04-04 DIAGNOSIS — Z12.31 ENCOUNTER FOR SCREENING MAMMOGRAM FOR BREAST CANCER: ICD-10-CM

## 2024-04-04 PROCEDURE — 77063 BREAST TOMOSYNTHESIS BI: CPT

## 2024-05-08 ENCOUNTER — TELEPHONE (OUTPATIENT)
Dept: SLEEP CENTER | Age: 64
End: 2024-05-08

## 2024-09-19 ENCOUNTER — APPOINTMENT (OUTPATIENT)
Dept: GENERAL RADIOLOGY | Age: 64
End: 2024-09-19
Payer: COMMERCIAL

## 2024-09-19 ENCOUNTER — HOSPITAL ENCOUNTER (EMERGENCY)
Age: 64
Discharge: HOME | End: 2024-09-19
Payer: COMMERCIAL

## 2024-09-19 VITALS
OXYGEN SATURATION: 98 % | SYSTOLIC BLOOD PRESSURE: 176 MMHG | HEART RATE: 68 BPM | DIASTOLIC BLOOD PRESSURE: 75 MMHG | BODY MASS INDEX: 45.24 KG/M2 | TEMPERATURE: 97.9 F | WEIGHT: 265 LBS | HEIGHT: 64 IN | RESPIRATION RATE: 14 BRPM

## 2024-09-19 DIAGNOSIS — M17.11 PRIMARY OSTEOARTHRITIS OF RIGHT KNEE: ICD-10-CM

## 2024-09-19 DIAGNOSIS — S86.911A KNEE STRAIN, RIGHT, INITIAL ENCOUNTER: ICD-10-CM

## 2024-09-19 DIAGNOSIS — M25.561 PATELLOFEMORAL ARTHRALGIA OF RIGHT KNEE: Primary | ICD-10-CM

## 2024-09-19 DIAGNOSIS — M25.461 EFFUSION OF BURSA OF RIGHT KNEE: ICD-10-CM

## 2024-09-19 PROCEDURE — 99211 OFF/OP EST MAY X REQ PHY/QHP: CPT

## 2024-09-19 PROCEDURE — 73562 X-RAY EXAM OF KNEE 3: CPT

## 2024-09-19 ASSESSMENT — PAIN - FUNCTIONAL ASSESSMENT: PAIN_FUNCTIONAL_ASSESSMENT: 0-10

## 2024-09-19 ASSESSMENT — PAIN SCALES - GENERAL: PAINLEVEL_OUTOF10: 8

## 2024-09-19 ASSESSMENT — PAIN DESCRIPTION - LOCATION: LOCATION: KNEE

## 2024-09-19 ASSESSMENT — PAIN DESCRIPTION - DESCRIPTORS: DESCRIPTORS: ACHING;DISCOMFORT

## 2024-09-19 ASSESSMENT — PAIN DESCRIPTION - ORIENTATION: ORIENTATION: RIGHT

## 2024-09-25 ENCOUNTER — OFFICE VISIT (OUTPATIENT)
Age: 64
End: 2024-09-25
Payer: COMMERCIAL

## 2024-09-25 VITALS — TEMPERATURE: 98.6 F | WEIGHT: 265 LBS | BODY MASS INDEX: 45.24 KG/M2 | HEIGHT: 64 IN

## 2024-09-25 DIAGNOSIS — M25.532 LEFT WRIST PAIN: Primary | ICD-10-CM

## 2024-09-25 DIAGNOSIS — S63.502A WRIST SPRAIN, LEFT, INITIAL ENCOUNTER: Primary | ICD-10-CM

## 2024-09-25 PROCEDURE — 99203 OFFICE O/P NEW LOW 30 MIN: CPT | Performed by: FAMILY MEDICINE

## 2024-09-25 ASSESSMENT — ENCOUNTER SYMPTOMS
SHORTNESS OF BREATH: 0
CHEST TIGHTNESS: 0
ABDOMINAL PAIN: 0
NAUSEA: 0
COUGH: 0
CONSTIPATION: 0
DIARRHEA: 0
VOMITING: 0

## 2024-09-30 ENCOUNTER — OFFICE VISIT (OUTPATIENT)
Dept: FAMILY MEDICINE CLINIC | Age: 64
End: 2024-09-30
Payer: COMMERCIAL

## 2024-09-30 VITALS
RESPIRATION RATE: 19 BRPM | SYSTOLIC BLOOD PRESSURE: 130 MMHG | BODY MASS INDEX: 45.24 KG/M2 | OXYGEN SATURATION: 98 % | DIASTOLIC BLOOD PRESSURE: 84 MMHG | HEART RATE: 70 BPM | TEMPERATURE: 97.4 F | WEIGHT: 265 LBS | HEIGHT: 64 IN

## 2024-09-30 DIAGNOSIS — J01.00 ACUTE NON-RECURRENT MAXILLARY SINUSITIS: ICD-10-CM

## 2024-09-30 DIAGNOSIS — R68.89 FLU-LIKE SYMPTOMS: Primary | ICD-10-CM

## 2024-09-30 LAB
INFLUENZA A ANTIGEN, POC: NEGATIVE
INFLUENZA B ANTIGEN, POC: NEGATIVE
LOT EXPIRE DATE: NORMAL
LOT KIT NUMBER: NORMAL
S PYO AG THROAT QL: NORMAL
SARS-COV-2, POC: NORMAL
VALID INTERNAL CONTROL: NORMAL
VENDOR AND KIT NAME POC: NORMAL

## 2024-09-30 PROCEDURE — 99213 OFFICE O/P EST LOW 20 MIN: CPT

## 2024-09-30 PROCEDURE — 87880 STREP A ASSAY W/OPTIC: CPT

## 2024-09-30 PROCEDURE — 3075F SYST BP GE 130 - 139MM HG: CPT

## 2024-09-30 PROCEDURE — 3079F DIAST BP 80-89 MM HG: CPT

## 2024-09-30 PROCEDURE — 87428 SARSCOV & INF VIR A&B AG IA: CPT

## 2024-09-30 RX ORDER — GUAIFENESIN 600 MG/1
600 TABLET, EXTENDED RELEASE ORAL 2 TIMES DAILY
Qty: 28 TABLET | Refills: 0 | Status: SHIPPED | OUTPATIENT
Start: 2024-09-30

## 2024-09-30 RX ORDER — AZITHROMYCIN 250 MG/1
TABLET, FILM COATED ORAL
Qty: 6 TABLET | Refills: 0 | Status: SHIPPED | OUTPATIENT
Start: 2024-09-30

## 2024-09-30 RX ORDER — PREDNISONE 10 MG/1
10 TABLET ORAL 2 TIMES DAILY
Qty: 6 TABLET | Refills: 0 | Status: SHIPPED | OUTPATIENT
Start: 2024-09-30 | End: 2024-10-03

## 2024-09-30 NOTE — PROGRESS NOTES
24  Christina Moon : 1960 Sex: female  Age 63 y.o.    Subjective:  Chief Complaint   Patient presents with    Headache     Sinus drainage, sore throat, body aches, dizziness since last Thursday after flu shot       HPI:   Christina Moon , 63 y.o. female presents to the clinic for evaluation of sinus drainage x 4 days. The patient also reports headache, sore throat, body aches. The patient has taken Advil and Tylenol for symptoms. The patient reports worsening symptoms over time. The patient has ill exposure. The patient denies hx of COVID-19. The patient denies acute loss of taste and smel cough, rash, and fever. The patient also denies chest pain, abdominal pain, shortness of breath, wheezing, and nausea / vomiting / diarrhea.    ROS:   Unless otherwise stated in this report the patient's positive and negative responses for review of systems for constitutional, eyes, ENT, cardiovascular, respiratory, gastrointestinal, neurological, , musculoskeletal, and integument systems and related systems to the presenting problem are either stated in the history of present illness or were not pertinent or were negative for the symptoms and/or complaints related to the presenting medical problem.  Positives and pertinent negatives as per HPI.  All others reviewed and are negative.      PMH:     Past Medical History:   Diagnosis Date    Asthma     Bronchitis     Diabetes mellitus (HCC)     Hypertension     Obesity     ZEHRA (obstructive sleep apnea)     Psoriasis     Thyroid disease     Water retention        Past Surgical History:   Procedure Laterality Date    CHOLECYSTECTOMY      COLONOSCOPY      HYSTERECTOMY, TOTAL ABDOMINAL (CERVIX REMOVED)         Family History   Problem Relation Age of Onset    Breast Cancer Mother 89    Colon Cancer Mother 89    Stroke Father     Breast Cancer Sister 65    Hypertension Brother        Medications:     Current Outpatient Medications:     azithromycin (ZITHROMAX

## 2024-10-28 NOTE — PATIENT INSTRUCTIONS
[facility] Internal Medicine Hospitalist Discharge Summary     Patient ID:  Mingo White  81 year old  6/5/1943    Admit date: 10/19/2024    Discharge date and time: 10/28/2024    Attending Physician: Ming Ashley DO     Primary Care Physician: Daren Espino MD     Discharge Diagnoses:   Ventricular tachycardia with appropriate shocks  S/p DCICD  CAD s/p CABG 2006 (LIMA-LAD, SVG-RCA) s/p recent PCI to OM1 10/24/24   Ischemic cardiomyopathy   Chronic systolic HF  MAIA on CKD 3  DM II  HLD    Please note that only IHP DMG and EMG patients enrolled in the Medicare ACO, [facility] ACO and [facility] HMOs will be handled by the [facility] Care Management team.  For all other patients, please follow usual protocol for discharge care transition.    Discharge Condition: stable    Disposition:  Home with home care     Important Follow up:  - PCP: within 3 days   - Consults: Cards, pulm, EP    Follow Up Items:  Per cards    Hospital Course:      81 year old male with PMH sig for ischemic cardiomyopathy with an EF of 25%, history of VT status post AICD, chronic systolic heart failure, AAA status post EVAR, diabetes mellitus type 2, dyslipidemia, left renal artery stenosis presented after his ICD fired 4 times.      Ventricular tachycardia with appropriate shocks  S/p DCICD  - cont amio infusion, wean per cardiology  - coreg  - echo reviewed  - Evaluated by electrophysiologist, status post defibrillator generator replacement 10/25, unable to upgrade to CRT given anatomy per EP notes  - further management per cardiology      Left upper extremity swelling  - Acute on chronic  - In the setting of recent pacemaker surgery, plan for left upper extremity Dopplers  - IV diuresis per nephrology     Acute Hypercapnic Respiratory failure from acute COPD exacerbation-resolved  - pulmonology following  -Off BiPAP,  -IV steroids tapered off to p.o.  - budesonide/brovana      ICMP   Chronic  Recommendations for today's visit  · Continue Metformin 1000 mg twice a day and Glimepiride   · Change Basaglar to 35 units nightly   · Start  Ozempic  0.25 mg once a week , stay on this dose for two weeks then increase to 0.5 mg per week after that. May experience, nausea, should get better over the time. If nausea is severe at 0.5 mg and was better at 0.25 mg dose , decrease to 0.25 mg/wk and stay on that. If vomit one or twice you should not be concerned , if you vomit several times , you should stop the drug and call our office.  If you develop acute abdominal pain and vomiting, stop the medication completely and contact our office  · Check Blood sugar 2 times/day before meals and at bedtime and send us sugar log in a week or two    These are your blood sugar, blood pressure, cholesterol and A1c goals:  · Blood sugar fastin mg/dl to 130 mg/dl  · Blood sugar before meals: <150 mg/dl  · Peak blood sugar lower than 180 mg/dl  · A1c: between 6.5 - 7.5%    I you have any questions please call Dr. Papo Clayton office     Nelson Michel MD  Endocrinologist, [facility])   [facility] [address]   Phone: [phone]  Fax: [phone] systolic heart failure  - cardiology following  - further diuresis per cardiology/nephrology   - daily weights, I/Os  - coreg  - hold ACE-I given renal function   -2D echo with EF 15-20% with severe wall motion abnormality, see echo report for details,-this was previously noted on echo  - maximize GDMT as bp permits  - Given ectopy and multiple other risk factors including smoking, ischemic evaluation is warranted.   -Status post cardiac catheterization 10/24-status post PCI/NORRIS to OM     Acute kidney injury on CKD 3 with hyperkalemia  - s/p lokelma x3 doses per nephrology   - hold lisinopril   - I/Os, UOP  - Creatinine  - proBNP elevated  - Will defer diuresis to nephrology  - nephrology following, case discussed     DM2 with hyperglycemia  - ISS/accucheks  - Worsened given steroids, BG trend reviewed, within range  - s/p Tresiba 10 units daily  - dc home metformin given yessy function  - discharge home on novolog ISS, f/u with PCP     Hyperlipidemia  - statin     AAA s/p EVAR  - stable     Stable for discharge home with home care.     Consults: IP CONSULT TO HOSPITALIST  IP CONSULT TO NEPHROLOGY  IP CONSULT TO PULMONOLOGY  IP CONSULT TO CARDIOLOGY    Operative Procedures:  [facility]      Patient instructions:      I as the attending physician reconciled the current and discharge medications on day of discharge.        Discharge Medications        START taking these medications        Instructions Prescription details   acetaminophen 325 MG Tabs  Commonly known as: Tylenol      Take 2 tablets (650 mg total) by mouth every 6 (six) hours as needed for Pain or Fever.   Quantity: 30 tablet  Refills: 0     amiodarone 200 MG Tabs  Commonly known as: Pacerone      Take 1 tablet (200 mg total) by mouth 2 (two) times daily with meals.   Quantity: 90 tablet  Refills: 0     NovoLOG FlexPen 100 UNIT/ML Sopn  Generic drug: insulin aspart      Test blood glucose 3 times daily with meals Inject 1 unit if blood glucose is between 141-180  mg/dL Inject 2 units if blood glucose is between 181-220 mg/dL Inject 3 units if blood glucose is between 221-260 mg/dL Inject 4 units if blood glucose is between 261-300 mg/dL Inject 5 units if blood glucose is between 301-350 mg/dL Call your physician if blood glucose is greater than 351 mg/dL,   Quantity: 5 each  Refills: 3     OneTouch Verio Flex System w/Device Kit      Test blood sugar 3 times per day.   Quantity: 1 kit  Refills: 0     pantoprazole 40 MG Tbec  Commonly known as: Protonix  Replaces: Esomeprazole Magnesium 40 MG Cpdr      Take 1 tablet (40 mg total) by mouth every morning before breakfast.   Quantity: 90 tablet  Refills: 0     umeclidinium-vilanterol 62.5-25 MCG/ACT Aepb  Commonly known as: Anoro Ellipta      Inhale 1 puff into the lungs daily.   Quantity: 1 each  Refills: 0            CHANGE how you take these medications        Instructions Prescription details   atorvastatin 40 MG Tabs  Commonly known as: Lipitor  What changed:   medication strength  how much to take      Take 1 tablet (40 mg total) by mouth nightly.   Quantity: 30 tablet  Refills: 1     furosemide 40 MG Tabs  Commonly known as: Lasix  Start taking on: October 29, 2024  What changed:   medication strength  how much to take      Take 1 tablet (40 mg total) by mouth daily.   Quantity: 60 tablet  Refills: 1     Prodigy Lancets 28G Misc  What changed: Another medication with the same name was added. Make sure you understand how and when to take each.      Test once daily   Quantity: 100 each  Refills: 3     OneTouch Delica Lancets 33G Misc  What changed: You were already taking a medication with the same name, and this prescription was added. Make sure you understand how and when to take each.      Test blood sugar 3 times per day.   Quantity: 100 each  Refills: 6     Prodigy No Coding Blood Gluc Strp  What changed: Another medication with the same name was added. Make sure you understand how and when to take each.      Use as  directed to test blood sugar once daily   Quantity: 100 strip  Refills: 3     OneTouch Verio Strp  What changed: You were already taking a medication with the same name, and this prescription was added. Make sure you understand how and when to take each.      Test blood sugar 3 times per day.   Quantity: 100 strip  Refills: 6            CONTINUE taking these medications        Instructions Prescription details   albuterol 108 (90 Base) MCG/ACT Aers  Commonly known as: Ventolin HFA      Inhale 2 puffs into the lungs every 4 (four) hours as needed for Wheezing.   Quantity: 1 each  Refills: 0     aspirin 81 MG Tabs      Take 1 tablet (81 mg total) by mouth at bedtime.   Refills: 0     carvedilol 12.5 MG Tabs  Commonly known as: Coreg      Take 1 tablet (12.5 mg total) by mouth 2 (two) times daily.   Quantity: 180 tablet  Refills: 3     clopidogrel 75 MG Tabs  Commonly known as: Plavix      TAKE 1 TABLET DAILY   Quantity: 90 tablet  Refills: 3     Lancet Device Misc      MEASURE HOME GLUCOSE DAILY --DIRECTED   Quantity: 1 each  Refills: 11            STOP taking these medications      digoxin 0.125 MG Tabs  Commonly known as: Lanoxin        Esomeprazole Magnesium 40 MG Cpdr  Commonly known as: NEXIUM  Replaced by: pantoprazole 40 MG Tbec        lisinopril 5 MG Tabs  Commonly known as: Prinivil; Zestril        metFORMIN 500 MG Tabs  Commonly known as: Glucophage        predniSONE 20 MG Tabs  Commonly known as: Deltasone                  Where to Get Your Medications        [facility] [address] AT [facility], [phone], [phone]  [facility], [place] [address]      Phone: [phone]   amiodarone 200 MG Tabs  atorvastatin 40 MG Tabs  furosemide 40 MG Tabs  pantoprazole 40 MG Tbec       These medications were sent to [id number] - [address] [phone], [phone]   [address], [facility], [place] [address]      Phone: [phone]   NovoLOG FlexPen 100 UNIT/ML Sopn  OneTouch Delica Lancets 33G Misc  OneTouch Verio Flex System w/Device Kit  OneTouch Verio Strp  umeclidinium-vilanterol 62.5-25 MCG/ACT Aepb       Information about where to get these medications is not yet available    Ask your nurse or doctor about these medications  acetaminophen 325 MG Tabs               Activity: activity as tolerated  Diet: cardiac diet  Wound Care: as directed  Code Status: Prior      Exam on day of discharge:     Vitals:    10/28/24 0803   BP: 111/37   Pulse: 60   Resp: 18   Temp: 97.6 °F (36.4 °C)       Gen: No acute distress, alert and oriented x3, no focal neurologic deficits, pale and weak  Pulm: Scattered wheezing, intermittent rattles noted, productive cough, off O2  CV: Heart with regular rate and rhythm, no murmur.  Normal PMI.    Abd: Abdomen soft, nontender, nondistended, no organomegaly, bowel sounds present  MSK: Full range of motion in extremities, no clubbing, no cyanosis, gait not assessed. +LUE swelling.   Skin: no rashes or lesions      Total time coordinating care for discharge: Greater than 30 minutes    Ming Saldivar [facility] Hospitalist

## 2025-01-31 ENCOUNTER — OFFICE VISIT (OUTPATIENT)
Dept: FAMILY MEDICINE CLINIC | Age: 65
End: 2025-01-31
Payer: COMMERCIAL

## 2025-01-31 VITALS
OXYGEN SATURATION: 98 % | HEART RATE: 67 BPM | HEIGHT: 64 IN | RESPIRATION RATE: 19 BRPM | BODY MASS INDEX: 47.12 KG/M2 | TEMPERATURE: 97.4 F | WEIGHT: 276 LBS | DIASTOLIC BLOOD PRESSURE: 67 MMHG | SYSTOLIC BLOOD PRESSURE: 139 MMHG

## 2025-01-31 DIAGNOSIS — J06.9 URI WITH COUGH AND CONGESTION: Primary | ICD-10-CM

## 2025-01-31 DIAGNOSIS — J45.901 ASTHMA WITH ACUTE EXACERBATION, UNSPECIFIED ASTHMA SEVERITY, UNSPECIFIED WHETHER PERSISTENT: ICD-10-CM

## 2025-01-31 DIAGNOSIS — R68.89 FLU-LIKE SYMPTOMS: ICD-10-CM

## 2025-01-31 LAB
INFLUENZA A ANTIBODY: NEGATIVE
INFLUENZA B ANTIBODY: NEGATIVE

## 2025-01-31 PROCEDURE — 99214 OFFICE O/P EST MOD 30 MIN: CPT

## 2025-01-31 PROCEDURE — 3078F DIAST BP <80 MM HG: CPT

## 2025-01-31 PROCEDURE — 87804 INFLUENZA ASSAY W/OPTIC: CPT

## 2025-01-31 PROCEDURE — 3075F SYST BP GE 130 - 139MM HG: CPT

## 2025-01-31 RX ORDER — METHYLPREDNISOLONE 4 MG/1
TABLET ORAL
Qty: 1 KIT | Refills: 0 | Status: SHIPPED | OUTPATIENT
Start: 2025-01-31

## 2025-01-31 RX ORDER — AZITHROMYCIN 250 MG/1
TABLET, FILM COATED ORAL
Qty: 6 TABLET | Refills: 0 | Status: SHIPPED | OUTPATIENT
Start: 2025-01-31

## 2025-01-31 ASSESSMENT — PATIENT HEALTH QUESTIONNAIRE - PHQ9
3. TROUBLE FALLING OR STAYING ASLEEP: NOT AT ALL
4. FEELING TIRED OR HAVING LITTLE ENERGY: NOT AT ALL
SUM OF ALL RESPONSES TO PHQ9 QUESTIONS 1 & 2: 0
10. IF YOU CHECKED OFF ANY PROBLEMS, HOW DIFFICULT HAVE THESE PROBLEMS MADE IT FOR YOU TO DO YOUR WORK, TAKE CARE OF THINGS AT HOME, OR GET ALONG WITH OTHER PEOPLE: NOT DIFFICULT AT ALL
DEPRESSION UNABLE TO ASSESS: FUNCTIONAL CAPACITY MOTIVATION LIMITS ACCURACY
9. THOUGHTS THAT YOU WOULD BE BETTER OFF DEAD, OR OF HURTING YOURSELF: NOT AT ALL
2. FEELING DOWN, DEPRESSED OR HOPELESS: NOT AT ALL
6. FEELING BAD ABOUT YOURSELF - OR THAT YOU ARE A FAILURE OR HAVE LET YOURSELF OR YOUR FAMILY DOWN: NOT AT ALL
SUM OF ALL RESPONSES TO PHQ QUESTIONS 1-9: 0
SUM OF ALL RESPONSES TO PHQ QUESTIONS 1-9: 0
8. MOVING OR SPEAKING SO SLOWLY THAT OTHER PEOPLE COULD HAVE NOTICED. OR THE OPPOSITE, BEING SO FIGETY OR RESTLESS THAT YOU HAVE BEEN MOVING AROUND A LOT MORE THAN USUAL: NOT AT ALL
5. POOR APPETITE OR OVEREATING: NOT AT ALL
SUM OF ALL RESPONSES TO PHQ QUESTIONS 1-9: 0
1. LITTLE INTEREST OR PLEASURE IN DOING THINGS: NOT AT ALL
SUM OF ALL RESPONSES TO PHQ QUESTIONS 1-9: 0
7. TROUBLE CONCENTRATING ON THINGS, SUCH AS READING THE NEWSPAPER OR WATCHING TELEVISION: NOT AT ALL

## 2025-01-31 NOTE — PROGRESS NOTES
Chief Complaint       Headache (Body aches, chills, fever, cough  started Wednesday )    History of Present Illness   Source of history provided by:  patient.    History of Present Illness  The patient is a 64-year-old female presenting for evaluation of influenza-like symptoms.    She began experiencing symptoms on Wednesday, which escalated to a fever of 101 degrees this morning. She has been managing her symptoms with Tylenol since 5 AM. Over the past few days, she has also reported body aches and headaches. Mild congestion is present, which she attributes to her asthma, a condition that causes her to frequently cough up phlegm. Her asthma symptoms have been exacerbated by the recent cold weather. She has not previously been prescribed prednisone for her asthma.    Supplemental Information  She has a history of diabetes and psoriasis in her ears.    MEDICATIONS  Current: Tylenol    All other ROS negative unless otherwise stated in HPI.      ROS    Unless otherwise stated in this report or unable to obtain because of the patient's clinical or mental status as evidenced by the medical record, this patients's positive and negative responses for Review of Systems, constitutional, psych, eyes, ENT, cardiovascular, respiratory, gastrointestinal, neurological, genitourinary, musculoskeletal, integument systems and systems related to the presenting problem are either stated in the preceding or were not pertinent or were negative for the symptoms and/or complaints related to the medical problem.      Physical Exam         VS:  /67   Pulse 67   Temp 97.4 °F (36.3 °C) (Temporal)   Resp 19   Ht 1.626 m (5' 4.02\")   Wt 125.2 kg (276 lb)   SpO2 98%   BMI 47.35 kg/m²    Oxygen Saturation Interpretation: Normal.    Constitutional:  Alert, development consistent with age.  Ears:  External Ears: Bilateral pinna normal. TMs translucent without erythema or perforation bilaterally.  Canals normal bilaterally without

## 2025-03-17 ENCOUNTER — OFFICE VISIT (OUTPATIENT)
Dept: FAMILY MEDICINE CLINIC | Age: 65
End: 2025-03-17
Payer: COMMERCIAL

## 2025-03-17 VITALS
RESPIRATION RATE: 19 BRPM | OXYGEN SATURATION: 98 % | HEART RATE: 70 BPM | BODY MASS INDEX: 45.58 KG/M2 | HEIGHT: 64 IN | WEIGHT: 267 LBS | DIASTOLIC BLOOD PRESSURE: 78 MMHG | TEMPERATURE: 97.4 F | SYSTOLIC BLOOD PRESSURE: 151 MMHG

## 2025-03-17 DIAGNOSIS — R42 DIZZINESS: ICD-10-CM

## 2025-03-17 DIAGNOSIS — H69.93 DYSFUNCTION OF BOTH EUSTACHIAN TUBES: Primary | ICD-10-CM

## 2025-03-17 LAB
CHP ED QC CHECK: NORMAL
GLUCOSE BLD-MCNC: 187 MG/DL

## 2025-03-17 PROCEDURE — 3078F DIAST BP <80 MM HG: CPT

## 2025-03-17 PROCEDURE — 82962 GLUCOSE BLOOD TEST: CPT

## 2025-03-17 PROCEDURE — 3074F SYST BP LT 130 MM HG: CPT

## 2025-03-17 PROCEDURE — 99213 OFFICE O/P EST LOW 20 MIN: CPT

## 2025-03-17 RX ORDER — CETIRIZINE HYDROCHLORIDE 10 MG/1
10 TABLET ORAL NIGHTLY
Qty: 30 TABLET | Refills: 0 | Status: SHIPPED | OUTPATIENT
Start: 2025-03-17 | End: 2025-04-16

## 2025-03-17 ASSESSMENT — ENCOUNTER SYMPTOMS
VOMITING: 0
RHINORRHEA: 0
COUGH: 0
SINUS PRESSURE: 0
EYE PAIN: 0
NAUSEA: 0
WHEEZING: 0
BACK PAIN: 0
DIARRHEA: 0
EYE DISCHARGE: 0
EYE REDNESS: 0
SHORTNESS OF BREATH: 0
ABDOMINAL DISTENTION: 0
SORE THROAT: 0

## 2025-03-17 NOTE — PROGRESS NOTES
Chief Complaint:   Dizziness (Nausea , headache, had vertigo last week and still having symptoms)      History of Present Illness   HPI:  Christina Moon is a 64 y.o. female who [facility]iness that started last week. Pt states last week she developed vertigo which caused her to vomit four times and since then she has had intermittent dizziness. Pt describes dizziness as feeling lightheaded with sense of room spinning that causes her to feel off balance. Pt states dizziness is exacerbated with movement, specifically if she turns rapidly. Pt denies alleviating factors. Pt states each episode last only a few seconds. Pt denies head injury, LOC. Pt states she has had vertigo in the past. Pt states she is a diabetic and her blood sugars have been steadily around 210 without significant change.     Prior to Visit Medications    Medication Sig Taking? Authorizing Provider   venlafaxine (EFFEXOR XR) 150 MG extended release capsule Take 1 capsule by mouth daily Yes Joe Becker DO   vitamin D3 (CHOLECALCIFEROL) 125 MCG (5000 UT) TABS tablet Take 1 tablet by mouth once a week Yes Joe Becker DO   insulin detemir (LEVEMIR FLEXTOUCH) 100 UNIT/ML injection pen Inject 20 Units into the skin daily Yes Joe Becker DO   Insulin Pen Needle ([employer] PEN NEEDLES 31G) 31G X 8 MM MISC 1 each by Does not apply route daily Yes Joe Becker DO   levothyroxine (SYNTHROID) 200 MCG tablet Take 1 tablet by mouth Daily Yes Joe Becker DO   lisinopril (PRINIVIL;ZESTRIL) 20 MG tablet TAKE 1 TABLET BY MOUTH DAILY Yes Joe Becker DO   pantoprazole (PROTONIX) 20 MG tablet Take 1 tablet by mouth every morning (before breakfast) Yes Joe Becker DO   rosuvastatin (CRESTOR) 40 MG tablet Take 1 tablet by mouth daily Yes Joe Becker DO   metFORMIN (GLUCOPHAGE) 1000 MG tablet TAKE 1 TABLET BY MOUTH TWICE DAILY WITH MEALS Yes Joe Becker DO   guaiFENesin (MUCINEX) 600 MG extended

## 2025-04-25 ENCOUNTER — RESULTS FOLLOW-UP (OUTPATIENT)
Dept: FAMILY MEDICINE CLINIC | Age: 65
End: 2025-04-25

## 2025-04-25 ENCOUNTER — OFFICE VISIT (OUTPATIENT)
Dept: FAMILY MEDICINE CLINIC | Age: 65
End: 2025-04-25
Payer: COMMERCIAL

## 2025-04-25 VITALS
SYSTOLIC BLOOD PRESSURE: 138 MMHG | TEMPERATURE: 97.4 F | HEART RATE: 72 BPM | BODY MASS INDEX: 43.87 KG/M2 | WEIGHT: 257 LBS | RESPIRATION RATE: 19 BRPM | HEIGHT: 64 IN | OXYGEN SATURATION: 98 % | DIASTOLIC BLOOD PRESSURE: 80 MMHG

## 2025-04-25 DIAGNOSIS — J06.9 URI WITH COUGH AND CONGESTION: Primary | ICD-10-CM

## 2025-04-25 DIAGNOSIS — R05.9 COUGH, UNSPECIFIED TYPE: ICD-10-CM

## 2025-04-25 DIAGNOSIS — J45.21 MILD INTERMITTENT ASTHMA WITH EXACERBATION: ICD-10-CM

## 2025-04-25 PROCEDURE — 3079F DIAST BP 80-89 MM HG: CPT

## 2025-04-25 PROCEDURE — 99213 OFFICE O/P EST LOW 20 MIN: CPT

## 2025-04-25 PROCEDURE — 3075F SYST BP GE 130 - 139MM HG: CPT

## 2025-04-25 RX ORDER — IPRATROPIUM BROMIDE AND ALBUTEROL SULFATE 2.5; .5 MG/3ML; MG/3ML
1 SOLUTION RESPIRATORY (INHALATION) ONCE
Status: COMPLETED | OUTPATIENT
Start: 2025-04-25 | End: 2025-04-25

## 2025-04-25 RX ORDER — PREDNISONE 10 MG/1
10 TABLET ORAL 2 TIMES DAILY
Qty: 10 TABLET | Refills: 0 | Status: SHIPPED | OUTPATIENT
Start: 2025-04-25 | End: 2025-04-30

## 2025-04-25 RX ORDER — AZITHROMYCIN 250 MG/1
TABLET, FILM COATED ORAL
Qty: 6 TABLET | Refills: 0 | Status: SHIPPED | OUTPATIENT
Start: 2025-04-25 | End: 2025-05-05

## 2025-04-25 RX ADMIN — IPRATROPIUM BROMIDE AND ALBUTEROL SULFATE 1 DOSE: 2.5; .5 SOLUTION RESPIRATORY (INHALATION) at 11:11

## 2025-04-25 NOTE — PROGRESS NOTES
Chief Complaint       Shortness of Breath (Feels like she can't take deep breath, wheezing for about a week and half)      History of Present Illness   Source of history provided by:  patient.     Christina Moon is a 64 y.o. old female presenting to the walk in clinic for evaluation of shortness of breath with exertion, sinus congestion, cough with yellow sputum production and wheezing x 10 days. Has been taking nothing OTC for relief. Denies any fever, chills, wheezing, CP, or GI symptoms. Hx of asthma. Denies COPD. Former tobacco use.  Pt has known sick exposure (influenza). Pt has been using prescribed inhalers without much relief.    ROS    Unless otherwise stated in this report or unable to obtain because of the patient's clinical or mental status as evidenced by the medical record, this patients's positive and negative responses for Review of Systems, constitutional, psych, eyes, ENT, cardiovascular, respiratory, gastrointestinal, neurological, genitourinary, musculoskeletal, integument systems and systems related to the presenting problem are either stated in the preceding or were not pertinent or were negative for the symptoms and/or complaints related to the medical problem.      Physical Exam         VS:  /80   Pulse 72   Temp 97.4 °F (36.3 °C)   Resp 19   Ht 1.626 m (5' 4.02\")   Wt 116.6 kg (257 lb)   SpO2 98%   BMI 44.09 kg/m²    Oxygen Saturation Interpretation: Normal.    Constitutional:  Alert, development consistent with age.  Ears:  External Ears: Bilateral pinna normal. TMs  without erythema or perforation bilaterally.  Canals normal bilaterally without swelling or exudate  Nose:  Has congestion of the nasal mucosa. There is no injection to middle turbinates bilaterally.   Throat: No posterior pharyngeal erythema with mild post nasal drip present.  No exudate or tonsillar hypertrophy noted.    Neck:  Supple. There is no anterior cervical adenopathy.  Lungs: Expiratory wheeze in all

## 2025-08-27 ENCOUNTER — HOSPITAL ENCOUNTER (EMERGENCY)
Age: 65
Discharge: HOME | End: 2025-08-28
Attending: EMERGENCY MEDICINE
Payer: COMMERCIAL

## 2025-08-27 ENCOUNTER — APPOINTMENT (OUTPATIENT)
Dept: CT IMAGING | Age: 65
End: 2025-08-27
Payer: COMMERCIAL

## 2025-08-27 ENCOUNTER — OFFICE VISIT (OUTPATIENT)
Dept: FAMILY MEDICINE CLINIC | Age: 65
End: 2025-08-27
Payer: COMMERCIAL

## 2025-08-27 VITALS
DIASTOLIC BLOOD PRESSURE: 84 MMHG | SYSTOLIC BLOOD PRESSURE: 136 MMHG | RESPIRATION RATE: 18 BRPM | HEART RATE: 69 BPM | TEMPERATURE: 97.8 F | OXYGEN SATURATION: 98 %

## 2025-08-27 DIAGNOSIS — I10 UNCONTROLLED HYPERTENSION: ICD-10-CM

## 2025-08-27 DIAGNOSIS — R42 DIZZINESS: ICD-10-CM

## 2025-08-27 DIAGNOSIS — R11.2 NAUSEA AND VOMITING, UNSPECIFIED VOMITING TYPE: ICD-10-CM

## 2025-08-27 DIAGNOSIS — R42 VERTIGO: ICD-10-CM

## 2025-08-27 DIAGNOSIS — R51.9 ACUTE INTRACTABLE HEADACHE, UNSPECIFIED HEADACHE TYPE: Primary | ICD-10-CM

## 2025-08-27 DIAGNOSIS — R51.9 ACUTE NONINTRACTABLE HEADACHE, UNSPECIFIED HEADACHE TYPE: Primary | ICD-10-CM

## 2025-08-27 LAB
ANION GAP SERPL CALCULATED.3IONS-SCNC: 11 MMOL/L (ref 7–16)
BUN SERPL-MCNC: 18 MG/DL (ref 8–23)
CALCIUM SERPL-MCNC: 9.2 MG/DL (ref 8.8–10.2)
CHLORIDE SERPL-SCNC: 99 MMOL/L (ref 98–107)
CO2 SERPL-SCNC: 27 MMOL/L (ref 22–29)
CREAT SERPL-MCNC: 0.7 MG/DL (ref 0.5–1)
EKG ATRIAL RATE: 58 BPM
EKG P AXIS: 46 DEGREES
EKG P-R INTERVAL: 136 MS
EKG Q-T INTERVAL: 464 MS
EKG QRS DURATION: 120 MS
EKG QTC CALCULATION (BAZETT): 455 MS
EKG R AXIS: -12 DEGREES
EKG T AXIS: 21 DEGREES
EKG VENTRICULAR RATE: 58 BPM
ERYTHROCYTE [DISTWIDTH] IN BLOOD BY AUTOMATED COUNT: 12 % (ref 11.5–15)
GFR, ESTIMATED: >90 ML/MIN/1.73M2
GLUCOSE SERPL-MCNC: 196 MG/DL (ref 74–99)
GLUCOSE, POC: 242 MG/DL
HCT VFR BLD AUTO: 38.7 % (ref 34–48)
HGB BLD-MCNC: 12.7 G/DL (ref 11.5–15.5)
MCH RBC QN AUTO: 28.6 PG (ref 26–35)
MCHC RBC AUTO-ENTMCNC: 32.8 G/DL (ref 32–34.5)
MCV RBC AUTO: 87.2 FL (ref 80–99.9)
PLATELET # BLD AUTO: 253 K/UL (ref 130–450)
PMV BLD AUTO: 8.9 FL (ref 7–12)
POTASSIUM SERPL-SCNC: 4.3 MMOL/L (ref 3.5–5.1)
RBC # BLD AUTO: 4.44 M/UL (ref 3.5–5.5)
SODIUM SERPL-SCNC: 136 MMOL/L (ref 136–145)
TROPONIN I SERPL HS-MCNC: 15 NG/L (ref 0–14)
TROPONIN I SERPL HS-MCNC: 17 NG/L (ref 0–14)
WBC OTHER # BLD: 8 K/UL (ref 4.5–11.5)

## 2025-08-27 PROCEDURE — 6360000002 HC RX W HCPCS: Performed by: EMERGENCY MEDICINE

## 2025-08-27 PROCEDURE — 96374 THER/PROPH/DIAG INJ IV PUSH: CPT

## 2025-08-27 PROCEDURE — 3074F SYST BP LT 130 MM HG: CPT

## 2025-08-27 PROCEDURE — 70450 CT HEAD/BRAIN W/O DYE: CPT

## 2025-08-27 PROCEDURE — 84484 ASSAY OF TROPONIN QUANT: CPT

## 2025-08-27 PROCEDURE — 85027 COMPLETE CBC AUTOMATED: CPT

## 2025-08-27 PROCEDURE — 96376 TX/PRO/DX INJ SAME DRUG ADON: CPT

## 2025-08-27 PROCEDURE — 3079F DIAST BP 80-89 MM HG: CPT

## 2025-08-27 PROCEDURE — 99284 EMERGENCY DEPT VISIT MOD MDM: CPT

## 2025-08-27 PROCEDURE — 82962 GLUCOSE BLOOD TEST: CPT

## 2025-08-27 PROCEDURE — 6370000000 HC RX 637 (ALT 250 FOR IP): Performed by: EMERGENCY MEDICINE

## 2025-08-27 PROCEDURE — 80048 BASIC METABOLIC PNL TOTAL CA: CPT

## 2025-08-27 PROCEDURE — 96375 TX/PRO/DX INJ NEW DRUG ADDON: CPT

## 2025-08-27 PROCEDURE — 99213 OFFICE O/P EST LOW 20 MIN: CPT

## 2025-08-27 RX ORDER — ONDANSETRON 4 MG/1
4 TABLET, ORALLY DISINTEGRATING ORAL ONCE
Status: COMPLETED | OUTPATIENT
Start: 2025-08-27 | End: 2025-08-27

## 2025-08-27 RX ORDER — KETOROLAC TROMETHAMINE 30 MG/ML
30 INJECTION, SOLUTION INTRAMUSCULAR; INTRAVENOUS ONCE
Status: DISCONTINUED | OUTPATIENT
Start: 2025-08-27 | End: 2025-08-27

## 2025-08-27 RX ORDER — MECLIZINE HCL 12.5 MG 12.5 MG/1
25 TABLET ORAL ONCE
Status: COMPLETED | OUTPATIENT
Start: 2025-08-27 | End: 2025-08-27

## 2025-08-27 RX ORDER — ONDANSETRON 4 MG/1
4 TABLET, ORALLY DISINTEGRATING ORAL ONCE
Status: DISCONTINUED | OUTPATIENT
Start: 2025-08-27 | End: 2025-08-27

## 2025-08-27 RX ORDER — HYDRALAZINE HYDROCHLORIDE 20 MG/ML
10 INJECTION INTRAMUSCULAR; INTRAVENOUS ONCE
Status: COMPLETED | OUTPATIENT
Start: 2025-08-27 | End: 2025-08-27

## 2025-08-27 RX ORDER — METOCLOPRAMIDE HYDROCHLORIDE 5 MG/ML
10 INJECTION INTRAMUSCULAR; INTRAVENOUS ONCE
Status: COMPLETED | OUTPATIENT
Start: 2025-08-27 | End: 2025-08-27

## 2025-08-27 RX ORDER — DIPHENHYDRAMINE HYDROCHLORIDE 50 MG/ML
25 INJECTION, SOLUTION INTRAMUSCULAR; INTRAVENOUS ONCE
Status: COMPLETED | OUTPATIENT
Start: 2025-08-27 | End: 2025-08-27

## 2025-08-27 RX ORDER — KETOROLAC TROMETHAMINE 30 MG/ML
15 INJECTION, SOLUTION INTRAMUSCULAR; INTRAVENOUS ONCE
Status: COMPLETED | OUTPATIENT
Start: 2025-08-27 | End: 2025-08-27

## 2025-08-27 RX ADMIN — MECLIZINE HCL 12.5 MG 25 MG: 12.5 TABLET ORAL at 16:10

## 2025-08-27 RX ADMIN — METOCLOPRAMIDE 10 MG: 5 INJECTION, SOLUTION INTRAMUSCULAR; INTRAVENOUS at 21:33

## 2025-08-27 RX ADMIN — HYDRALAZINE HYDROCHLORIDE 10 MG: 20 INJECTION INTRAMUSCULAR; INTRAVENOUS at 21:28

## 2025-08-27 RX ADMIN — DIPHENHYDRAMINE HYDROCHLORIDE 25 MG: 50 INJECTION INTRAMUSCULAR; INTRAVENOUS at 21:32

## 2025-08-27 RX ADMIN — ONDANSETRON 4 MG: 4 TABLET, ORALLY DISINTEGRATING ORAL at 13:05

## 2025-08-27 RX ADMIN — KETOROLAC TROMETHAMINE 15 MG: 30 INJECTION, SOLUTION INTRAMUSCULAR at 23:58

## 2025-08-27 RX ADMIN — HYDRALAZINE HYDROCHLORIDE 10 MG: 20 INJECTION INTRAMUSCULAR; INTRAVENOUS at 17:44

## 2025-08-27 ASSESSMENT — ENCOUNTER SYMPTOMS
SHORTNESS OF BREATH: 0
WHEEZING: 0
SORE THROAT: 0
EYE DISCHARGE: 0
VOMITING: 1
BACK PAIN: 0
EYE PAIN: 0
DIARRHEA: 0
ABDOMINAL DISTENTION: 0
EYE REDNESS: 0
NAUSEA: 1
COUGH: 0
SINUS PRESSURE: 0

## 2025-08-27 ASSESSMENT — PAIN DESCRIPTION - LOCATION: LOCATION: HEAD

## 2025-08-27 ASSESSMENT — PAIN SCALES - GENERAL: PAINLEVEL_OUTOF10: 8

## 2025-08-28 VITALS
HEART RATE: 64 BPM | RESPIRATION RATE: 16 BRPM | DIASTOLIC BLOOD PRESSURE: 52 MMHG | OXYGEN SATURATION: 97 % | TEMPERATURE: 98.5 F | SYSTOLIC BLOOD PRESSURE: 165 MMHG

## 2025-08-28 RX ORDER — MECLIZINE HYDROCHLORIDE 25 MG/1
25 TABLET ORAL 3 TIMES DAILY PRN
Qty: 15 TABLET | Refills: 0 | Status: SHIPPED | OUTPATIENT
Start: 2025-08-28 | End: 2025-09-07